# Patient Record
Sex: MALE | Race: WHITE | NOT HISPANIC OR LATINO | Employment: OTHER | URBAN - METROPOLITAN AREA
[De-identification: names, ages, dates, MRNs, and addresses within clinical notes are randomized per-mention and may not be internally consistent; named-entity substitution may affect disease eponyms.]

---

## 2017-01-23 ENCOUNTER — OFFICE VISIT (OUTPATIENT)
Dept: URGENT CARE | Facility: URGENT CARE | Age: 62
End: 2017-01-23
Payer: COMMERCIAL

## 2017-01-23 VITALS
HEIGHT: 68 IN | BODY MASS INDEX: 29.4 KG/M2 | WEIGHT: 194 LBS | TEMPERATURE: 99 F | SYSTOLIC BLOOD PRESSURE: 160 MMHG | OXYGEN SATURATION: 92 % | RESPIRATION RATE: 14 BRPM | DIASTOLIC BLOOD PRESSURE: 69 MMHG | HEART RATE: 77 BPM

## 2017-01-23 DIAGNOSIS — J20.9 ACUTE BRONCHITIS WITH SYMPTOMS > 10 DAYS: Primary | ICD-10-CM

## 2017-01-23 PROCEDURE — 99213 OFFICE O/P EST LOW 20 MIN: CPT | Performed by: NURSE PRACTITIONER

## 2017-01-23 RX ORDER — AZITHROMYCIN 250 MG/1
TABLET, FILM COATED ORAL
Qty: 6 TABLET | Refills: 0 | Status: SHIPPED | OUTPATIENT
Start: 2017-01-23 | End: 2018-09-17

## 2017-01-23 RX ORDER — PREDNISONE 20 MG/1
20 TABLET ORAL 2 TIMES DAILY
Qty: 10 TABLET | Refills: 0 | Status: SHIPPED | OUTPATIENT
Start: 2017-01-23 | End: 2017-01-28

## 2017-01-23 RX ORDER — CODEINE PHOSPHATE AND GUAIFENESIN 10; 100 MG/5ML; MG/5ML
1 SOLUTION ORAL EVERY 4 HOURS PRN
Qty: 236 ML | Refills: 0 | Status: SHIPPED | OUTPATIENT
Start: 2017-01-23 | End: 2018-09-17

## 2017-01-23 NOTE — MR AVS SNAPSHOT
After Visit Summary   1/23/2017    Quique Lyons    MRN: [id number]           Patient Information     Date Of Birth          1955        Visit Information        Provider Department      1/23/2017 6:50 PM Natty Escalante APRN [facility]        Today's Diagnoses     Acute bronchitis with symptoms > 10 days    -  1       Care Instructions      Bronchitis, Antibiotic Treatment (Adult)    Bronchitis is an infection of the air passages (bronchial tubes) in your lungs. It often occurs when you have a cold. This illness is contagious during the first few days and is spread through the air by coughing and sneezing, or by direct contact (touching the sick person and then touching your own eyes, nose, or mouth).  Symptoms of bronchitis include cough with mucus (phlegm) and low-grade fever. Bronchitis usually lasts 7 to 14 days. Mild cases can be treated with simple home remedies. More severe infection is treated with an antibiotic.  Home care  Follow these guidelines when caring for yourself at home:    If your symptoms are severe, rest at home for the first 2 to 3 days. When you go back to your usual activities, don't let yourself get too tired.    Do not smoke. Also avoid being exposed to secondhand smoke.    You may use over-the-counter medicines to control fever or pain, unless another medicine was prescribed. (Note: If you have chronic liver or kidney disease or have ever had a stomach ulcer or gastrointestinal bleeding, talk with your healthcare provider before using these medicines. Also talk to your provider if you are taking medicine to prevent blood clots.) Aspirin should never be given to anyone younger than 18 years of age who is ill with a viral infection or fever. It may cause severe liver or brain damage.    Your appetite may be poor, so a light diet is fine. Avoid dehydration by drinking 6 to 8 glasses of fluids per day (such as water, soft drinks, sports drinks, juices,  tea, or soup). Extra fluids will help loosen secretions in the nose and lungs.    Over-the-counter cough, cold, and sore-throat medicines will not shorten the length of the illness, but they may be helpful to reduce symptoms. (Note: Do not use decongestants if you have high blood pressure.)    Finish all antibiotic medicine. Do this even if you are feeling better after only a few days.  Follow-up care  Follow up with your healthcare provider, or as advised. If you had an X-ray or ECG (electrocardiogram), a specialist will review it. You will be notified of any new findings that may affect your care.  Note: If you are age 65 or older, or if you have a chronic lung disease or condition that affects your immune system, or you smoke, talk to your healthcare provider about having pneumococcal vaccinations and a yearly influenza vaccination (flu shot).  When to seek medical advice  Call your healthcare provider right away if any of these occur:    Fever of 100.4 F (38 C) or higher    Coughing up increased amounts of colored sputum    Weakness, drowsiness, headache, facial pain, ear pain, or a stiff neck   Call 911, or get immediate medical care  Contact emergency services right away if any of these occur.    Coughing up blood    Worsening weakness, drowsiness, headache, or stiff neck    Trouble breathing, wheezing, or pain with breathing    [id number] [employer] [address] [place] [address], [place] [id number]. All rights reserved. This information is not intended as a substitute for professional medical care. Always follow your healthcare professional's instructions.              Follow-ups after your visit        Who to contact     If you have questions or need follow up information about today's clinic visit or your schedule please contact [facility] directly at [phone].  Normal or non-critical lab and imaging results will be communicated to you by MyChart, letter or phone within 4 business  "days after the clinic has received the results. If you do not hear from us within 7 days, please contact the clinic through [facility] or phone. If you have a critical or abnormal lab result, we will notify you by phone as soon as possible.  Submit refill requests through [facility] or call your pharmacy and they will forward the refill request to us. Please allow 3 business days for your refill to be completed.          Additional Information About Your Visit        [facility] Information     [facility] lets you send messages to your doctor, view your test results, renew your prescriptions, schedule appointments and more. To sign up, go to [email or web address] . Click on \"Log in\" on the left side of the screen, which will take you to the Welcome page. Then click on \"Sign up Now\" on the right side of the page.     You will be asked to enter the access code listed below, as well as some personal information. Please follow the directions to create your username and password.     Your access code is: [email or web address]  Expires: 2017  7:48 PM     Your access code will  in 90 days. If you need help or a new code, please call your [facility] clinic or [phone].        Care EveryWhere ID     This is your Care EveryWhere ID. This could be used by other organizations to access your [facility] medical records  [id number]        Your Vitals Were     Pulse Temperature Respirations Height BMI (Body Mass Index) Pulse Oximetry    77 99  F (37.2  C) 14 5' 8\" (1.727 m) 29.50 kg/m2 92%       Blood Pressure from Last 3 Encounters:   17 160/69   06/11/15 137/68   04/06/15 138/75    Weight from Last 3 Encounters:   17 194 lb (87.998 kg)   06/11/15 191 lb (86.637 kg)   04/06/15 201 lb (91.173 kg)              Today, you had the following     No orders found for display         Today's Medication Changes          These changes are accurate as of: 17  7:48 PM.  If you have any questions, ask your nurse or doctor.          "      Start taking these medicines.        Dose/Directions    azithromycin 250 MG tablet   Commonly known as:  ZITHROMAX   Used for:  Acute bronchitis with symptoms > 10 days        Two tablets first day, then one tablet daily for four days.   Quantity:  6 tablet   Refills:  0       guaiFENesin-codeine 100-10 MG/5ML Soln solution   Commonly known as:  ROBITUSSIN AC   Used for:  Acute bronchitis with symptoms > 10 days        Dose:  1 tsp.   Take 5 mLs by mouth every 4 hours as needed for cough   Quantity:  236 mL   Refills:  0       predniSONE 20 MG tablet   Commonly known as:  DELTASONE   Used for:  Acute bronchitis with symptoms > 10 days        Dose:  20 mg   Take 1 tablet (20 mg) by mouth 2 times daily for 5 days   Quantity:  10 tablet   Refills:  0            Where to get your medicines      These medications were sent to [id number] - [facility] [address] [facility]  [address] [facility] [address]     Phone:  [phone]    - azithromycin 250 MG tablet  - predniSONE 20 MG tablet      Some of these will need a paper prescription and others can be bought over the counter.  Ask your nurse if you have questions.     Bring a paper prescription for each of these medications    - guaiFENesin-codeine 100-10 MG/5ML Soln solution             Primary Care Provider Office Phone # Fax #    [facility] [phone] [address] [facility]. [facility] [address]        Thank you!     Thank you for choosing [facility]  for your care. Our goal is always to provide you with excellent care. Hearing back from our patients is one way we can continue to improve our services. Please take a few minutes to complete the written survey that you may receive in the mail after your visit with us. Thank you!             Your Updated Medication List - Protect others around you: Learn how to safely use, store and throw away your  medicines at www.disposemymeds.org.          This list is accurate as of: 1/23/17  7:48 PM.  Always use your most recent med list.                   Brand Name Dispense Instructions for use    albuterol 108 (90 BASE) MCG/ACT Inhaler    PROAIR HFA/PROVENTIL HFA/VENTOLIN HFA    1 Inhaler    Inhale 2 puffs into the lungs every 6 hours as needed for shortness of breath / dyspnea       azithromycin 250 MG tablet    ZITHROMAX    6 tablet    Two tablets first day, then one tablet daily for four days.       guaiFENesin-codeine 100-10 MG/5ML Soln solution    ROBITUSSIN AC    236 mL    Take 5 mLs by mouth every 4 hours as needed for cough       predniSONE 20 MG tablet    DELTASONE    10 tablet    Take 1 tablet (20 mg) by mouth 2 times daily for 5 days

## 2017-01-24 NOTE — PATIENT INSTRUCTIONS
Bronchitis, Antibiotic Treatment (Adult)    Bronchitis is an infection of the air passages (bronchial tubes) in your lungs. It often occurs when you have a cold. This illness is contagious during the first few days and is spread through the air by coughing and sneezing, or by direct contact (touching the sick person and then touching your own eyes, nose, or mouth).  Symptoms of bronchitis include cough with mucus (phlegm) and low-grade fever. Bronchitis usually lasts 7 to 14 days. Mild cases can be treated with simple home remedies. More severe infection is treated with an antibiotic.  Home care  Follow these guidelines when caring for yourself at home:    If your symptoms are severe, rest at home for the first 2 to 3 days. When you go back to your usual activities, don't let yourself get too tired.    Do not smoke. Also avoid being exposed to secondhand smoke.    You may use over-the-counter medicines to control fever or pain, unless another medicine was prescribed. (Note: If you have chronic liver or kidney disease or have ever had a stomach ulcer or gastrointestinal bleeding, talk with your healthcare provider before using these medicines. Also talk to your provider if you are taking medicine to prevent blood clots.) Aspirin should never be given to anyone younger than 18 years of age who is ill with a viral infection or fever. It may cause severe liver or brain damage.    Your appetite may be poor, so a light diet is fine. Avoid dehydration by drinking 6 to 8 glasses of fluids per day (such as water, soft drinks, sports drinks, juices, tea, or soup). Extra fluids will help loosen secretions in the nose and lungs.    Over-the-counter cough, cold, and sore-throat medicines will not shorten the length of the illness, but they may be helpful to reduce symptoms. (Note: Do not use decongestants if you have high blood pressure.)    Finish all antibiotic medicine. Do this even if you are feeling better after only a  few days.  Follow-up care  Follow up with your healthcare provider, or as advised. If you had an X-ray or ECG (electrocardiogram), a specialist will review it. You will be notified of any new findings that may affect your care.  Note: If you are age 65 or older, or if you have a chronic lung disease or condition that affects your immune system, or you smoke, talk to your healthcare provider about having pneumococcal vaccinations and a yearly influenza vaccination (flu shot).  When to seek medical advice  Call your healthcare provider right away if any of these occur:    Fever of 100.4 F (38 C) or higher    Coughing up increased amounts of colored sputum    Weakness, drowsiness, headache, facial pain, ear pain, or a stiff neck   Call 911, or get immediate medical care  Contact emergency services right away if any of these occur.    Coughing up blood    Worsening weakness, drowsiness, headache, or stiff neck    Trouble breathing, wheezing, or pain with breathing    [id number] [employer]. [address] [place] [address], [place] [address]. All rights reserved. This information is not intended as a substitute for professional medical care. Always follow your healthcare professional's instructions.

## 2017-01-24 NOTE — PROGRESS NOTES
"  SUBJECTIVE:                                                    Quique Lyons is a 61 year old male who presents to clinic today for the following health issues:      Acute Illness   Acute illness concerns: cough  Onset: 2 weeks     Fever: no     Chills/Sweats: YES    Headache (location?): YES    Sinus Pressure:no    Conjunctivitis:  YES-     Ear Pain: no    Rhinorrhea: YES    Congestion: YES    Sore Throat: YES     Cough: YES    Wheeze: YES    Decreased Appetite: no     Nausea: no     Vomiting: no     Diarrhea:  no     Dysuria/Freq.: no     Fatigue/Achiness: YES    Sick/Strep Exposure: YES     Therapies Tried and outcome: albuterol, not helping      Problem list and histories reviewed & adjusted, as indicated.  Additional history: as documented    Problem list, Medication list, Allergies, and Medical/Social/Surgical histories reviewed in EPIC and updated as appropriate.    ROS:  Constitutional, HEENT, cardiovascular, pulmonary, gi and gu systems are negative, except as otherwise noted.    OBJECTIVE:                                                    /69 mmHg  Pulse 77  Temp(Src) 99  F (37.2  C)  Resp 14  Ht 5' 8\" (1.727 m)  Wt 194 lb (87.998 kg)  BMI 29.50 kg/m2  SpO2 92%  Body mass index is 29.5 kg/(m^2).  GENERAL: alert and no distress  EYES: Eyes grossly normal to inspection, PERRL and conjunctivae and sclerae normal  HENT: ear canals and TM's normal, nose and mouth without ulcers or lesions  NECK: no adenopathy, no asymmetry, masses, or scars and thyroid normal to palpation  RESP: expiratory wheezes throughout  CV: regular rate and rhythm, normal S1 S2, no S3 or S4, no murmur, click or rub, no peripheral edema and peripheral pulses strong      Diagnostic Test Results:  none      ASSESSMENT/PLAN:                                                      1. Acute bronchitis with symptoms > 10 days    - predniSONE (DELTASONE) 20 MG tablet; Take 1 tablet (20 mg) by mouth 2 times daily for 5 days  Dispense: " 10 tablet; Refill: 0  - guaiFENesin-codeine (ROBITUSSIN AC) 100-10 MG/5ML SOLN solution; Take 5 mLs by mouth every 4 hours as needed for cough  Dispense: 236 mL; Refill: 0  - azithromycin (ZITHROMAX) 250 MG tablet; Two tablets first day, then one tablet daily for four days.  Dispense: 6 tablet; Refill: 0    See Patient Instructions  Patient Instructions     Bronchitis, Antibiotic Treatment (Adult)    Bronchitis is an infection of the air passages (bronchial tubes) in your lungs. It often occurs when you have a cold. This illness is contagious during the first few days and is spread through the air by coughing and sneezing, or by direct contact (touching the sick person and then touching your own eyes, nose, or mouth).  Symptoms of bronchitis include cough with mucus (phlegm) and low-grade fever. Bronchitis usually lasts 7 to 14 days. Mild cases can be treated with simple home remedies. More severe infection is treated with an antibiotic.  Home care  Follow these guidelines when caring for yourself at home:    If your symptoms are severe, rest at home for the first 2 to 3 days. When you go back to your usual activities, don't let yourself get too tired.    Do not smoke. Also avoid being exposed to secondhand smoke.    You may use over-the-counter medicines to control fever or pain, unless another medicine was prescribed. (Note: If you have chronic liver or kidney disease or have ever had a stomach ulcer or gastrointestinal bleeding, talk with your healthcare provider before using these medicines. Also talk to your provider if you are taking medicine to prevent blood clots.) Aspirin should never be given to anyone younger than 18 years of age who is ill with a viral infection or fever. It may cause severe liver or brain damage.    Your appetite may be poor, so a light diet is fine. Avoid dehydration by drinking 6 to 8 glasses of fluids per day (such as water, soft drinks, sports drinks, juices, tea, or soup). Extra  fluids will help loosen secretions in the nose and lungs.    Over-the-counter cough, cold, and sore-throat medicines will not shorten the length of the illness, but they may be helpful to reduce symptoms. (Note: Do not use decongestants if you have high blood pressure.)    Finish all antibiotic medicine. Do this even if you are feeling better after only a few days.  Follow-up care  Follow up with your healthcare provider, or as advised. If you had an X-ray or ECG (electrocardiogram), a specialist will review it. You will be notified of any new findings that may affect your care.  Note: If you are age 65 or older, or if you have a chronic lung disease or condition that affects your immune system, or you smoke, talk to your healthcare provider about having pneumococcal vaccinations and a yearly influenza vaccination (flu shot).  When to seek medical advice  Call your healthcare provider right away if any of these occur:    Fever of 100.4 F (38 C) or higher    Coughing up increased amounts of colored sputum    Weakness, drowsiness, headache, facial pain, ear pain, or a stiff neck   Call 911, or get immediate medical care  Contact emergency services right away if any of these occur.    Coughing up blood    Worsening weakness, drowsiness, headache, or stiff neck    Trouble breathing, wheezing, or pain with breathing    [id number] [employer] [address] [place] [address], [place] [address]. All rights reserved. This information is not intended as a substitute for professional medical care. Always follow your healthcare professional's instructions.              CRISTO Adams [facility]

## 2017-01-24 NOTE — NURSING NOTE
"Chief Complaint   Patient presents with     Cough       Initial /69 mmHg  Pulse 77  Temp(Src) 99  F (37.2  C)  Resp 14  Ht 5' 8\" (1.727 m)  Wt 194 lb (87.998 kg)  BMI 29.50 kg/m2  SpO2 92% Estimated body mass index is 29.5 kg/(m^2) as calculated from the following:    Height as of this encounter: 5' 8\" (1.727 m).    Weight as of this encounter: 194 lb (87.998 kg).  BP completed using cuff size: regular left arm    Aury Jones. MA      "

## 2018-09-17 ENCOUNTER — OFFICE VISIT (OUTPATIENT)
Dept: URGENT CARE | Facility: URGENT CARE | Age: 63
End: 2018-09-17
Payer: COMMERCIAL

## 2018-09-17 VITALS
TEMPERATURE: 98.5 F | WEIGHT: 190 LBS | OXYGEN SATURATION: 95 % | HEART RATE: 64 BPM | DIASTOLIC BLOOD PRESSURE: 77 MMHG | BODY MASS INDEX: 28.89 KG/M2 | SYSTOLIC BLOOD PRESSURE: 163 MMHG | RESPIRATION RATE: 16 BRPM

## 2018-09-17 DIAGNOSIS — J34.89 NASAL SORE: ICD-10-CM

## 2018-09-17 DIAGNOSIS — J44.9 CHRONIC OBSTRUCTIVE PULMONARY DISEASE, UNSPECIFIED COPD TYPE (H): Primary | ICD-10-CM

## 2018-09-17 DIAGNOSIS — J20.9 ACUTE BRONCHITIS WITH SYMPTOMS > 10 DAYS: ICD-10-CM

## 2018-09-17 PROCEDURE — 99214 OFFICE O/P EST MOD 30 MIN: CPT | Performed by: NURSE PRACTITIONER

## 2018-09-17 RX ORDER — CODEINE PHOSPHATE AND GUAIFENESIN 10; 100 MG/5ML; MG/5ML
1 SOLUTION ORAL EVERY 4 HOURS PRN
Qty: 236 ML | Refills: 0 | Status: SHIPPED | OUTPATIENT
Start: 2018-09-17 | End: 2020-05-06

## 2018-09-17 RX ORDER — AZITHROMYCIN 250 MG/1
TABLET, FILM COATED ORAL
Qty: 6 TABLET | Refills: 0 | Status: SHIPPED | OUTPATIENT
Start: 2018-09-17 | End: 2020-05-06

## 2018-09-17 RX ORDER — ALBUTEROL SULFATE 90 UG/1
2 AEROSOL, METERED RESPIRATORY (INHALATION) EVERY 6 HOURS PRN
Qty: 1 INHALER | Refills: 0 | Status: SHIPPED | OUTPATIENT
Start: 2018-09-17 | End: 2020-05-06

## 2018-09-17 RX ORDER — PREDNISONE 20 MG/1
20 TABLET ORAL 2 TIMES DAILY
Qty: 10 TABLET | Refills: 0 | Status: SHIPPED | OUTPATIENT
Start: 2018-09-17 | End: 2018-09-22

## 2018-09-17 ASSESSMENT — PAIN SCALES - GENERAL: PAINLEVEL: NO PAIN (0)

## 2018-09-17 NOTE — NURSING NOTE
"Chief Complaint   Patient presents with     Cough     Cough, slight runny nose, some fatigue sx since last saturday. Thinks bronchitis.       Initial /77  Pulse 64  Temp 98.5  F (36.9  C) (Oral)  Resp 16  Wt 190 lb (86.2 kg)  SpO2 95%  BMI 28.89 kg/m2 Estimated body mass index is 28.89 kg/(m^2) as calculated from the following:    Height as of 1/23/17: 5' 8\" (1.727 m).    Weight as of this encounter: 190 lb (86.2 kg)..  BP completed using cuff size: rex Olson CMA    "

## 2018-09-17 NOTE — MR AVS SNAPSHOT
After Visit Summary   9/17/2018    Quique Lyons    MRN: [id number]           Patient Information     Date Of Birth          1955        Visit Information        Provider Department      9/17/2018 5:30 PM Natty Escalante APRN CNP [facility]        Today's Diagnoses     Chronic obstructive pulmonary disease, unspecified COPD type (H)    -  1    Acute bronchitis with symptoms > 10 days        Nasal sore           Follow-ups after your visit        Additional Services     DERMATOLOGY REFERRAL       Your provider has referred you to: [facility] - Jeremiah Aguilar [phone]   [email or web address]    Please be aware that coverage of these services is subject to the terms and limitations of your health insurance plan.  Call member services at your health plan with any benefit or coverage questions.      Please bring the following with you to your appointment:    (1) Any X-Rays, CTs or MRIs which have been performed.  Contact the facility where they were done to arrange for  prior to your scheduled appointment.    (2) List of current medications  (3) This referral request   (4) Any documents/labs given to you for this referral                  Who to contact     If you have questions or need follow up information about today's clinic visit or your schedule please contact [facility] directly at [phone].  Normal or non-critical lab and imaging results will be communicated to you by MyChart, letter or phone within 4 business days after the clinic has received the results. If you do not hear from us within 7 days, please contact the clinic through MyChart or phone. If you have a critical or abnormal lab result, we will notify you by phone as soon as possible.  Submit refill requests through [employer] or call your pharmacy and they will forward the refill request to us. Please allow 3 business days for your refill to be completed.     "      Additional Information About Your Visit        MyChart Information     [employer] lets you send messages to your doctor, view your test results, renew your prescriptions, schedule appointments and more. To sign up, go to [email or web address] . Click on \"Log in\" on the left side of the screen, which will take you to the Welcome page. Then click on \"Sign up Now\" on the right side of the page.     You will be asked to enter the access code listed below, as well as some personal information. Please follow the directions to create your username and password.     Your access code is: [email or web address]  Expires: 2018  6:32 PM     Your access code will  in 90 days. If you need help or a new code, please call your [place] clinic or [phone].        Care EveryWhere ID     This is your Care EveryWhere ID. This could be used by other organizations to access your [place] medical records  [id number]        Your Vitals Were     Pulse Temperature Respirations Pulse Oximetry BMI (Body Mass Index)       64 98.5  F (36.9  C) (Oral) 16 95% 28.89 kg/m2        Blood Pressure from Last 3 Encounters:   18 163/77   17 160/69   06/11/15 137/68    Weight from Last 3 Encounters:   18 190 lb (86.2 kg)   17 194 lb (88 kg)   06/11/15 191 lb (86.6 kg)              We Performed the Following     DERMATOLOGY REFERRAL          Today's Medication Changes          These changes are accurate as of 18  6:32 PM.  If you have any questions, ask your nurse or doctor.               Start taking these medicines.        Dose/Directions    azithromycin 250 MG tablet   Commonly known as:  ZITHROMAX   Used for:  Chronic obstructive pulmonary disease, unspecified COPD type (H)   Started by:  Natty Escalante APRN CNP        Two tablets first day, then one tablet daily for four days.   Quantity:  6 tablet   Refills:  0       mupirocin 2 % nasal ointment   Commonly known as:  BACTROBAN NASAL   Used for:  Nasal " sore   Started by:  Natty Escalante APRN CNP        Dose:  1 g   Apply 1 g into each nare 3 times daily for 5 days   Quantity:  22 g   Refills:  0       predniSONE 20 MG tablet   Commonly known as:  DELTASONE   Used for:  Chronic obstructive pulmonary disease, unspecified COPD type (H)   Started by:  Natty Escalante APRN CNP        Dose:  20 mg   Take 1 tablet (20 mg) by mouth 2 times daily for 5 days   Quantity:  10 tablet   Refills:  0         These medicines have changed or have updated prescriptions.        Dose/Directions    * albuterol 108 (90 Base) MCG/ACT inhaler   Commonly known as:  PROAIR HFA/PROVENTIL HFA/VENTOLIN HFA   This may have changed:  Another medication with the same name was added. Make sure you understand how and when to take each.   Used for:  SOB (shortness of breath)   Changed by:  Natty Escalante APRN CNP        Dose:  2 puff   Inhale 2 puffs into the lungs every 6 hours as needed for shortness of breath / dyspnea   Quantity:  1 Inhaler   Refills:  0       * albuterol 108 (90 Base) MCG/ACT inhaler   Commonly known as:  PROAIR HFA/PROVENTIL HFA/VENTOLIN HFA   This may have changed:  You were already taking a medication with the same name, and this prescription was added. Make sure you understand how and when to take each.   Used for:  Chronic obstructive pulmonary disease, unspecified COPD type (H)   Changed by:  Natty Escalante APRN CNP        Dose:  2 puff   Inhale 2 puffs into the lungs every 6 hours as needed for shortness of breath / dyspnea or wheezing   Quantity:  1 Inhaler   Refills:  0       * Notice:  This list has 2 medication(s) that are the same as other medications prescribed for you. Read the directions carefully, and ask your doctor or other care provider to review them with you.         Where to get your medicines      These medications were sent to [facility] [address] [address] [facility] [address], [address]  [address] [address] [facility] [address] [facility], [facility] [address], [place]  [place] [address]     Phone:  [phone]     albuterol 108 (90 Base) MCG/ACT inhaler    azithromycin 250 MG tablet    mupirocin 2 % nasal ointment    predniSONE 20 MG tablet         Some of these will need a paper prescription and others can be bought over the counter.  Ask your nurse if you have questions.     Bring a paper prescription for each of these medications     guaiFENesin-codeine 100-10 MG/5ML Soln solution                Primary Care Provider Office Phone # Fax #    [facility] [phone] [address] [facility] [address]        Equal Access to Services     TIRSO PRINGLE : Hadii sharifa gracia hadasho Soramiro, waaxda luqadaha, qaybta kaalmada adetonio, rodney ramirez . So [facility] [phone].    ATENCIÓN: Si habla español, tiene a veliz disposición servicios gratuitos de asistencia lingüística. [facility] [phone].    We comply with applicable federal civil rights laws and [place] laws. We do not discriminate on the basis of race, color, national origin, age, disability, sex, sexual orientation, or gender identity.            Thank you!     Thank you for choosing [facility]  for your care. Our goal is always to provide you with excellent care. Hearing back from our patients is one way we can continue to improve our services. Please take a few minutes to complete the written survey that you may receive in the mail after your visit with us. Thank you!             Your Updated Medication List - Protect others around you: Learn how to safely use, store and throw away your medicines at www.disposemymeds.org.          This list is accurate as of 9/17/18  6:32 PM.  Always use your most recent med list.                   Brand Name Dispense Instructions for use Diagnosis    * albuterol 108 (90 Base) MCG/ACT inhaler    PROAIR HFA/PROVENTIL HFA/VENTOLIN HFA    1 Inhaler    Inhale 2 puffs into the lungs every 6 hours as needed for shortness of breath /  dyspnea    SOB (shortness of breath)       * albuterol 108 (90 Base) MCG/ACT inhaler    PROAIR HFA/PROVENTIL HFA/VENTOLIN HFA    1 Inhaler    Inhale 2 puffs into the lungs every 6 hours as needed for shortness of breath / dyspnea or wheezing    Chronic obstructive pulmonary disease, unspecified COPD type (H)       azithromycin 250 MG tablet    ZITHROMAX    6 tablet    Two tablets first day, then one tablet daily for four days.    Chronic obstructive pulmonary disease, unspecified COPD type (H)       guaiFENesin-codeine 100-10 MG/5ML Soln solution    ROBITUSSIN AC    236 mL    Take 5 mLs by mouth every 4 hours as needed for cough    Acute bronchitis with symptoms > 10 days       mupirocin 2 % nasal ointment    BACTROBAN NASAL    22 g    Apply 1 g into each nare 3 times daily for 5 days    Nasal sore       predniSONE 20 MG tablet    DELTASONE    10 tablet    Take 1 tablet (20 mg) by mouth 2 times daily for 5 days    Chronic obstructive pulmonary disease, unspecified COPD type (H)       * Notice:  This list has 2 medication(s) that are the same as other medications prescribed for you. Read the directions carefully, and ask your doctor or other care provider to review them with you.

## 2018-09-17 NOTE — PROGRESS NOTES
SUBJECTIVE:   Quique Lyons is a 63 year old male presenting with a chief complaint of cough.   Onset of symptoms was 2 week(s) ago.  Course of illness is worsening.    Severity moderate  Current and Associated symptoms:sob wheezing fatigue  Also has spot under left nostril. Has been there for a while now raw from rubbing, open.   Treatment measures tried include Tylenol/Ibuprofen, OTC Cough med, Fluids and Rest.  Predisposing factors include HX of COPD.    History reviewed. No pertinent past medical history.  Current Outpatient Prescriptions   Medication Sig Dispense Refill     albuterol (PROAIR HFA, PROVENTIL HFA, VENTOLIN HFA) 108 (90 BASE) MCG/ACT inhaler Inhale 2 puffs into the lungs every 6 hours as needed for shortness of breath / dyspnea 1 Inhaler 0     guaiFENesin-codeine (ROBITUSSIN AC) 100-10 MG/5ML SOLN solution Take 5 mLs by mouth every 4 hours as needed for cough (Patient not taking: Reported on 9/17/2018) 236 mL 0     Social History   Substance Use Topics     Smoking status: Current Every Day Smoker     Smokeless tobacco: Never Used     Alcohol use Yes       ROS:  Review of systems negative except as stated above.    OBJECTIVE:  /77  Pulse 64  Temp 98.5  F (36.9  C) (Oral)  Resp 16  Wt 190 lb (86.2 kg)  SpO2 95%  BMI 28.89 kg/m2  GENERAL APPEARANCE: Alert and no distress  EYES: EOMI,  PERRL, conjunctiva clear  HENT: ear canals and TM's normal.  Nose and mouth without ulcers, erythema or lesions  NECK: supple, nontender, no lymphadenopathy  RESP: expiratory wheezes throughout  CV: regular rates and rhythm, normal S1 S2, no murmur noted  SKIN: sore under left nostril    ASSESSMENT:  (J44.9) Chronic obstructive pulmonary disease, unspecified COPD type (H)  (primary encounter diagnosis)  Plan: azithromycin (ZITHROMAX) 250 MG tablet,         predniSONE (DELTASONE) 20 MG tablet, albuterol         (PROAIR HFA/PROVENTIL HFA/VENTOLIN HFA) 108 (90        Base) MCG/ACT inhaler         guaiFENesin-codeine (ROBITUSSIN AC) 100-10         MG/5ML SOLN solution        (J34.89) Nasal sore  Plan: mupirocin (BACTROBAN NASAL) 2 % nasal ointment,        DERMATOLOGY REFERRAL    Fluids, Rest and Vaporizer  Home treat and monitor symptoms call or rtc if worsening or not improving    Natty Escalante, CRISTO CNP

## 2020-03-12 ENCOUNTER — OFFICE VISIT (OUTPATIENT)
Dept: FAMILY MEDICINE | Facility: CLINIC | Age: 65
End: 2020-03-12
Payer: COMMERCIAL

## 2020-03-12 VITALS
HEART RATE: 90 BPM | TEMPERATURE: 99.1 F | WEIGHT: 195 LBS | SYSTOLIC BLOOD PRESSURE: 139 MMHG | BODY MASS INDEX: 29.65 KG/M2 | DIASTOLIC BLOOD PRESSURE: 75 MMHG | OXYGEN SATURATION: 96 %

## 2020-03-12 DIAGNOSIS — J44.1 CHRONIC OBSTRUCTIVE PULMONARY DISEASE WITH ACUTE EXACERBATION (H): Primary | ICD-10-CM

## 2020-03-12 LAB
FLUAV+FLUBV AG SPEC QL: NEGATIVE
FLUAV+FLUBV AG SPEC QL: NEGATIVE
SPECIMEN SOURCE: NORMAL

## 2020-03-12 PROCEDURE — 87804 INFLUENZA ASSAY W/OPTIC: CPT | Mod: 59 | Performed by: FAMILY MEDICINE

## 2020-03-12 PROCEDURE — 99214 OFFICE O/P EST MOD 30 MIN: CPT | Performed by: FAMILY MEDICINE

## 2020-03-12 RX ORDER — AZITHROMYCIN 250 MG/1
TABLET, FILM COATED ORAL
Qty: 6 TABLET | Refills: 0 | Status: SHIPPED | OUTPATIENT
Start: 2020-03-12 | End: 2020-05-06

## 2020-03-12 RX ORDER — PREDNISONE 20 MG/1
TABLET ORAL
Qty: 18 TABLET | Refills: 0 | Status: SHIPPED | OUTPATIENT
Start: 2020-03-12 | End: 2020-05-06

## 2020-03-12 RX ORDER — ALBUTEROL SULFATE 90 UG/1
2 AEROSOL, METERED RESPIRATORY (INHALATION) EVERY 4 HOURS PRN
Qty: 1 INHALER | Refills: 0 | Status: SHIPPED | OUTPATIENT
Start: 2020-03-12 | End: 2020-05-06

## 2020-03-12 NOTE — PROGRESS NOTES
Chief complaint: cough x 3 days worsening    Fever No  Sinus congestion/sinus pain Yes  Wheezing: Yes  Chest pain or exertional shortness of breath: NO   Exposure to pertussis or pertussis like symptoms: No  Orthopnea, worsening edema, pnd: NO  Rash: NO  Tried OTC medications without relief  No hemoptysis.  Worsening symptoms hence patient came in to be seen     Problem list and histories reviewed & adjusted, as indicated.  Additional history: as documented    Problem list, Medication list, Allergies, and Medical/Social/Surgical histories reviewed in [facility] and updated as appropriate.    ROS:  Constitutional, HEENT, cardiovascular, pulmonary, gi and gu systems are negative, except as otherwise noted.    OBJECTIVE:                                                    /75   Pulse 90   Temp 99.1  F (37.3  C) (Oral)   Wt 88.5 kg (195 lb)   SpO2 96%   BMI 29.65 kg/m    Body mass index is 29.65 kg/m .  GENERAL: healthy, alert and no distress  EYES: pink palpebral conjunctiva, anicteric sclera  ENT: midline nasal septum normal ear exam. congested sinuses.   Mouth: moist buccal mucosa nonhyperemic posterior pharyngeal wall. No tonsillar enlargement or cellulitis  NECK: no adenopathy, no asymmetry, masses, or scars and thyroid normal to palpation  RESP: symmetrical chest expansion no retractions prolonged expriatory phase with occasional wheeze   CV: regular rate and rhythm, normal S1 S2, no S3 or S4,  No murmurs, click or rub  SKIN: no visible rashes noted  Pscyh: Appropriate mood and affect  MS: no gross musculoskeletal defects noted    Diagnostic Test Results:  Results for orders placed or performed in visit on 03/12/20 (from the past 24 hour(s))   Influenza A/B antigen   Result Value Ref Range    Influenza A/B Agn Specimen Nasal     Influenza A Negative NEG^Negative    Influenza B Negative NEG^Negative        ASSESSMENT/PLAN:                                                      No diagnosis found.      ICD-10-CM     1. Chronic obstructive pulmonary disease with acute exacerbation (HCC)  J44.1 albuterol (PROAIR HFA/PROVENTIL HFA/VENTOLIN HFA) 108 (90 Base) MCG/ACT inhaler     predniSONE (DELTASONE) 20 MG tablet     Influenza A/B antigen     azithromycin (ZITHROMAX) 250 MG tablet       Advised that prolonged cough > 3 weeks recommend chest xray, offered today, declined.   Adverse reactions of medications discussed.  Over the counter medications discussed.   Aware to come back in if with worsening symptoms or if no relief despite treatment plan  Patient voiced understanding and had no further questions.     MD Mouna Rosa MD  [facility]

## 2020-05-06 ENCOUNTER — VIRTUAL VISIT (OUTPATIENT)
Dept: FAMILY MEDICINE | Facility: CLINIC | Age: 65
End: 2020-05-06
Payer: COMMERCIAL

## 2020-05-06 DIAGNOSIS — J02.9 SORE THROAT: Primary | ICD-10-CM

## 2020-05-06 PROCEDURE — 99213 OFFICE O/P EST LOW 20 MIN: CPT | Mod: GT | Performed by: PHYSICIAN ASSISTANT

## 2020-05-06 NOTE — PROGRESS NOTES
"Quique Lyons is a 64 year old male who is being evaluated via a billable video visit.      The patient has been notified of following:     \"This video visit will be conducted via a call between you and your physician/provider. We have found that certain health care needs can be provided without the need for an in-person physical exam.  This service lets us provide the care you need with a video conversation.  If a prescription is necessary we can send it directly to your pharmacy.  If lab work is needed we can place an order for that and you can then stop by our lab to have the test done at a later time.    Video visits are billed at different rates depending on your insurance coverage.  Please reach out to your insurance provider with any questions.    If during the course of the call the physician/provider feels a video visit is not appropriate, you will not be charged for this service.\"    Patient has given verbal consent for Video visit? Yes    How would you like to obtain your AVS?     Patient would like the video invitation sent by: Text to cell phone: [phone]    Will anyone else be joining your video visit? No        Subjective     Quique Lyons is a 64 year old male who presents to clinic today for the following health issues:    HPI  RESPIRATORY SYMPTOMS      Duration: 1 week     Description  sore throat and swollen glands     Severity: mild    Accompanying signs and symptoms: None    History (predisposing factors):  COPD and tobacco abuse    Precipitating or alleviating factors: None    Therapies tried and outcome:  none  1-2/10    He denies any fevers cough runny nose.  He does admit to being a smoker.  He still feels fine.  Does not really have any concern about the results make sure he does not have strep.    Video Start Time: 2:14 PM    Patient Active Problem List   Diagnosis     Advanced directives, counseling/discussion     Tobacco abuse     Chronic obstructive pulmonary disease with acute " "exacerbation (HCC)     History reviewed. No pertinent surgical history.    Social History     Tobacco Use     Smoking status: Current Every Day Smoker     Smokeless tobacco: Never Used   Substance Use Topics     Alcohol use: Yes     Family History   Problem Relation Age of Onset     Dementia Father          Current Outpatient Medications   Medication Sig Dispense Refill     albuterol (PROAIR HFA, PROVENTIL HFA, VENTOLIN HFA) 108 (90 BASE) MCG/ACT inhaler Inhale 2 puffs into the lungs every 6 hours as needed for shortness of breath / dyspnea (Patient not taking: Reported on 5/6/2020) 1 Inhaler 0     No Known Allergies    Reviewed and updated as needed this visit by Provider  Tobacco  Allergies  Meds  Problems  Med Hx  Surg Hx  Fam Hx         Review of Systems   ROS COMP: Constitutional, HEENT, cardiovascular, pulmonary, gi and gu systems are negative, except as otherwise noted.      Objective    There were no vitals taken for this visit.  Estimated body mass index is 29.65 kg/m  as calculated from the following:    Height as of 1/23/17: 1.727 m (5' 8\").    Weight as of 3/12/20: 88.5 kg (195 lb).  Physical Exam     GENERAL: healthy, alert and no distress  EYES: Eyes grossly normal to inspection, conjunctivae and sclerae normal  RESP: no audible wheeze, cough, or visible cyanosis.  No visible retractions or increased work of breathing.  Able to speak fully in complete sentences.  NEURO: Cranial nerves grossly intact, mentation intact and speech normal  PSYCH: mentation appears normal, affect normal/bright, judgement and insight intact, normal speech and appearance well-groomed      Diagnostic Test Results:  Labs reviewed in Epic        Assessment & Plan       ICD-10-CM    1. Sore throat  J02.9 Streptococcus A Rapid Scr w Reflx to PCR     Talk to patient on the phone regarding his concerns.  I will suspicion of strep I suspect this is more viral load.  I advised him on care to see if this continues to resolve if " is not we will get him set up for strep test.        Return in about 1 week (around 5/13/2020) for recheck, As Needed.    Steven Adam PA-C  [facility]      Video-Visit Details    Type of service:  Video Visit    Video End Time:2:19 PM    Originating Location (pt. Location): Home    Distant Location (provider location):  [facility]     Platform used for Video Visit: Doximity    Return in about 1 week (around 5/13/2020) for recheck, As Needed.       Steven Adam PA-C

## 2020-09-11 ENCOUNTER — NURSE TRIAGE (OUTPATIENT)
Dept: NURSING | Facility: CLINIC | Age: 65
End: 2020-09-11

## 2020-09-11 ENCOUNTER — APPOINTMENT (OUTPATIENT)
Dept: GENERAL RADIOLOGY | Facility: CLINIC | Age: 65
End: 2020-09-11
Attending: FAMILY MEDICINE
Payer: COMMERCIAL

## 2020-09-11 ENCOUNTER — HOSPITAL ENCOUNTER (OUTPATIENT)
Facility: CLINIC | Age: 65
Setting detail: OBSERVATION
Discharge: HOME | End: 2020-09-12
Attending: INTERNAL MEDICINE | Admitting: INTERNAL MEDICINE
Payer: COMMERCIAL

## 2020-09-11 ENCOUNTER — HOSPITAL ENCOUNTER (EMERGENCY)
Facility: CLINIC | Age: 65
Discharge: ANOTHER HOSPITAL | End: 2020-09-11
Attending: FAMILY MEDICINE | Admitting: FAMILY MEDICINE
Payer: COMMERCIAL

## 2020-09-11 VITALS
WEIGHT: 190 LBS | SYSTOLIC BLOOD PRESSURE: 112 MMHG | RESPIRATION RATE: 11 BRPM | TEMPERATURE: 98.3 F | HEIGHT: 68 IN | OXYGEN SATURATION: 95 % | DIASTOLIC BLOOD PRESSURE: 84 MMHG | BODY MASS INDEX: 28.79 KG/M2 | HEART RATE: 98 BPM

## 2020-09-11 DIAGNOSIS — I48.91 ATRIAL FIBRILLATION WITH RVR (H): ICD-10-CM

## 2020-09-11 DIAGNOSIS — I48.92 ATRIAL FLUTTER WITH RAPID VENTRICULAR RESPONSE (H): Primary | ICD-10-CM

## 2020-09-11 LAB
ALBUMIN SERPL-MCNC: 4 G/DL (ref 3.4–5)
ALP SERPL-CCNC: 80 U/L (ref 40–150)
ALT SERPL W P-5'-P-CCNC: 23 U/L (ref 0–70)
ANION GAP SERPL CALCULATED.3IONS-SCNC: 5 MMOL/L (ref 3–14)
APTT PPP: 32 SEC (ref 22–37)
AST SERPL W P-5'-P-CCNC: 17 U/L (ref 0–45)
BASOPHILS # BLD AUTO: 0.1 10E9/L (ref 0–0.2)
BASOPHILS NFR BLD AUTO: 0.8 %
BILIRUB SERPL-MCNC: 0.6 MG/DL (ref 0.2–1.3)
BUN SERPL-MCNC: 15 MG/DL (ref 7–30)
CALCIUM SERPL-MCNC: 8.8 MG/DL (ref 8.5–10.1)
CHLORIDE SERPL-SCNC: 107 MMOL/L (ref 94–109)
CO2 SERPL-SCNC: 27 MMOL/L (ref 20–32)
CREAT SERPL-MCNC: 0.89 MG/DL (ref 0.66–1.25)
DIFFERENTIAL METHOD BLD: ABNORMAL
EOSINOPHIL # BLD AUTO: 0.1 10E9/L (ref 0–0.7)
EOSINOPHIL NFR BLD AUTO: 1.1 %
ERYTHROCYTE [DISTWIDTH] IN BLOOD BY AUTOMATED COUNT: 12.2 % (ref 10–15)
GFR SERPL CREATININE-BSD FRML MDRD: >90 ML/MIN/{1.73_M2}
GLUCOSE BLDC GLUCOMTR-MCNC: 146 MG/DL (ref 70–99)
GLUCOSE SERPL-MCNC: 134 MG/DL (ref 70–99)
HCT VFR BLD AUTO: 51.6 % (ref 40–53)
HGB BLD-MCNC: 17.3 G/DL (ref 13.3–17.7)
IMM GRANULOCYTES # BLD: 0.1 10E9/L (ref 0–0.4)
IMM GRANULOCYTES NFR BLD: 0.4 %
INR PPP: 1.09 (ref 0.86–1.14)
LABORATORY COMMENT REPORT: NORMAL
LMWH PPP CHRO-ACNC: 0.15 IU/ML
LYMPHOCYTES # BLD AUTO: 3.2 10E9/L (ref 0.8–5.3)
LYMPHOCYTES NFR BLD AUTO: 27 %
MCH RBC QN AUTO: 31.5 PG (ref 26.5–33)
MCHC RBC AUTO-ENTMCNC: 33.5 G/DL (ref 31.5–36.5)
MCV RBC AUTO: 94 FL (ref 78–100)
MONOCYTES # BLD AUTO: 0.9 10E9/L (ref 0–1.3)
MONOCYTES NFR BLD AUTO: 7.4 %
NEUTROPHILS # BLD AUTO: 7.6 10E9/L (ref 1.6–8.3)
NEUTROPHILS NFR BLD AUTO: 63.3 %
NRBC # BLD AUTO: 0 10*3/UL
NRBC BLD AUTO-RTO: 0 /100
PLATELET # BLD AUTO: 247 10E9/L (ref 150–450)
POTASSIUM SERPL-SCNC: 4.1 MMOL/L (ref 3.4–5.3)
PROT SERPL-MCNC: 7.3 G/DL (ref 6.8–8.8)
RBC # BLD AUTO: 5.49 10E12/L (ref 4.4–5.9)
SARS-COV-2 RNA SPEC QL NAA+PROBE: NEGATIVE
SARS-COV-2 RNA SPEC QL NAA+PROBE: NORMAL
SODIUM SERPL-SCNC: 139 MMOL/L (ref 133–144)
SPECIMEN SOURCE: NORMAL
SPECIMEN SOURCE: NORMAL
TROPONIN I SERPL-MCNC: <0.015 UG/L (ref 0–0.04)
TSH SERPL DL<=0.005 MIU/L-ACNC: 1.32 MU/L (ref 0.4–4)
WBC # BLD AUTO: 12 10E9/L (ref 4–11)

## 2020-09-11 PROCEDURE — 25800030 ZZH RX IP 258 OP 636: Performed by: FAMILY MEDICINE

## 2020-09-11 PROCEDURE — 96366 THER/PROPH/DIAG IV INF ADDON: CPT

## 2020-09-11 PROCEDURE — 85610 PROTHROMBIN TIME: CPT | Performed by: FAMILY MEDICINE

## 2020-09-11 PROCEDURE — 36415 COLL VENOUS BLD VENIPUNCTURE: CPT | Performed by: INTERNAL MEDICINE

## 2020-09-11 PROCEDURE — 85520 HEPARIN ASSAY: CPT | Performed by: INTERNAL MEDICINE

## 2020-09-11 PROCEDURE — G0378 HOSPITAL OBSERVATION PER HR: HCPCS

## 2020-09-11 PROCEDURE — 85730 THROMBOPLASTIN TIME PARTIAL: CPT | Performed by: FAMILY MEDICINE

## 2020-09-11 PROCEDURE — 00000146 ZZHCL STATISTIC GLUCOSE BY METER IP

## 2020-09-11 PROCEDURE — 71046 X-RAY EXAM CHEST 2 VIEWS: CPT

## 2020-09-11 PROCEDURE — 99285 EMERGENCY DEPT VISIT HI MDM: CPT | Mod: 25 | Performed by: FAMILY MEDICINE

## 2020-09-11 PROCEDURE — 96376 TX/PRO/DX INJ SAME DRUG ADON: CPT | Performed by: FAMILY MEDICINE

## 2020-09-11 PROCEDURE — 96366 THER/PROPH/DIAG IV INF ADDON: CPT | Performed by: FAMILY MEDICINE

## 2020-09-11 PROCEDURE — U0003 INFECTIOUS AGENT DETECTION BY NUCLEIC ACID (DNA OR RNA); SEVERE ACUTE RESPIRATORY SYNDROME CORONAVIRUS 2 (SARS-COV-2) (CORONAVIRUS DISEASE [COVID-19]), AMPLIFIED PROBE TECHNIQUE, MAKING USE OF HIGH THROUGHPUT TECHNOLOGIES AS DESCRIBED BY CMS-2020-01-R: HCPCS | Performed by: FAMILY MEDICINE

## 2020-09-11 PROCEDURE — 99219 ZZC INITIAL OBSERVATION CARE,LEVL II: CPT | Performed by: INTERNAL MEDICINE

## 2020-09-11 PROCEDURE — 96361 HYDRATE IV INFUSION ADD-ON: CPT | Performed by: FAMILY MEDICINE

## 2020-09-11 PROCEDURE — 25000125 ZZHC RX 250: Performed by: FAMILY MEDICINE

## 2020-09-11 PROCEDURE — 93005 ELECTROCARDIOGRAM TRACING: CPT | Performed by: FAMILY MEDICINE

## 2020-09-11 PROCEDURE — 96375 TX/PRO/DX INJ NEW DRUG ADDON: CPT | Performed by: FAMILY MEDICINE

## 2020-09-11 PROCEDURE — 93010 ELECTROCARDIOGRAM REPORT: CPT | Mod: Z6 | Performed by: FAMILY MEDICINE

## 2020-09-11 PROCEDURE — 96365 THER/PROPH/DIAG IV INF INIT: CPT

## 2020-09-11 PROCEDURE — 96365 THER/PROPH/DIAG IV INF INIT: CPT | Performed by: FAMILY MEDICINE

## 2020-09-11 PROCEDURE — 80053 COMPREHEN METABOLIC PANEL: CPT | Performed by: FAMILY MEDICINE

## 2020-09-11 PROCEDURE — 99291 CRITICAL CARE FIRST HOUR: CPT | Mod: 25 | Performed by: FAMILY MEDICINE

## 2020-09-11 PROCEDURE — C9803 HOPD COVID-19 SPEC COLLECT: HCPCS | Performed by: FAMILY MEDICINE

## 2020-09-11 PROCEDURE — 96368 THER/DIAG CONCURRENT INF: CPT

## 2020-09-11 PROCEDURE — 25000128 H RX IP 250 OP 636: Performed by: FAMILY MEDICINE

## 2020-09-11 PROCEDURE — 84443 ASSAY THYROID STIM HORMONE: CPT | Performed by: FAMILY MEDICINE

## 2020-09-11 PROCEDURE — 85025 COMPLETE CBC W/AUTO DIFF WBC: CPT | Performed by: FAMILY MEDICINE

## 2020-09-11 PROCEDURE — 84484 ASSAY OF TROPONIN QUANT: CPT | Performed by: FAMILY MEDICINE

## 2020-09-11 RX ORDER — HEPARIN SODIUM 10000 [USP'U]/100ML
12 INJECTION, SOLUTION INTRAVENOUS CONTINUOUS
Status: DISCONTINUED | OUTPATIENT
Start: 2020-09-11 | End: 2020-09-11 | Stop reason: HOSPADM

## 2020-09-11 RX ORDER — DILTIAZEM HYDROCHLORIDE 5 MG/ML
10 INJECTION INTRAVENOUS ONCE
Status: COMPLETED | OUTPATIENT
Start: 2020-09-11 | End: 2020-09-11

## 2020-09-11 RX ORDER — HEPARIN SODIUM 10000 [USP'U]/100ML
12 INJECTION, SOLUTION INTRAVENOUS CONTINUOUS
Status: DISCONTINUED | OUTPATIENT
Start: 2020-09-11 | End: 2020-09-11

## 2020-09-11 RX ORDER — NALOXONE HYDROCHLORIDE 0.4 MG/ML
.1-.4 INJECTION, SOLUTION INTRAMUSCULAR; INTRAVENOUS; SUBCUTANEOUS
Status: DISCONTINUED | OUTPATIENT
Start: 2020-09-11 | End: 2020-09-12 | Stop reason: HOSPADM

## 2020-09-11 RX ORDER — ACETAMINOPHEN 650 MG/1
650 SUPPOSITORY RECTAL EVERY 4 HOURS PRN
Status: DISCONTINUED | OUTPATIENT
Start: 2020-09-11 | End: 2020-09-12 | Stop reason: HOSPADM

## 2020-09-11 RX ORDER — SODIUM CHLORIDE 9 MG/ML
INJECTION, SOLUTION INTRAVENOUS CONTINUOUS
Status: DISCONTINUED | OUTPATIENT
Start: 2020-09-11 | End: 2020-09-11 | Stop reason: HOSPADM

## 2020-09-11 RX ORDER — ONDANSETRON 2 MG/ML
4 INJECTION INTRAMUSCULAR; INTRAVENOUS EVERY 6 HOURS PRN
Status: DISCONTINUED | OUTPATIENT
Start: 2020-09-11 | End: 2020-09-12 | Stop reason: HOSPADM

## 2020-09-11 RX ORDER — HEPARIN SODIUM 10000 [USP'U]/100ML
900 INJECTION, SOLUTION INTRAVENOUS CONTINUOUS
Status: DISCONTINUED | OUTPATIENT
Start: 2020-09-11 | End: 2020-09-12

## 2020-09-11 RX ORDER — ONDANSETRON 4 MG/1
4 TABLET, ORALLY DISINTEGRATING ORAL EVERY 6 HOURS PRN
Status: DISCONTINUED | OUTPATIENT
Start: 2020-09-11 | End: 2020-09-12 | Stop reason: HOSPADM

## 2020-09-11 RX ORDER — ACETAMINOPHEN 325 MG/1
650 TABLET ORAL EVERY 4 HOURS PRN
Status: DISCONTINUED | OUTPATIENT
Start: 2020-09-11 | End: 2020-09-12 | Stop reason: HOSPADM

## 2020-09-11 RX ADMIN — HEPARIN SODIUM 12 UNITS/KG/HR: 10000 INJECTION, SOLUTION INTRAVENOUS at 14:59

## 2020-09-11 RX ADMIN — SODIUM CHLORIDE: 9 INJECTION, SOLUTION INTRAVENOUS at 12:41

## 2020-09-11 RX ADMIN — SODIUM CHLORIDE 500 ML: 9 INJECTION, SOLUTION INTRAVENOUS at 12:08

## 2020-09-11 RX ADMIN — Medication 4500 UNITS: at 14:58

## 2020-09-11 RX ADMIN — DILTIAZEM HYDROCHLORIDE 10 MG: 5 INJECTION INTRAVENOUS at 13:16

## 2020-09-11 RX ADMIN — DILTIAZEM HYDROCHLORIDE 10 MG: 5 INJECTION INTRAVENOUS at 12:08

## 2020-09-11 RX ADMIN — DILTIAZEM HYDROCHLORIDE 10 MG/HR: 5 INJECTION INTRAVENOUS at 12:44

## 2020-09-11 ASSESSMENT — ENCOUNTER SYMPTOMS
LIGHT-HEADEDNESS: 1
CONSTIPATION: 0
SHORTNESS OF BREATH: 0
ABDOMINAL PAIN: 0
DIAPHORESIS: 0
SORE THROAT: 0
NAUSEA: 0
DIARRHEA: 0
SINUS PRESSURE: 0
VOMITING: 0
WHEEZING: 0
FEVER: 0
DYSURIA: 0
COUGH: 0
BLOOD IN STOOL: 0
CHILLS: 0
HEADACHES: 0
PALPITATIONS: 1
FREQUENCY: 0

## 2020-09-11 ASSESSMENT — MIFFLIN-ST. JEOR: SCORE: 1631.33

## 2020-09-11 NOTE — CONSULTS
Patient to start the heparin C-V/Vascular protocol with a goal anti 10a level of 0.15-0.35. Using a HT of 68 inches and a WT of 86.2 kg, a dosing WT of 75.5 kg was calculated. Based on this dosing WT, give patient a heparin bolus of 4500 units from the bag and then start a drip at 900 units/hr. Check the patient's anti 10a level 6 hours after starting the drip at ~1845.   Per C-V/Vascular protocol.    Nina StallworthD

## 2020-09-11 NOTE — ED NOTES
Lightheadedness and near syncope that started at 0800 this morning while patient was at work.  Patient denies chest pain, shortness of breath, cough.  No heart history.

## 2020-09-11 NOTE — PHARMACY-ADMISSION MEDICATION HISTORY
Pharmacy Medication History      Patient reports using no medications prior to this admission.    Vladimir WoodD

## 2020-09-11 NOTE — ED PROVIDER NOTES
History     Chief Complaint   Patient presents with     Dizziness     Pt reports feeling lightheaded, pt reports syptoms worsens when bending over then standing up. Pt reports started at 8AM      HPI  Quique Lyons is a 65 year old male who presents with a history of COPD tobacco abuse.  He works as a monsalve.  He had onset this morning upon standing of feeling lightheaded approximately 8 AM.  There was no vertigo.  No associated weakness.  He sat down with minimal effect.  No associated chest pain.  May have felt palpitations and respiratory was packed and is periodically felt that in the past.  He has not been aware of being in atrial fibrillation.  Has no associated chest pain or shortness of breath.  He has no thyroid disorder.   He has no known heart disease.  No diabetes.    He initially told me there was no alcohol use last evening.  However last evening he does tell me that he used a cocktail and a beer.      Allergies:  No Known Allergies    Problem List:    Patient Active Problem List    Diagnosis Date Noted     Chronic obstructive pulmonary disease with acute exacerbation (HCC) 12/01/2015     Priority: Medium     Tobacco abuse 04/06/2015     Priority: Medium     Advanced directives, counseling/discussion 01/13/2014     Priority: Medium     Discussed Advance Directive planning with patient; however, patient declined at this time.             Past Medical History:    No past medical history on file.    Past Surgical History:    No past surgical history on file.    Family History:    Family History   Problem Relation Age of Onset     Dementia Father        Social History:  Marital Status:   [2]  Social History     Tobacco Use     Smoking status: Current Every Day Smoker     Smokeless tobacco: Never Used   Substance Use Topics     Alcohol use: Yes     Drug use: No        Medications:    albuterol (PROAIR HFA, PROVENTIL HFA, VENTOLIN HFA) 108 (90 BASE) MCG/ACT inhaler      Review of Systems  "  Constitutional: Negative for chills, diaphoresis and fever.   HENT: Negative for ear pain, sinus pressure and sore throat.    Eyes: Negative for visual disturbance.   Respiratory: Negative for cough, shortness of breath and wheezing.    Cardiovascular: Positive for palpitations. Negative for chest pain.   Gastrointestinal: Negative for abdominal pain, blood in stool, constipation, diarrhea, nausea and vomiting.   Genitourinary: Negative for dysuria, frequency and urgency.   Skin: Negative for rash.   Neurological: Positive for light-headedness. Negative for headaches.   All other systems reviewed and are negative.      Physical Exam   BP: 121/76  Pulse: 144  Temp: 98.3  F (36.8  C)  Resp: 20  Height: 172.7 cm (5' 8\")  Weight: 86.2 kg (190 lb)  SpO2: 98 %      Physical Exam  Constitutional:       General: He is in acute distress.      Appearance: He is not toxic-appearing or diaphoretic.   Eyes:      Conjunctiva/sclera: Conjunctivae normal.   Neck:      Musculoskeletal: Neck supple.   Cardiovascular:      Rate and Rhythm: Tachycardia present. Rhythm irregular.      Pulses: Normal pulses.      Heart sounds: Normal heart sounds. No murmur.   Pulmonary:      Effort: Pulmonary effort is normal. No respiratory distress.      Breath sounds: Normal breath sounds. No stridor. No wheezing.   Abdominal:      General: Abdomen is flat. Bowel sounds are normal. There is no distension.      Palpations: There is no mass.      Tenderness: There is no abdominal tenderness. There is no guarding.   Musculoskeletal:      Right lower leg: No edema.      Left lower leg: No edema.   Skin:     Coloration: Skin is not pale.      Findings: No rash.   Neurological:      General: No focal deficit present.      Mental Status: He is alert and oriented to person, place, and time.      Motor: No weakness.         ED Course        Procedures                  EKG Interpretation:      Interpreted by Rivera Gonzalez MD  EKG done at 1156 hrs. " demonstrates a tachycardia that is likely atrial fibrillation rapid ventricular rates at 142 bpm with a normal axis and no ST change.  No significant T wave change.  There is a normal R progression.  No Q waves.  Normal intervals.  This is with exception of a P wave absent and QRS borderline with 116.  There is a QRS configuration in the inferior leads that likely represents partial block.  No ectopy.  Impression likely atrial fibrillation rapid ventricular rate 142    Critical Care time:  none               Results for orders placed or performed during the hospital encounter of 09/11/20 (from the past 24 hour(s))   Glucose by meter   Result Value Ref Range    Glucose 146 (H) 70 - 99 mg/dL   CBC with platelets, differential   Result Value Ref Range    WBC 12.0 (H) 4.0 - 11.0 10e9/L    RBC Count 5.49 4.4 - 5.9 10e12/L    Hemoglobin 17.3 13.3 - 17.7 g/dL    Hematocrit 51.6 40.0 - 53.0 %    MCV 94 78 - 100 fl    MCH 31.5 26.5 - 33.0 pg    MCHC 33.5 31.5 - 36.5 g/dL    RDW 12.2 10.0 - 15.0 %    Platelet Count 247 150 - 450 10e9/L    Diff Method Automated Method     % Neutrophils 63.3 %    % Lymphocytes 27.0 %    % Monocytes 7.4 %    % Eosinophils 1.1 %    % Basophils 0.8 %    % Immature Granulocytes 0.4 %    Nucleated RBCs 0 0 /100    Absolute Neutrophil 7.6 1.6 - 8.3 10e9/L    Absolute Lymphocytes 3.2 0.8 - 5.3 10e9/L    Absolute Monocytes 0.9 0.0 - 1.3 10e9/L    Absolute Eosinophils 0.1 0.0 - 0.7 10e9/L    Absolute Basophils 0.1 0.0 - 0.2 10e9/L    Abs Immature Granulocytes 0.1 0 - 0.4 10e9/L    Absolute Nucleated RBC 0.0    Comprehensive metabolic panel   Result Value Ref Range    Sodium 139 133 - 144 mmol/L    Potassium 4.1 3.4 - 5.3 mmol/L    Chloride 107 94 - 109 mmol/L    Carbon Dioxide 27 20 - 32 mmol/L    Anion Gap 5 3 - 14 mmol/L    Glucose 134 (H) 70 - 99 mg/dL    Urea Nitrogen 15 7 - 30 mg/dL    Creatinine 0.89 0.66 - 1.25 mg/dL    GFR Estimate >90 >60 mL/min/[1.73_m2]    GFR Estimate If Black >90 >60  mL/min/[1.73_m2]    Calcium 8.8 8.5 - 10.1 mg/dL    Bilirubin Total 0.6 0.2 - 1.3 mg/dL    Albumin 4.0 3.4 - 5.0 g/dL    Protein Total 7.3 6.8 - 8.8 g/dL    Alkaline Phosphatase 80 40 - 150 U/L    ALT 23 0 - 70 U/L    AST 17 0 - 45 U/L   Troponin I   Result Value Ref Range    Troponin I ES <0.015 0.000 - 0.045 ug/L   TSH with free T4 reflex   Result Value Ref Range    TSH 1.32 0.40 - 4.00 mU/L   INR   Result Value Ref Range    INR 1.09 0.86 - 1.14   Partial thromboplastin time   Result Value Ref Range    PTT 32 22 - 37 sec   Chest XR,  PA & LAT    Narrative    XR CHEST 2 VW 9/11/2020 12:35 PM    HISTORY: Atrial fibrillation. RVR.    COMPARISON: 3/18/2014    FINDINGS: The lungs are clear. No airspace consolidation,  pneumothorax, or pleural fluid. Heart size and pulmonary vasculature  are normal appearing. Minimal multilevel hypertrophic degenerative  changes of the spine. No acute osseous abnormality.      Impression    IMPRESSION: No acute cardiopulmonary process.    KIRAN SUNG MD   Pharmacy to Dose Heparin    [facility]     9/11/2020  2:35 PM  Patient to start the heparin C-V/Vascular protocol with a goal   anti 10a level of 0.15-0.35. Using a HT of 68 inches and a WT of   86.2 kg, a dosing WT of 75.5 kg was calculated. Based on this   dosing WT, give patient a heparin bolus of 4500 units from the   bag and then start a drip at 900 units/hr. Check the patient's   anti 10a level 6 hours after starting the drip at ~1845.   Per C-V/Vascular protocol.    Jena Vidales PharmD         Medications   0.9% sodium chloride BOLUS (500 mLs Intravenous New Bag 9/11/20 1208)     Followed by   sodium chloride 0.9% infusion (has no administration in time range)   diltiazem (CARDIZEM) 125 mg in sodium chloride 0.9 % 125 mL infusion (has no administration in time range)   diltiazem (CARDIZEM) injection 10 mg (10 mg Intravenous Given 9/11/20 1208)       Assessments & Plan (with Medical Decision  Making)     MDM: Quique Lyons is a 65 year old male presents with lightheadedness acute onset 8 AM with no known history of atrial fibrillation and no alcohol, no thyroid disorder, no known heart disease but underlying COPD presenting in what is likely atrial fibrillation rapid ventricular rate.  Systolic blood pressure is 130 but suspect that the initial diltiazem bolus will drop blood pressure and will give only 10 mg IV bolus plus fluid and initiate IV drip.  Will attempt to obtain echocardiogram today once heart rate is slowed due to weekend starting tomorrow.  We will plan for ICU admission on diltiazem.  Discussed anticoagulation.  Although chads Vasc will be relatively low, he will likely want cardioversion attempt after 6 weeks and would recommend initiating anticoagulation.  Will discuss further with hospitalist once labs returned.  Troponin TSH chest x-ray as well as comprehensive panel and CBC obtained.      The laboratory testing is reassuring but unfortunately despite 10 mg IV diltiazem bolus followed by diltiazem drip did not drop heart rate and systolic blood pressure dropped into the 110 range.  We then give another dose of 10 mg IV bolus of diltiazem and this resulted in hypotension into the 70-90 range systolic which recovered quickly after IV fluid bolus and stopping temporarily the diltiazem drip.  I discussed with Dr. Taylor cardiology and as I do not know the timing of his onset it could have been months that he has been in atrial fibrillation and rapid ventricular rate only today agrees that cardioversion would be potentially with some risk although the risk of the chads vascular score of 0.  Low and likely safe.  Recommends heparin and transfer.  I discussed with the patient and he understands the plan.  I suspect that the refractory nature of his rhythm is that much of this is atrial flutter with 2-1 with occl 4:1 which I would expect to be more refractory to rate control agents and more  amenable to cardioversion.  I discussed with Dr. Grimes and she accepts for transfer.    I have reviewed the nursing notes.    I have reviewed the findings, diagnosis, plan and need for follow up with the patient.       New Prescriptions    No medications on file       Final diagnoses:   Atrial fibrillation with RVR (H)       9/11/2020   [facility] EMERGENCY DEPARTMENT     Rivera Gonzalez MD  09/11/20 1907       Rivera Gonzalez MD  09/11/20 1912

## 2020-09-11 NOTE — PROGRESS NOTES
Called by ED MD from [facility]. Patients with no major medical issues now in the ED with symptomatic AF for unknown duration. Hypotension with AV veronica blockade. Only if emergent due to hemodynamic instability, can cardioversion be performed without assessment of LIZ understanding low risk of CVA still despite 0 [id number] score (assuming he does not have occult acute LV dysfunction) other wise recommend transfer to [facility] if HR is still uncontrolled and BP is low, on anticoagulation for further evaluation and potential consideration of NANCY guided cardioversion.

## 2020-09-11 NOTE — TELEPHONE ENCOUNTER
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  Quique had to grab on to the wall.  Quique also states that he is having an irregular heart beat.  \"Feels like my heart is beating so heavily\".  Denies numbness.    COVID 19 Nurse Triage Plan/Patient Instructions    Please be aware that novel coronavirus (COVID-19) may be circulating in the community. If you develop symptoms such as fever, cough, or SOB or if you have concerns about the presence of another infection including coronavirus (COVID-19), please contact your health care provider or visit [email or web address].     Disposition/Instructions    ED Visit recommended. Follow protocol based instructions.     Bring Your Own Device:  Please also bring your smart device(s) (smart phones, tablets, laptops) and their charging cables for your personal use and to communicate with your care team during your visit.    Thank you for taking steps to prevent the spread of this virus.  o Limit your contact with others.  o Wear a simple mask to cover your cough.  o Wash your hands well and often.    Resources    [facility]: About COVID-19: [email or web address]    CDC: What to Do If You're Sick: www.cdc.gov/coronavirus/2019-ncov/about/steps-when-sick.html    CDC: Ending Home Isolation: www.cdc.gov/coronavirus/2019-ncov/hcp/disposition-in-home-patients.html     CDC: Caring for Someone: www.cdc.gov/coronavirus/2019-ncov/if-you-are-sick/care-for-someone.html     [facility]: Interim Guidance for Hospital Discharge to Home: www.health.[facility].[place].us/diseases/coronavirus/hcp/hospdischarge.pdf    [facility] clinical trials (COVID-19 research studies): [email or web address]     Below are the COVID-19 hotlines at the [place] Department of Health ([facility]). Interpreters are available.   o For health questions: Call 945-932-8024 or " "1-446.954.9713 (7 a.m. to 7 p.m.)  o For questions about schools and childcare: Call 253-504-8641 or 1-557.984.1017 (7 a.m. to 7 p.m.)                       Reason for Disposition    Extra heart beats OR irregular heart beating  (i.e., \"palpitations\")    Additional Information    Negative: Severe difficulty breathing (e.g., struggling for each breath, speaks in single words)    Negative: [1] Difficulty breathing or swallowing AND [2] started suddenly after medicine, an allergic food or bee sting    Negative: Shock suspected (e.g., cold/pale/clammy skin, too weak to stand, low BP, rapid pulse)    Negative: Difficult to awaken or acting confused (e.g., disoriented, slurred speech)    Negative: [1] Weakness (i.e., paralysis, loss of muscle strength) of the face, arm or leg on one side of the body AND [2] sudden onset AND [3] present now    Negative: [1] Numbness (i.e., loss of sensation) of the face, arm or leg on one side of the body AND [2] sudden onset AND [3] present now    Negative: [1] Loss of speech or garbled speech AND [2] sudden onset AND [3] present now    Negative: Overdose (accidental or intentional) of medications    Negative: [1] Fainted > 15 minutes ago AND [2] still feels too weak or dizzy to stand    Negative: Heart beating < 50 beats per minute OR > 140 beats per minute    Negative: Sounds like a life-threatening emergency to the triager    Negative: Difficulty breathing    Negative: SEVERE dizziness (e.g., unable to stand, requires support to walk, feels like passing out now)    Protocols used: DIZZINESS - [id number]      "

## 2020-09-11 NOTE — ED NOTES
Patient's blood pressure 76/48.  Provider notified.  Per provider IV Diltiazem stopped.  IV fluid bolus started.

## 2020-09-12 ENCOUNTER — APPOINTMENT (OUTPATIENT)
Dept: CARDIOLOGY | Facility: CLINIC | Age: 65
End: 2020-09-12
Attending: INTERNAL MEDICINE
Payer: COMMERCIAL

## 2020-09-12 VITALS
HEIGHT: 68 IN | OXYGEN SATURATION: 94 % | WEIGHT: 196.5 LBS | HEART RATE: 61 BPM | RESPIRATION RATE: 18 BRPM | SYSTOLIC BLOOD PRESSURE: 122 MMHG | TEMPERATURE: 98.3 F | BODY MASS INDEX: 29.78 KG/M2 | DIASTOLIC BLOOD PRESSURE: 71 MMHG

## 2020-09-12 LAB
ANION GAP SERPL CALCULATED.3IONS-SCNC: 1 MMOL/L (ref 3–14)
BUN SERPL-MCNC: 13 MG/DL (ref 7–30)
CALCIUM SERPL-MCNC: 8.4 MG/DL (ref 8.5–10.1)
CHLORIDE SERPL-SCNC: 113 MMOL/L (ref 94–109)
CO2 SERPL-SCNC: 28 MMOL/L (ref 20–32)
CREAT SERPL-MCNC: 0.92 MG/DL (ref 0.66–1.25)
ERYTHROCYTE [DISTWIDTH] IN BLOOD BY AUTOMATED COUNT: 12.9 % (ref 10–15)
GFR SERPL CREATININE-BSD FRML MDRD: 88 ML/MIN/{1.73_M2}
GLUCOSE SERPL-MCNC: 119 MG/DL (ref 70–99)
HCT VFR BLD AUTO: 45.4 % (ref 40–53)
HGB BLD-MCNC: 15.1 G/DL (ref 13.3–17.7)
LMWH PPP CHRO-ACNC: 0.16 IU/ML
MCH RBC QN AUTO: 31.5 PG (ref 26.5–33)
MCHC RBC AUTO-ENTMCNC: 33.3 G/DL (ref 31.5–36.5)
MCV RBC AUTO: 95 FL (ref 78–100)
PLATELET # BLD AUTO: 225 10E9/L (ref 150–450)
POTASSIUM SERPL-SCNC: 4.4 MMOL/L (ref 3.4–5.3)
RBC # BLD AUTO: 4.79 10E12/L (ref 4.4–5.9)
SODIUM SERPL-SCNC: 142 MMOL/L (ref 133–144)
WBC # BLD AUTO: 7.8 10E9/L (ref 4–11)

## 2020-09-12 PROCEDURE — G0378 HOSPITAL OBSERVATION PER HR: HCPCS

## 2020-09-12 PROCEDURE — 96366 THER/PROPH/DIAG IV INF ADDON: CPT

## 2020-09-12 PROCEDURE — 99204 OFFICE O/P NEW MOD 45 MIN: CPT | Mod: 25 | Performed by: INTERNAL MEDICINE

## 2020-09-12 PROCEDURE — 80048 BASIC METABOLIC PNL TOTAL CA: CPT | Performed by: INTERNAL MEDICINE

## 2020-09-12 PROCEDURE — 93306 TTE W/DOPPLER COMPLETE: CPT | Mod: 26 | Performed by: INTERNAL MEDICINE

## 2020-09-12 PROCEDURE — 25500064 ZZH RX 255 OP 636: Performed by: INTERNAL MEDICINE

## 2020-09-12 PROCEDURE — 85520 HEPARIN ASSAY: CPT | Performed by: INTERNAL MEDICINE

## 2020-09-12 PROCEDURE — 99217 ZZC OBSERVATION CARE DISCHARGE: CPT | Performed by: INTERNAL MEDICINE

## 2020-09-12 PROCEDURE — 40000264 ECHOCARDIOGRAM COMPLETE

## 2020-09-12 PROCEDURE — 36415 COLL VENOUS BLD VENIPUNCTURE: CPT | Performed by: INTERNAL MEDICINE

## 2020-09-12 PROCEDURE — 25000132 ZZH RX MED GY IP 250 OP 250 PS 637: Performed by: INTERNAL MEDICINE

## 2020-09-12 PROCEDURE — 85027 COMPLETE CBC AUTOMATED: CPT | Performed by: INTERNAL MEDICINE

## 2020-09-12 RX ORDER — DILTIAZEM HYDROCHLORIDE 180 MG/1
180 CAPSULE, COATED, EXTENDED RELEASE ORAL DAILY
Status: DISCONTINUED | OUTPATIENT
Start: 2020-09-12 | End: 2020-09-12 | Stop reason: HOSPADM

## 2020-09-12 RX ORDER — DILTIAZEM HYDROCHLORIDE 180 MG/1
180 CAPSULE, COATED, EXTENDED RELEASE ORAL DAILY
Qty: 30 CAPSULE | Refills: 0 | Status: SHIPPED | OUTPATIENT
Start: 2020-09-12 | End: 2020-10-09

## 2020-09-12 RX ADMIN — HUMAN ALBUMIN MICROSPHERES AND PERFLUTREN 9 ML: 10; .22 INJECTION, SOLUTION INTRAVENOUS at 08:44

## 2020-09-12 RX ADMIN — DILTIAZEM HYDROCHLORIDE 180 MG: 180 CAPSULE, COATED, EXTENDED RELEASE ORAL at 13:01

## 2020-09-12 ASSESSMENT — MIFFLIN-ST. JEOR: SCORE: 1660.82

## 2020-09-12 NOTE — PROVIDER NOTIFICATION
MD Notification    Notified Person: MD    Notified Person Name: Dr. Clancy    Notification Date/Time: 09/11/20 10:34 PM     Notification Interaction:    Purpose of Notification: FYI pt converted to SR on diltiazem drip. Drip is running at 5mg/hour, HR 50s, BP stable. Continue dilt overnight? Thank you, Ayleen RODRIGUEZ CSC     Orders Received: Received call back, ok to turn diltiazem drip off    Comments:

## 2020-09-12 NOTE — PROGRESS NOTES
Care Coordination:    Patient noted to have no primary PCP per Dr. Gamboa.  Patient set up with new PCP at [facility]-see ROOPAS.    Maryann Calvo RN  BSN, Care Coordinator    [facility]/ Care Transitions          [email or web address]                Phone [phone]

## 2020-09-12 NOTE — PLAN OF CARE
Vital signs stable. Medications discussed, Information reviewed, Questions answered, and concerns addressed. Follow-up Instructions reviewed. Pt belongings accounted for and sent home with them. Pt left walking escorted by RN and driven by wife.

## 2020-09-12 NOTE — DISCHARGE SUMMARY
[facility]    Discharge Summary  Hospitalist    Date of Admission:  9/11/2020  Date of Discharge:  9/12/2020  Discharging Provider: Diana Gamboa    Discharge Diagnoses   Atrial flutter with RVR, spontaneously converted back to NSR  Tobacco Use    History of Present Illness   Quique Lyons is a very pleasant 65-year-old male with past medical history significant only for tobacco use who presented to [facility] ED on 9/11/2020 for evaluation of new onset lightheadedness which began earlier that morning.  He was found to be in atrial flutter with rapid ventricular response. He was initiated on heparin and diltiazem drips and transferred to [facility] for ongoing cardiac evaluation (was thought that he may need NANCY and cardioversion).     Hospital Course   Quique Lyons was admitted on 9/11/2020.  The following problems were addressed during his hospitalization:    Atrial Flutter with RVR, now back in NSR  Only complaints on admission were new onset lightheadedness and some palpitations earlier that started earlier that morning, but may have had period episodes of palpitations in the past.  No known history of arrhythmias.  He was initially noted to be in RVR with heart rates in the 140s. Initial attempts at IV diltiazem resulted in hypotension. This appears to have improved after a fluid bolus.  Rates subsequently remained well controlled on diltiazem gtt and he spontaneously converted back to NSR on the night of admission. Remained in NSR. Trop nl. Echocardiogram showed nl EF, no valve disease, possible PFO. Seen by EP cardiology (Dr. Seth) -- recommended diltiazem ER 180mg po daily. No need for anticoagulation.     Will follow up with P Cardiology at [facility]. Could consider ablation in the future.   Follow up with PCP. Should have evaluation for sleep apnea given reported snoring.     Tobacco Use  Smokes 1ppd for many years. Some wheezing noted on exam this stay.  Recommended to follow up with PCP. May want to consider PFTs for evaluation of possible COPD.     Diana Gamboa DO    Code Status   Full Code       Primary Care Physician   [facility]    Physical Exam   Temp: 98.3  F (36.8  C) Temp src: Oral BP: 122/71 Pulse: 61   Resp: 18 SpO2: 94 % O2 Device: None (Room air)    Vitals:    09/12/20 0335   Weight: 89.1 kg (196 lb 8 oz)     Vital Signs with Ranges  Temp:  [98.3  F (36.8  C)-98.8  F (37.1  C)] 98.3  F (36.8  C)  Pulse:  [] 61  Resp:  [8-23] 18  BP: ()/(50-98) 122/71  SpO2:  [94 %-99 %] 94 %  I/O last 3 completed shifts:  In: -   Out: 400 [Urine:400]    General: Resting comfortably, alert, conversive, NAD  CVS: HRRR, no MGR, no LE edema  Respiratory: few faint exp wheezes but otherwise CTAB, no rales/rhonchi, no increased work of berathing  GI: S, NT, ND, +BS  Skin: Warm/dry    Discharge Disposition   Discharged to home  Condition at discharge: Stable    Consultations This Hospital Stay   PHARMACY TO DOSE HEPARIN  CARDIOLOGY IP CONSULT    Time Spent on this Encounter   IDiana DO, personally saw the patient today and spent greater than 30 minutes discharging this patient.    Discharge Orders      Reason for your hospital stay    Evaluation of your abnormal heart rhythm called atrial flutter. Your heart spontaneously converted back to a normal rhythm. You were started on a new medication to help better control your heart rates.     Follow-up and recommended labs and tests     Follow up with [facility] at [facility] in the coming weeks as advised  Follow up with your PCP in the next week  You should have a sleep study to evaluate for possible sleep apnea.   You need to stop smoking.     Activity    Your activity upon discharge: activity as tolerated     Diet    Follow this diet upon discharge: Regular     Discharge Medications   Current Discharge Medication List      START taking these medications    Details    diltiazem ER COATED BEADS (CARDIZEM CD/CARTIA XT) 180 MG 24 hr capsule Take 1 capsule (180 mg) by mouth daily  Qty: 30 capsule, Refills: 0    Associated Diagnoses: Atrial flutter with rapid ventricular response (H)           Allergies   No Known Allergies     Data   Most Recent 3 CBC's:  Recent Labs   Lab Test 20  0603 20  1155   WBC 7.8 12.0*   HGB 15.1 17.3   MCV 95 94    247      Most Recent 3 BMP's:  Recent Labs   Lab Test 20  0603 20  1155    139   POTASSIUM 4.4 4.1   CHLORIDE 113* 107   CO2 28 27   BUN 13 15   CR 0.92 0.89   ANIONGAP 1* 5   WESLEY 8.4* 8.8   * 134*     Most Recent 2 LFT's:  Recent Labs   Lab Test 20  1155   AST 17   ALT 23   ALKPHOS 80   BILITOTAL 0.6     Most Recent INR's and Anticoagulation Dosing History:  Anticoagulation Dose History     Recent Dosing and Labs Latest Ref Rng & Units 2020    INR 0.86 - 1.14 1.09        Most Recent 3 Troponin's:  Recent Labs   Lab Test 20  1155   TROPI <0.015     Most Recent TSH, T4 and A1c Labs:  Recent Labs   Lab Test 20  1155   TSH 1.32     Results for orders placed or performed during the hospital encounter of 20   Echocardiogram Complete    Narrative    [id number]  [id number]  [id number]  [id number]^GERARD^JENNIFER^JARRETT           [facility]  Echocardiography Laboratory  [address] [place] [address]        Name: BEBO CHÁVEZ  MRN: [id number]  : 1955  Study Date: 2020 08:15 AM  Age: 65 yrs  Gender: Male  Patient Location: [facility]  Reason For Study: Afib  Ordering Physician: JENNIFER GEE  Referring Physician: [facility], [facility]  Performed By: Ledy Rogel     HR: 64  BP: 106/61 mmHg  _____________________________________________________________________________  __     Procedure  Complete Portable Echo Adult. Optison (NDC #[id number]) given intravenously.     _____________________________________________________________________________  __         Interpretation Summary     Left ventricular systolic function is normal.  The visual ejection fraction is estimated at 50-55%.  The right ventricle is normal in structure, function and size.  Normal estimated PA systolic pressure.  No significant valve dysfunction.  Possible patent foramen ovale.  The inferior vena cava was normal in size with preserved respiratory  variability.  There is no pericardial effusion.     No prior for comparison.  _____________________________________________________________________________  __        Left Ventricle  The left ventricle is normal in size. There is normal left ventricular wall  thickness. Left ventricular systolic function is normal. The visual ejection  fraction is estimated at 50-55%. Left ventricular diastolic function is  normal. Normal left ventricular wall motion.     Right Ventricle  The right ventricle is normal in structure, function and size.     Atria  Normal left atrial size. Right atrial size is normal. A patent foramen ovale  is suspected.     Mitral Valve  There is mild mitral annular calcification. There is trace mitral  regurgitation.     Tricuspid Valve  The tricuspid valve is normal in structure and function. There is mild (1+)  tricuspid regurgitation. The right ventricular systolic pressure is  approximated at 25.5 mmHg plus the right atrial pressure. Right ventricular  systolic pressure is normal.        Aortic Valve  The aortic valve is normal in structure and function.     Pulmonic Valve  The pulmonic valve is normal in structure and function.     Vessels  The aortic root is normal size. Normal size ascending aorta. The inferior vena  cava was normal in size with preserved respiratory variability.     Pericardium  There is no pericardial effusion.     _____________________________________________________________________________  __  MMode/2D Measurements & Calculations  IVSd: 0.89 cm  LVIDd: 5.8 cm  LVIDs: 4.2 cm  LVPWd: 1.0 cm  FS: 27.2 %  LV  mass(C)d: 224.1 grams  LA dimension: 3.9 cm  LA Volume (BP): 66.5 ml     LA Volume Index (BP): 32.8 ml/m2  RWT: 0.36        Doppler Measurements & Calculations  MV E max alta: 98.5 cm/sec  MV A max alta: 65.0 cm/sec  MV E/A: 1.5  MV dec slope: 500.7 cm/sec2  PA acc time: 0.12 sec  TR max alta: 252.5 cm/sec  TR max P.5 mmHg  E/E' av.5  Lateral E/e': 7.5  Medial E/e': 9.5              _____________________________________________________________________________  __           Report approved by: Cali Garcia 2020 11:07 AM

## 2020-09-12 NOTE — CONSULTS
CARDIAC ELECTROPHYSIOLOGY CONSULTATION  September 12, 2020    REQUESTING PROVIDER:  SHAGGY Gamboa MD    REASON FOR CONSULTATION:  Atrial flutter with RVR.       HISTORY OF PRESENT ILLNESS:    Thank you for asking EP to evaluate this pleasant 65-year-old male for atrial flutter with RVR.  The patient has no prior history of cardiac disease and takes no home medications.    Yesterday morning he developed dizziness which worsened to the point that he felt he might pass out.  He went to the emergency room at [facility] and was found to be in atrial flutter with RVR.  After receiving 2 intravenous diltiazem boluses his systolic blood pressure dropped into the 70s to 90s but his heart rate remained elevated.  Due to the unknown time of onset cardioversion was not performed.  The patient was sent to [facility].    He was placed on intravenous diltiazem and heparin drip.  Around 9 PM last night he converted to sinus rhythm.    Over the past year he has had 5 or 6 events where his heart raced briefly, no more than 1 to 2 minutes.  He never felt as lightheaded as he was yesterday.  Does not have history of syncope.  He does not have chest pain with exertion.    The patient is a monsalve.  He is  and lives with his wife.  He smokes and drinks on average 1 alcoholic beverage per day.  His wife states that he snores loudly and sometimes stops breathing at night.    Family history is negative for premature coronary artery disease or atrial fibrillation/flutter.      DIAGNOSTIC STUDIES:  Admission Labs: Sodium 142, potassium 4.4, creatinine 0.92, hematocrit 45%, COVID test negative.  12-lead ECG:  typical atrial flutter with 2-1 AV conduction  Echocardiogram: Normal echocardiogram with EF low normal 50 to 55%, no valvular abnormalities, possible PFO.      IMPRESSION:  1. Typical atrial flutter with RVR and severe symptoms.  Spontaneous termination.  Total duration less than 24 hours. [id number] is 0.   "No need for anticoagulation.  2. Structurally normal heart by echocardiogram.    RECOMMENDATIONS:    1. Discussed medical therapy with diltiazem (would avoid beta-blocker because of active wheezing) versus catheter ablation.  Given the severity of symptoms during his first episode of atrial flutter, I believe that pursuing ablation is reasonable.  However, today is Saturday and we cannot perform the procedure over the weekend.  Should he decide to pursue it, he can contact us and we will can arrange it on an outpatient basis.  2. Follow-up with [facility] at [facility] has been requested.  3. Outpatient sleep evaluation is reasonable.    Thank you for involving us in his care.    Angeli Seth MD, [facility]        PHYSICAL EXAM:  Vitals: /71 (BP Location: Left arm)   Pulse 61   Temp 98.3  F (36.8  C) (Oral)   Resp 18   Ht 1.727 m (5' 8\")   Wt 89.1 kg (196 lb 8 oz)   SpO2 94%   BMI 29.88 kg/m      Intake/Output Summary (Last 24 hours) at 9/12/2020 1104  Last data filed at 9/12/2020 0336  Gross per 24 hour   Intake --   Output 400 ml   Net -400 ml     Vitals:    09/12/20 0335   Weight: 89.1 kg (196 lb 8 oz)     Constitutional:  Alert and oriented x3.  Pt appears comfortable, has no CP or respiratory distress.  Head: Normocephalic and atraumatic.   Skin:  Normal color and texture.  No rashes, lesions or eruptions.  Eyes:  PERRL, EOMI.  ENT:  Supple, normal JVP.  No lymphadenopathy or apparent thyroid enlargement.  Chest/Lungs:  Bilateral wheezing.  Cardiac:  Regular rhythm, normal S1 and S2.  No murmur, rub or gallop.    Abdomen:  Normal bowel sounds. Abdomen is soft, non-tender & not distended.  No rebound or guarding.    Extremities:  Radial pulses 2+ bilaterally.  DP and PT pulses palpable bilaterally.  No lower extremity edema is present.   Neurological:  CN 2-12 grossly intact.  Strength appears symmetric     Back:  No CVA tenderness.     REVIEW OF SYSTEMS:  Review of system completed and negative with the " exception of what was described in the HPI section.     CURRENT MEDICATIONS:    diltiazem ER COATED BEADS  180 mg Oral Daily       ALLERGIES   No Known Allergies    PAST MEDICAL HISTORY:  No past medical history on file.    PAST SURGICAL HISTORY:  No past surgical history on file.    FAMILY HISTORY:  Family History   Problem Relation Age of Onset     Dementia Father        SOCIAL HISTORY:  Social History     Socioeconomic History     Marital status:      Spouse name: Not on file     Number of children: Not on file     Years of education: Not on file     Highest education level: Not on file   Occupational History     Not on file   Social Needs     Financial resource strain: Not on file     Food insecurity     Worry: Not on file     Inability: Not on file     Transportation needs     Medical: Not on file     Non-medical: Not on file   Tobacco Use     Smoking status: Current Every Day Smoker     Smokeless tobacco: Never Used   Substance and Sexual Activity     Alcohol use: Yes     Drug use: No     Sexual activity: Yes     Partners: Female   Lifestyle     Physical activity     Days per week: Not on file     Minutes per session: Not on file     Stress: Not on file   Relationships     Social connections     Talks on phone: Not on file     Gets together: Not on file     Attends Yazdanism service: Not on file     Active member of club or organization: Not on file     Attends meetings of clubs or organizations: Not on file     Relationship status: Not on file     Intimate partner violence     Fear of current or ex partner: Not on file     Emotionally abused: Not on file     Physically abused: Not on file     Forced sexual activity: Not on file   Other Topics Concern     Parent/sibling w/ CABG, MI or angioplasty before 65F 55M? Not Asked   Social History Narrative     Not on file         Recent Lab Results:  Recent Labs   Lab 09/12/20  0603 09/11/20  1155   WBC 7.8 12.0*   HGB 15.1 17.3   MCV 95 94    247   INR   --  1.09    139   POTASSIUM 4.4 4.1   CHLORIDE 113* 107   CO2 28 27   BUN 13 15   CR 0.92 0.89   ANIONGAP 1* 5   WESLEY 8.4* 8.8   * 134*   ALBUMIN  --  4.0   PROTTOTAL  --  7.3   BILITOTAL  --  0.6   ALKPHOS  --  80   ALT  --  23   AST  --  17   TROPI  --  <0.015

## 2020-09-12 NOTE — PLAN OF CARE
Patient arrived to [facility] at 18:25.  Patient on diltiazem drip and heparin drip.  Dr Gamboa here to see patient.  Awaiting orders.  Will continue to monitor patient closely.

## 2020-09-12 NOTE — PLAN OF CARE
A&Ox4. VSS on RA. Converted to SR last evening, diltiazem drip turned off. HR 50s-60s overnight. Plan for echo and cardiology consult today.

## 2020-09-14 ENCOUNTER — TELEPHONE (OUTPATIENT)
Dept: FAMILY MEDICINE | Facility: CLINIC | Age: 65
End: 2020-09-14

## 2020-09-14 NOTE — TELEPHONE ENCOUNTER
This pt was Discharged from [facility] on 09/12/2020 for Chief Complaint: Atrial Flutter With Rapid Ventricular Response (H)      Please note this information was received from either Rosemarie or Carmen GRIGSBY  IP daily report with JAN Adam identified as the PCP.    A follow-up visit has not been scheduled.    Please follow-up with patient accordingly.

## 2020-09-14 NOTE — TELEPHONE ENCOUNTER
Patient has a future appointment for hospital follow up already scheduled for next week.  Sheron Talley RN

## 2020-09-23 ENCOUNTER — OFFICE VISIT (OUTPATIENT)
Dept: FAMILY MEDICINE | Facility: CLINIC | Age: 65
End: 2020-09-23
Payer: COMMERCIAL

## 2020-09-23 VITALS
HEART RATE: 74 BPM | RESPIRATION RATE: 18 BRPM | TEMPERATURE: 97.2 F | SYSTOLIC BLOOD PRESSURE: 138 MMHG | DIASTOLIC BLOOD PRESSURE: 70 MMHG | HEIGHT: 68 IN | WEIGHT: 196 LBS | BODY MASS INDEX: 29.7 KG/M2 | OXYGEN SATURATION: 97 %

## 2020-09-23 DIAGNOSIS — Z23 NEED FOR VACCINATION: ICD-10-CM

## 2020-09-23 DIAGNOSIS — I48.91 ATRIAL FIBRILLATION WITH RVR (H): Primary | ICD-10-CM

## 2020-09-23 DIAGNOSIS — Z23 NEED FOR PROPHYLACTIC VACCINATION AND INOCULATION AGAINST INFLUENZA: ICD-10-CM

## 2020-09-23 DIAGNOSIS — L98.9 SKIN LESION: ICD-10-CM

## 2020-09-23 DIAGNOSIS — R06.83 SNORES: ICD-10-CM

## 2020-09-23 PROCEDURE — 90472 IMMUNIZATION ADMIN EACH ADD: CPT | Performed by: PHYSICIAN ASSISTANT

## 2020-09-23 PROCEDURE — 99214 OFFICE O/P EST MOD 30 MIN: CPT | Mod: 25 | Performed by: PHYSICIAN ASSISTANT

## 2020-09-23 PROCEDURE — 90471 IMMUNIZATION ADMIN: CPT | Performed by: PHYSICIAN ASSISTANT

## 2020-09-23 PROCEDURE — 90732 PPSV23 VACC 2 YRS+ SUBQ/IM: CPT | Performed by: PHYSICIAN ASSISTANT

## 2020-09-23 PROCEDURE — 90682 RIV4 VACC RECOMBINANT DNA IM: CPT | Performed by: PHYSICIAN ASSISTANT

## 2020-09-23 ASSESSMENT — MIFFLIN-ST. JEOR: SCORE: 1658.55

## 2020-09-23 NOTE — PROGRESS NOTES
"Subjective     Quique Lyons is a 65 year old male who presents to clinic today for the following health issues:    HPI         Hospital Follow-up Visit:    Hospital/Nursing Home/IP Rehab Facility: [facility]  Date of Admission: 9/11/20  Date of Discharge: 9/12/20  Reason(s) for Admission: Atrial Fib with RVR      Was your hospitalization related to COVID-19? No   Problems taking medications regularly:  None  Medication changes since discharge: started on Diltiazem   Problems adhering to non-medication therapy:  None    Summary of hospitalization:  [facility] discharge summary reviewed  Diagnostic Tests/Treatments reviewed.  Follow up needed: cardiology  Other Healthcare Providers Involved in Patient s Care:         None  Update since discharge: stable.       Post Discharge Medication Reconciliation: medication reconcilation previously completed during another office visit.  Plan of care communicated with patient              Discuss flu and pneumonia vaccines     Also history of snoring.     Also sore left side of nose for couple years, getting bigger.      He states he is a smoker.  He has never been diagnosed with COPD that he is aware of.  He does not elicit any wheezing or coughing episodes.  Review of Systems   Constitutional, HEENT, cardiovascular, pulmonary, gi and gu systems are negative, except as otherwise noted.      Objective    /70   Pulse 74   Temp 97.2  F (36.2  C) (Tympanic)   Resp 18   Ht 1.727 m (5' 8\")   Wt 88.9 kg (196 lb)   SpO2 97%   BMI 29.80 kg/m    Body mass index is 29.8 kg/m .  Physical Exam   GENERAL: healthy, alert and no distress  NECK: no adenopathy, no asymmetry, masses, or scars and thyroid normal to palpation  Nose: 1cm raised irregular shapped nodule.   RESP: lungs clear to auscultation - no rales, rhonchi or wheezes  CV: regular rate and rhythm, normal S1 S2, no S3 or S4, no murmur, click or rub, no peripheral edema and peripheral pulses strong  ABDOMEN: " "soft, nontender, no hepatosplenomegaly, no masses and bowel sounds normal  MS: no gross musculoskeletal defects noted, no edema    No results found for this or any previous visit (from the past 24 hour(s)).        Assessment & Plan       ICD-10-CM    1. Atrial fibrillation with RVR (H)  I48.91 SLEEP EVALUATION & MANAGEMENT REFERRAL - [facility] [place]  [phone] (Age 15 and up)     CARDIOLOGY EVAL ADULT REFERRAL   2. Snores  R06.83 SLEEP EVALUATION & MANAGEMENT REFERRAL - [facility] [place]  [phone] (Age 15 and up)   3. Skin lesion  L98.9 DERMATOLOGY ADULT REFERRAL   4. Need for prophylactic vaccination and inoculation against influenza  Z23 INFLUENZA QUAD, RECOMBINANT, P-FREE (RIV4) (FLUBLOCK) [78684]     Vaccine Administration, Initial [30789]   5. Need for vaccination  Z23 Pneumococcal vaccine 23 valent PPSV23  (Pneumovax) [29885]     Each additional admin.  (Right click and add QUANTITY)  [86986]   Talk to patient about his concerns.  We will get him set up for sleep medicine.  We will get a referral for audiology for follow-up.  We will get him set up for dermatology for evaluation of his skin lesion.  We will try to do so urgently.  Follow-up as needed.     Tobacco Cessation:   reports that he has been smoking. He has never used smokeless tobacco.  Tobacco Cessation Action Plan: Information offered: Patient not interested at this time      BMI:   Estimated body mass index is 29.8 kg/m  as calculated from the following:    Height as of this encounter: 1.727 m (5' 8\").    Weight as of this encounter: 88.9 kg (196 lb).   Weight management plan: Discussed healthy diet and exercise guidelines      No follow-ups on file.    Steven Adam PA-C  [facility]    "

## 2020-09-23 NOTE — LETTER
" My COPD Action Plan     Name: Quique Lyons    YOB: 1955   Date: 9/23/2020    My doctor: Steven Adam PA-C   [facility] [place] [address] [facility] [address] [phone]  My Controller Medicine: { :719114}   Dose: ***     My Rescue Medicine: { :643708}   Dose: ***     My Flare Up Medicine: { :611894}   Dose: ***     My COPD Severity: { :793005}      Use of Oxygen: { :125976::\"Oxygen Not Prescribed \"}     Make sure you've had your pneumonia   vaccines.          GREEN ZONE       Doing well today      Usual level of activity and exercise    Usual amount of cough and mucus    No shortness of breath    Usual level of health (thinking clearly, sleeping well, feel like eating) Actions:      Take daily medicines    Use oxygen as prescribed    Follow regular exercise and diet plan    Avoid cigarette smoke and other irritants that harm the lungs           YELLOW ZONE          Having a bad day or flare up      Short of breath more than usual    A lot more sputum (mucus) than usual    Sputum looks yellow, green, tan, brown or bloody    More coughing or wheezing    Fever or chills    Less energy; trouble completing activities    Trouble thinking or focusing    Using quick relief inhaler or nebulizer more often    Poor sleep; symptoms wake me up    Do not feel like eating Actions:      Get plenty of rest    Take daily medicines    Use quick relief inhaler every *** hours    If you use oxygen, call you doctor to see if you should adjust your oxygen    Do breathing exercises or other things to help you relax    Let a loved one, friend or neighbor know you are feeling worse    Call your care team if you have 2 or more symptoms.  Start taking steroids or antibiotics if directed by your care team           RED ZONE       Need medical care now      Severe shortness of breath (feel you can't breathe)    Fever, chills    Not enough breath to do any activity    Trouble coughing " up mucus, walking or talking    Blood in mucus    Frequent coughing   Rescue medicines are not working    Not able to sleep because of breathing    Feel confused or drowsy    Chest pain    Actions:      Call your health care team.  If you cannot reach your care team, call 911 or go to the emergency room.        Annual Reminders:  Meet with Care Team, Flu Shot every Fall  Pharmacy: [id number] - [facility] [address] [place] [address] AT [address] OF [address]

## 2020-09-23 NOTE — NURSING NOTE
Prior to immunization administration, verified patients identity using patient s name and date of birth. Please see Immunization Activity for additional information.     Screening Questionnaire for Adult Immunization    Are you sick today?   No   Do you have allergies to medications, food, a vaccine component or latex?   No   Have you ever had a serious reaction after receiving a vaccination?   No   Do you have a long-term health problem with heart, lung, kidney, or metabolic disease (e.g., diabetes), asthma, a blood disorder, no spleen, complement component deficiency, a cochlear implant, or a spinal fluid leak?  Are you on long-term aspirin therapy?   No   Do you have cancer, leukemia, HIV/AIDS, or any other immune system problem?   No   Do you have a parent, brother, or sister with an immune system problem?   No   In the past 3 months, have you taken medications that affect  your immune system, such as prednisone, other steroids, or anticancer drugs; drugs for the treatment of rheumatoid arthritis, Crohn s disease, or psoriasis; or have you had radiation treatments?   No   Have you had a seizure, or a brain or other nervous system problem?   No   During the past year, have you received a transfusion of blood or blood    products, or been given immune (gamma) globulin or antiviral drug?   No   For women: Are you pregnant or is there a chance you could become       pregnant during the next month?   No   Have you received any vaccinations in the past 4 weeks?   No     Immunization questionnaire answers were all negative.        Per orders of ADO, injection of pneumonia and flu given by Sallie Higuera CMA. Patient instructed to remain in clinic for 15 minutes afterwards, and to report any adverse reaction to me immediately.       Screening performed by Sallie Higuera CMA on 9/23/2020 at 10:46 AM.

## 2020-10-01 ENCOUNTER — MYC MEDICAL ADVICE (OUTPATIENT)
Dept: SLEEP MEDICINE | Facility: CLINIC | Age: 65
End: 2020-10-01

## 2020-10-05 VITALS — HEIGHT: 68 IN | WEIGHT: 195 LBS | BODY MASS INDEX: 29.55 KG/M2

## 2020-10-05 ASSESSMENT — MIFFLIN-ST. JEOR: SCORE: 1654.01

## 2020-10-05 NOTE — PROGRESS NOTES
"Quique Lyons is a 65 year old male who is being evaluated via a billable video visit.      The patient has been notified of following:     \"This video visit will be conducted via a call between you and your physician/provider. We have found that certain health care needs can be provided without the need for an in-person physical exam.  This service lets us provide the care you need with a video conversation.  If a prescription is necessary we can send it directly to your pharmacy.  If lab work is needed we can place an order for that and you can then stop by our lab to have the test done at a later time.    Video visits are billed at different rates depending on your insurance coverage.  Please reach out to your insurance provider with any questions.    If during the course of the call the physician/provider feels a video visit is not appropriate, you will not be charged for this service.\"    Patient has given verbal consent for Video visit? Yes  How would you like to obtain your AVS? MyChart  If you are dropped from the video visit, the video invite should be resent to: Text to cell phone: [phone]   Will anyone else be joining your video visit? No      Yann Lazaro MD on 10/5/2020 at 9:56 AM    Video-Visit Details    Type of service:  Video Visit    Video Start Time: 9:07 AM  Video End Time: 9:37 AM    Originating Location (pt. Location): Car    Distant Location (provider location):  [facility] SLEEP [facility]     Platform used for Video Visit: [email or web address]              Sleep Consultation:    Date on this visit: 10/6/2020    Quique Lyons is sent by Steven Adam for a sleep consultation regarding snoring, atrial fibrillation.    Primary Physician: [facility]     Chief Complaint   Patient presents with     Sleep Problem     Atrial fibrillation ref by Cardiologist to review for Sleep Apnea, wife says he has had sleep apnea for 20 years          Quique goes to bed at 9:00 PM " during the week. He gets up at 5:00 AM with an alarm. He falls asleep in <5 minutes.  Quique denies difficulty falling asleep.  He wakes up 2-4 times a week without difficulty falling back to sleep.  Quique wakes up to go to the bathroom.  On weekends, Quique goes to bed at 9:30 PM.  He gets up at 6:00 AM without an alarm.    Patient does watch TV in bed.     Quique does not do shift work.  He works day shifts.      Quique does snore snoring is loud. Patient does have a regular bed partner. He has had witnessed apneas in past. Wife states he jerks in bed'.. They never sleep separately.  Patient sleeps on his back and side. He denies no morning headaches and restless legs. He gets some leg creamps     Quique has occasional sleep talking and denies any sleep walking and dream enactment.    Quique denies difficulty breathing through his nose and reflux at night.       Patient's Jackson Sleepiness score 18/24 consistent with excessive daytime sleepiness. He says he gets 'lethargic' in the afternoons after a heavy lunch.  He denies fatigue.     Quique naps 1-2 times per week on weekends for  minutes. He takes frequent inadvertant naps if he is not stimulated.  He admits to struggling to stay awake when driving.  Patient was counseled on the importance of driving while alert, to pull over if drowsy, or nap before getting into the vehicle if sleepy.  He uses 4-5 cups/day of coffee. Last caffeine intake is usually before 10 AM.    He notes he has repetitive dream for a few days in a row. Then a few days later it changes to something else.     Recent Labs   Lab Test 09/12/20  0603 09/11/20  1155    139   POTASSIUM 4.4 4.1   CHLORIDE 113* 107   CO2 28 27   ANIONGAP 1* 5   * 134*   BUN 13 15   CR 0.92 0.89   WESLEY 8.4* 8.8       Allergies:    No Known Allergies    Medications:    Current Outpatient Medications   Medication Sig Dispense Refill     diltiazem ER COATED BEADS (CARDIZEM CD/CARTIA XT) 180 MG 24 hr capsule  Take 1 capsule (180 mg) by mouth daily 30 capsule 0       Problem List:  Patient Active Problem List    Diagnosis Date Noted     Atrial flutter (H) 10/06/2020     Priority: Medium     Admitted 9/11/20 with atrial flutter with rapid ventricular response. Converted with diltiazam       Tobacco abuse 04/06/2015     Priority: Medium     Snores 09/23/2020     Priority: Low     Advanced directives, counseling/discussion 01/13/2014     Priority: Low     Discussed Advance Directive planning with patient; however, patient declined at this time.          Past Medical/Surgical History:  Past Medical History:   Diagnosis Date     Atrial fibrillation with RVR (H) 9/11/2020     Chronic obstructive pulmonary disease with acute exacerbation (HCC) 12/1/2015     No past surgical history on file.    Social History:  Social History     Socioeconomic History     Marital status:      Spouse name: Not on file     Number of children: Not on file     Years of education: Not on file     Highest education level: Not on file   Occupational History     Occupation: [email or web address]   Social Needs     Financial resource strain: Not on file     Food insecurity     Worry: Not on file     Inability: Not on file     Transportation needs     Medical: Not on file     Non-medical: Not on file   Tobacco Use     Smoking status: Current Every Day Smoker     Smokeless tobacco: Never Used   Substance and Sexual Activity     Alcohol use: Yes     Frequency: 4 or more times a week     Drinks per session: 1 or 2     Drug use: No     Sexual activity: Yes     Partners: Female   Lifestyle     Physical activity     Days per week: Not on file     Minutes per session: Not on file     Stress: Not on file   Relationships     Social connections     Talks on phone: Not on file     Gets together: Not on file     Attends Gnosticist service: Not on file     Active member of club or organization: Not on file     Attends meetings of clubs or organizations: Not on file      "Relationship status: Not on file     Intimate partner violence     Fear of current or ex partner: Not on file     Emotionally abused: Not on file     Physically abused: Not on file     Forced sexual activity: Not on file   Other Topics Concern     Parent/sibling w/ CABG, MI or angioplasty before 65F 55M? Not Asked   Social History Narrative     Not on file       Family History:  Family History   Problem Relation Age of Onset     Dementia Father      Diabetes No family hx of      Coronary Artery Disease No family hx of      Colon Cancer No family hx of        Review of Systems:  A complete review of systems reviewed by me is negative with the exeption of what has been mentioned in the history of present illness.  CONSTITUTIONAL:  POSITIVE for  night sweats  EYES: NEGATIVE for changes in vision, blind spots, double vision.  ENT: NEGATIVE for ear pain, sore throat, sinus pain, post-nasal drip, runny nose, bloody nose  CARDIAC:  POSITIVE for  fluttering in chest  NEUROLOGIC: NEGATIVE headaches, weakness or numbness in the arms or legs.  DERMATOLOGIC:  POSITIVE for  new mole(s)  PULMONARY:  POSITIVE for  SOB with activity  GASTROINTESTINAL: NEGATIVE for nausea or vomitting, loose or watery stools, fat or grease in stools, constipation, abdominal pain, bowel movements black in color or blood noted.  GENITOURINARY: NEGATIVE for pain during urination, blood in urine, urinating more frequently than usual, irregular menstrual periods.  MUSCULOSKELETAL: NEGATIVE for muscle pain, bone or joint pain, swollen joints.  ENDOCRINE: NEGATIVE for increased thirst or urination, diabetes.  LYMPHATIC: NEGATIVE for swollen lymph nodes, lumps or bumps in the breasts or nipple discharge.    Physical Examination:  Vitals: Ht 1.727 m (5' 8\")   Wt 88.5 kg (195 lb)   BMI 29.65 kg/m    BMI= Body mass index is 29.65 kg/m .         Catron Total Score 10/5/2020   Total score - Catron 18       GENARO Total Score: 10 (10/05/20 0900)    SpO2 Readings " from Last 4 Encounters:   09/23/20 97%   09/12/20 94%   09/11/20 95%   03/12/20 96%       GENERAL APPEARANCE: alert and no distress  EYES: Eyes grossly normal to inspection  HENT: mouth without ulcers or lesions  NECK: not overly generous size  LUNGS: no shortness of breath , cough  NEURO: mentation intact, speech normal and cranial nerves 2-12 appear intact  PSYCH: affect normal/bright  Mallampati Class: 4      Impression/Plan:     Loud snoring, restless sleep/'jerking', excessive daytime sleepiness (ESS 18), night sweats, crowded oropharynx. Comrobid atrial flutter. Patient appears to be a good candidate for Home Sleep Test [facility] 4.    He will follow up with me in approximately two weeks after his sleep study has been competed to review the results and discuss plan of care.       Polysomnography reviewed.  Obstructive sleep apnea reviewed.  Complications of untreated sleep apnea were reviewed.    Yann Lazaro MD  I spent 30 minutes with patient     CC: Steven Adam

## 2020-10-05 NOTE — PATIENT INSTRUCTIONS
Your BMI is Body mass index is 29.65 kg/m .  Weight management is a personal decision.  If you are interested in exploring weight loss strategies, the following discussion covers the approaches that may be successful. Body mass index (BMI) is one way to tell whether you are at a healthy weight, overweight, or obese. It measures your weight in relation to your height.  A BMI of 18.5 to 24.9 is in the healthy range. A person with a BMI of 25 to 29.9 is considered overweight, and someone with a BMI of 30 or greater is considered obese. More than two-thirds of American adults are considered overweight or obese.  Being overweight or obese increases the risk for further weight gain. Excess weight may lead to heart disease and diabetes.  Creating and following plans for healthy eating and physical activity may help you improve your health.  Weight control is part of healthy lifestyle and includes exercise, emotional health, and healthy eating habits. Careful eating habits lifelong are the mainstay of weight control. Though there are significant health benefits from weight loss, long-term weight loss with diet alone may be very difficult to achieve- studies show long-term success with dietary management in less than 10% of people. Attaining a healthy weight may be especially difficult to achieve in those with severe obesity. In some cases, medications, devices and surgical management might be considered.  What can you do?  If you are overweight or obese and are interested in methods for weight loss, you should discuss this with your provider.     Consider reducing daily calorie intake by 500 calories.     Keep a food journal.     Avoiding skipping meals, consider cutting portions instead.    Diet combined with exercise helps maintain muscle while optimizing fat loss. Strength training is particularly important for building and maintaining muscle mass. Exercise helps reduce stress, increase energy, and improves fitness.  Increasing exercise without diet control, however, may not burn enough calories to loose weight.       Start walking three days a week 10-20 minutes at a time    Work towards walking thirty minutes five days a week     Eventually, increase the speed of your walking for 1-2 minutes at time    In addition, we recommend that you review healthy lifestyles and methods for weight loss available through the National Institutes of Health patient information sites:  http://win.niddk.nih.gov/publications/index.htm    And look into health and wellness programs that may be available through your health insurance provider, employer, local community center, or sarah club.    Weight management plan: Patient was referred to their PCP to discuss a diet and exercise plan.

## 2020-10-06 ENCOUNTER — VIRTUAL VISIT (OUTPATIENT)
Dept: SLEEP MEDICINE | Facility: CLINIC | Age: 65
End: 2020-10-06
Payer: COMMERCIAL

## 2020-10-06 DIAGNOSIS — R06.83 SNORES: Primary | ICD-10-CM

## 2020-10-06 DIAGNOSIS — G47.9 DISTURBANCE IN SLEEP BEHAVIOR: ICD-10-CM

## 2020-10-06 DIAGNOSIS — I48.91 ATRIAL FIBRILLATION WITH RVR (H): ICD-10-CM

## 2020-10-06 DIAGNOSIS — G47.10 ORGANIC HYPERSOMNIA: ICD-10-CM

## 2020-10-06 PROBLEM — I48.92 ATRIAL FLUTTER (H): Status: ACTIVE | Noted: 2020-10-06

## 2020-10-06 PROBLEM — I48.92 ATRIAL FLUTTER (H): Chronic | Status: ACTIVE | Noted: 2020-10-06

## 2020-10-06 PROCEDURE — 99203 OFFICE O/P NEW LOW 30 MIN: CPT | Mod: GT | Performed by: INTERNAL MEDICINE

## 2020-10-06 SDOH — ECONOMIC STABILITY: TRANSPORTATION INSECURITY
IN THE PAST 12 MONTHS, HAS THE LACK OF TRANSPORTATION KEPT YOU FROM MEDICAL APPOINTMENTS OR FROM GETTING MEDICATIONS?: NOT ASKED

## 2020-10-06 SDOH — ECONOMIC STABILITY: INCOME INSECURITY: HOW HARD IS IT FOR YOU TO PAY FOR THE VERY BASICS LIKE FOOD, HOUSING, MEDICAL CARE, AND HEATING?: NOT ASKED

## 2020-10-06 SDOH — ECONOMIC STABILITY: FOOD INSECURITY: WITHIN THE PAST 12 MONTHS, YOU WORRIED THAT YOUR FOOD WOULD RUN OUT BEFORE YOU GOT MONEY TO BUY MORE.: NOT ASKED

## 2020-10-06 SDOH — HEALTH STABILITY: MENTAL HEALTH: HOW OFTEN DO YOU HAVE 6 OR MORE DRINKS ON ONE OCCASION?: NOT ASKED

## 2020-10-06 SDOH — HEALTH STABILITY: MENTAL HEALTH: HOW OFTEN DO YOU HAVE A DRINK CONTAINING ALCOHOL?: 4 OR MORE TIMES A WEEK

## 2020-10-06 SDOH — ECONOMIC STABILITY: FOOD INSECURITY: WITHIN THE PAST 12 MONTHS, THE FOOD YOU BOUGHT JUST DIDN'T LAST AND YOU DIDN'T HAVE MONEY TO GET MORE.: NOT ASKED

## 2020-10-06 SDOH — ECONOMIC STABILITY: TRANSPORTATION INSECURITY
IN THE PAST 12 MONTHS, HAS LACK OF TRANSPORTATION KEPT YOU FROM MEETINGS, WORK, OR FROM GETTING THINGS NEEDED FOR DAILY LIVING?: NOT ASKED

## 2020-10-06 SDOH — HEALTH STABILITY: MENTAL HEALTH: HOW MANY STANDARD DRINKS CONTAINING ALCOHOL DO YOU HAVE ON A TYPICAL DAY?: 1 OR 2

## 2020-10-09 ENCOUNTER — VIRTUAL VISIT (OUTPATIENT)
Dept: CARDIOLOGY | Facility: CLINIC | Age: 65
End: 2020-10-09
Attending: INTERNAL MEDICINE
Payer: COMMERCIAL

## 2020-10-09 ENCOUNTER — TELEPHONE (OUTPATIENT)
Dept: CARDIOLOGY | Facility: CLINIC | Age: 65
End: 2020-10-09

## 2020-10-09 ENCOUNTER — HOSPITAL ENCOUNTER (OUTPATIENT)
Facility: CLINIC | Age: 65
End: 2020-10-09
Attending: INTERNAL MEDICINE | Admitting: INTERNAL MEDICINE
Payer: COMMERCIAL

## 2020-10-09 DIAGNOSIS — I48.92 ATRIAL FLUTTER WITH RAPID VENTRICULAR RESPONSE (H): Primary | ICD-10-CM

## 2020-10-09 DIAGNOSIS — R06.83 SNORING: ICD-10-CM

## 2020-10-09 PROCEDURE — 99213 OFFICE O/P EST LOW 20 MIN: CPT | Mod: GT | Performed by: NURSE PRACTITIONER

## 2020-10-09 RX ORDER — DILTIAZEM HYDROCHLORIDE 180 MG/1
180 CAPSULE, COATED, EXTENDED RELEASE ORAL DAILY
Qty: 30 CAPSULE | Refills: 11 | Status: SHIPPED | OUTPATIENT
Start: 2020-10-09 | End: 2020-10-13

## 2020-10-09 NOTE — PROGRESS NOTES
"Quique Lyons is a 65 year old male who is being evaluated via a billable video visit.      The patient has been notified of following:     \"This video visit will be conducted via a call between you and your physician/provider. We have found that certain health care needs can be provided without the need for an in-person physical exam.  This service lets us provide the care you need with a video conversation.  If a prescription is necessary we can send it directly to your pharmacy.  If lab work is needed we can place an order for that and you can then stop by our lab to have the test done at a later time.    Video visits are billed at different rates depending on your insurance coverage.  Please reach out to your insurance provider with any questions.    If during the course of the call the physician/provider feels a video visit is not appropriate, you will not be charged for this service.\"    Patient has given verbal consent for Video visit? Yes  How would you like to obtain your AVS? Mail a copy  If you are dropped from the video visit, the video invite should be resent to: Text to cell phone: [phone]  Will anyone else be joining your video visit? No      Ju Barker MA 10/9/2020 8:26 AM    Video-Visit Details    Type of service:  Video Visit    Video Start Time: 9:49 AM  Video End Time: 10:12 AM    Originating Location (pt. Location): Home    Distant Location (provider location):  [facility] HEART [facility]     Platform used for Video Visit: [facility]        Cardiology Clinic Progress Note  Quique Lyons MRN# [id number]   YOB: 1955 Age: 65 year old      Primary Cardiologist:   Dr. Seth           History of Presenting Illness:      Quique Lyons is a pleasant 65 year old patient with a past cardiac history significant for   1. Atrial flutter  Past medical history significant for current tobacco abuse, possible sleep apnea.  He is a " bello and is .    He was hospitalized on 9/11/2020 with dizziness and presyncope and found to be in atrial flutter with RVR.  EKG showed typical atrial flutter 2-1 conduction.  He converted back to sinus rhythm with IV diltiazem and was started on p.o. diltiazem.  Given his chadsVASC score of 0 there was no need for anticoagulation.  Echocardiogram showed low normal EF 50 to 55%, possible PFO, and no significant valvular disease.  He reported 5-6 episodes of heart racing over the prior year but nothing that lasted more than 1 to 2 minutes.  He was noted to have possible sleep apnea with his wife hearing loud snoring and noting that he stopped breathing at night.  In the future, would avoid beta-blocker due to active wheezing.  Dr. Felipe Andrews discussed medical therapy with diltiazem versus flutter ablation.  Given the severity of his symptoms ablation was recommended outpatient.    Pt presents today for hospital follow-up.  Since discharging home, he denies any further atrial flutter or heart racing.  After starting diltiazem he has noted that for an hour or 2 in the AM he has some mild lightheadedness.  He does not have a way to check blood pressures.  During that time he is able to complete all of his normal activities and do his work, climbing ladders, without any difficulty.  He prefers to stay on this dose of the medication at this time.  He is wondering about having more than 1 alcoholic beverage per day and I recommended he stick to only the one per day until he can have the flutter ablation and then discuss with EP.  He is interested in having the flutter ablation and he will call to get this scheduled.  He is also set up for a sleep study next week. Patient reports no chest pain, shortness of breath, PND, orthopnea, presyncope, syncope, edema, heart racing, or palpitations.      Current Cardiac Medications   Diltiazem  mg daily                   Assessment and Plan:       Plan  1. Call EP  heart nurses at [facility] to get set up for flutter ablation. They may need to schedule you with a provider to discuss the procedure first.       1. Typical atrial flutter    Hospitalized with typical atrial flutter RVR 9/11/2020 with conversion to sinus rhythm after IV diltiazem    Symptomatic with dizziness and presyncope    Low normal EF    Chads VASC score of 0    Given severity of symptoms flutter ablation was recommended    Continue diltiazem for rate control and no need for anticoagulation at this time         Thank you for allowing me to participate in this delightful patient's care.      This note was completed in part using Dragon voice recognition software. Although reviewed after completion, some word and grammatical errors may occur.    Pili Dong, APRN CNP, APRN, CNP           Data:   All laboratory data reviewed      HPI and Plan:   See dictation    Orders Placed This Encounter   Procedures     SLEEP EVALUATION & MANAGEMENT REFERRAL - ADULT -Other (Respond in commments) ([place] )     Follow-Up with Electrophysiologist       Orders Placed This Encounter   Medications     diltiazem ER COATED BEADS (CARDIZEM CD/CARTIA XT) 180 MG 24 hr capsule     Sig: Take 1 capsule (180 mg) by mouth daily     Dispense:  30 capsule     Refill:  11       Medications Discontinued During This Encounter   Medication Reason     diltiazem ER COATED BEADS (CARDIZEM CD/CARTIA XT) 180 MG 24 hr capsule Reorder         Encounter Diagnoses   Name Primary?     Atrial flutter with rapid ventricular response (H) Yes     Snoring        CURRENT MEDICATIONS:  Current Outpatient Medications   Medication Sig Dispense Refill     diltiazem ER COATED BEADS (CARDIZEM CD/CARTIA XT) 180 MG 24 hr capsule Take 1 capsule (180 mg) by mouth daily 30 capsule 11       ALLERGIES   No Known Allergies    PAST MEDICAL HISTORY:  Past Medical History:   Diagnosis Date     Atrial fibrillation with RVR (H) 9/11/2020     Chronic  obstructive pulmonary disease with acute exacerbation (HCC) 12/1/2015       PAST SURGICAL HISTORY:  No past surgical history on file.    FAMILY HISTORY:  Family History   Problem Relation Age of Onset     Dementia Father      Diabetes No family hx of      Coronary Artery Disease No family hx of      Colon Cancer No family hx of        SOCIAL HISTORY:  Social History     Socioeconomic History     Marital status:      Spouse name: Not on file     Number of children: Not on file     Years of education: Not on file     Highest education level: Not on file   Occupational History     Occupation: [employer]   Social Needs     Financial resource strain: Not on file     Food insecurity     Worry: Not on file     Inability: Not on file     Transportation needs     Medical: Not on file     Non-medical: Not on file   Tobacco Use     Smoking status: Current Every Day Smoker     Smokeless tobacco: Never Used   Substance and Sexual Activity     Alcohol use: Yes     Frequency: 4 or more times a week     Drinks per session: 1 or 2     Drug use: No     Sexual activity: Yes     Partners: Female   Lifestyle     Physical activity     Days per week: Not on file     Minutes per session: Not on file     Stress: Not on file   Relationships     Social connections     Talks on phone: Not on file     Gets together: Not on file     Attends Sabianism service: Not on file     Active member of club or organization: Not on file     Attends meetings of clubs or organizations: Not on file     Relationship status: Not on file     Intimate partner violence     Fear of current or ex partner: Not on file     Emotionally abused: Not on file     Physically abused: Not on file     Forced sexual activity: Not on file   Other Topics Concern     Parent/sibling w/ CABG, MI or angioplasty before 65F 55M? Not Asked   Social History Narrative     Not on file       Review of Systems:  Skin:  Negative       Eyes:  Positive for visual blurring symptoms started  2-3 weeks ago  ENT:  Negative      Respiratory:  Negative       Cardiovascular:    Positive for;fatigue;lightheadedness    Gastroenterology: Negative      Genitourinary:  Negative      Musculoskeletal:  Negative      Neurologic:  Positive for headaches    Psychiatric:  Negative      Heme/Lymph/Imm:  Negative      Endocrine:  Negative        Physical Exam:  Vitals: There were no vitals taken for this visit.    General Appearance:  no distress, normal body habitus, upright.  ENT/Mouth:  membranes moist, no nasal discharge or bleeding gums. Normal head shape, no apparent injury or laceration.  Eyes:  no scleral icterus, normal conjunctivae.  No observed jaundice.  Neck:  no apparent thyromegaly.   Chest/Lungs:  No breathing difficulty while speaking.  No audible wheezing.  Equal chest wall expansion.  No cough.  Cardiovascular:  No obviously elevated jugular venous pressure. No apparent pitting edema bilaterally  Abdomen:  no evidence of abdominal distention.   Extremities:  no cyanosis noted.  Skin:  no xanthelasma, normal skin color. No facial lacerations or other trauma.  Neurologic:  Normal arm motion bilateral, no tremors.  No apparent focal defect.  Psychiatric:  Alert and oriented x3, calm demeanor      Constitutional:           Skin:             Head:           Eyes:           Lymph:      ENT:           Neck:           Respiratory:            Cardiac:                                                           GI:           Extremities and Muscular Skeletal:                 Neurological:           Psych:           CC  Diana Gamboa, DO  [address] [place] [address]

## 2020-10-09 NOTE — PATIENT INSTRUCTIONS
Medication Changes:  None     Recommendations:  1. Call with questions     Follow-up:  1. Call EP heart nurses at [facility] to get set up for flutter ablation. They may need to schedule you with a provider to discuss the procedure first.  [phone].     Cardiology Scheduling~[phone]  Cardiology Clinic RNs~[phone] (Jolanta Nunez RN and eLxis Singletary RN)

## 2020-10-09 NOTE — LETTER
"10/9/2020    [facility]  [address] [facility] [address]    RE: Quique Lyons       Dear Colleague,    I had the pleasure of seeing Quique Lyons in the [facility].    Quique Lyons is a 65 year old male who is being evaluated via a billable video visit.      The patient has been notified of following:     \"This video visit will be conducted via a call between you and your physician/provider. We have found that certain health care needs can be provided without the need for an in-person physical exam.  This service lets us provide the care you need with a video conversation.  If a prescription is necessary we can send it directly to your pharmacy.  If lab work is needed we can place an order for that and you can then stop by our lab to have the test done at a later time.    Video visits are billed at different rates depending on your insurance coverage.  Please reach out to your insurance provider with any questions.    If during the course of the call the physician/provider feels a video visit is not appropriate, you will not be charged for this service.\"    Patient has given verbal consent for Video visit? Yes  How would you like to obtain your AVS? Mail a copy  If you are dropped from the video visit, the video invite should be resent to: Text to cell phone: [phone]  Will anyone else be joining your video visit? Flor Barker MA 10/9/2020 8:26 AM    Video-Visit Details    Type of service:  Video Visit    Video Start Time: 9:49 AM  Video End Time: 10:12 AM    Originating Location (pt. Location): Home    Distant Location (provider location):  [facility]     Platform used for Video Visit: [facility]        Cardiology Clinic Progress Note  Quique Lyons MRN# [id number]   YOB: 1955 Age: 65 year old      Primary Cardiologist:   Dr. Seth           History of Presenting " Illness:      Quique Lyons is a pleasant 65 year old patient with a past cardiac history significant for   1. Atrial flutter  Past medical history significant for current tobacco abuse, possible sleep apnea.  He is a monsalve and is .    He was hospitalized on 9/11/2020 with dizziness and presyncope and found to be in atrial flutter with RVR.  EKG showed typical atrial flutter 2-1 conduction.  He converted back to sinus rhythm with IV diltiazem and was started on p.o. diltiazem.  Given his chadsVASC score of 0 there was no need for anticoagulation.  Echocardiogram showed low normal EF 50 to 55%, possible PFO, and no significant valvular disease.  He reported 5-6 episodes of heart racing over the prior year but nothing that lasted more than 1 to 2 minutes.  He was noted to have possible sleep apnea with his wife hearing loud snoring and noting that he stopped breathing at night.  In the future, would avoid beta-blocker due to active wheezing.  Dr. Felipe Andrews discussed medical therapy with diltiazem versus flutter ablation.  Given the severity of his symptoms ablation was recommended outpatient.    Pt presents today for hospital follow-up.  Since discharging home, he denies any further atrial flutter or heart racing.  After starting diltiazem he has noted that for an hour or 2 in the AM he has some mild lightheadedness.  He does not have a way to check blood pressures.  During that time he is able to complete all of his normal activities and do his work, climbing ladders, without any difficulty.  He prefers to stay on this dose of the medication at this time.  He is wondering about having more than 1 alcoholic beverage per day and I recommended he stick to only the one per day until he can have the flutter ablation and then discuss with EP.  He is interested in having the flutter ablation and he will call to get this scheduled.  He is also set up for a sleep study next week. Patient reports no chest pain,  shortness of breath, PND, orthopnea, presyncope, syncope, edema, heart racing, or palpitations.      Current Cardiac Medications   Diltiazem  mg daily                   Assessment and Plan:       Plan  1. Call EP heart nurses at [facility] to get set up for flutter ablation. They may need to schedule you with a provider to discuss the procedure first.       1. Typical atrial flutter    Hospitalized with typical atrial flutter RVR 9/11/2020 with conversion to sinus rhythm after IV diltiazem    Symptomatic with dizziness and presyncope    Low normal EF    Chads VASC score of 0    Given severity of symptoms flutter ablation was recommended    Continue diltiazem for rate control and no need for anticoagulation at this time         Thank you for allowing me to participate in this delightful patient's care.      This note was completed in part using Dragon voice recognition software. Although reviewed after completion, some word and grammatical errors may occur.    Pili Dong, APRN CNP, APRN, CNP           Data:   All laboratory data reviewed      HPI and Plan:   See dictation    Orders Placed This Encounter   Procedures     SLEEP EVALUATION & MANAGEMENT REFERRAL - ADULT -Other (Respond in commments) ([place] )     Follow-Up with Electrophysiologist       Orders Placed This Encounter   Medications     diltiazem ER COATED BEADS (CARDIZEM CD/CARTIA XT) 180 MG 24 hr capsule     Sig: Take 1 capsule (180 mg) by mouth daily     Dispense:  30 capsule     Refill:  11       Medications Discontinued During This Encounter   Medication Reason     diltiazem ER COATED BEADS (CARDIZEM CD/CARTIA XT) 180 MG 24 hr capsule Reorder         Encounter Diagnoses   Name Primary?     Atrial flutter with rapid ventricular response (H) Yes     Snoring        CURRENT MEDICATIONS:  Current Outpatient Medications   Medication Sig Dispense Refill     diltiazem ER COATED BEADS (CARDIZEM CD/CARTIA XT) 180 MG 24 hr capsule Take  1 capsule (180 mg) by mouth daily 30 capsule 11       ALLERGIES   No Known Allergies    PAST MEDICAL HISTORY:  Past Medical History:   Diagnosis Date     Atrial fibrillation with RVR (H) 9/11/2020     Chronic obstructive pulmonary disease with acute exacerbation (HCC) 12/1/2015       PAST SURGICAL HISTORY:  No past surgical history on file.    FAMILY HISTORY:  Family History   Problem Relation Age of Onset     Dementia Father      Diabetes No family hx of      Coronary Artery Disease No family hx of      Colon Cancer No family hx of        SOCIAL HISTORY:  Social History     Socioeconomic History     Marital status:      Spouse name: Not on file     Number of children: Not on file     Years of education: Not on file     Highest education level: Not on file   Occupational History     Occupation: [employer]   Social Needs     Financial resource strain: Not on file     Food insecurity     Worry: Not on file     Inability: Not on file     Transportation needs     Medical: Not on file     Non-medical: Not on file   Tobacco Use     Smoking status: Current Every Day Smoker     Smokeless tobacco: Never Used   Substance and Sexual Activity     Alcohol use: Yes     Frequency: 4 or more times a week     Drinks per session: 1 or 2     Drug use: No     Sexual activity: Yes     Partners: Female   Lifestyle     Physical activity     Days per week: Not on file     Minutes per session: Not on file     Stress: Not on file   Relationships     Social connections     Talks on phone: Not on file     Gets together: Not on file     Attends Holiness service: Not on file     Active member of club or organization: Not on file     Attends meetings of clubs or organizations: Not on file     Relationship status: Not on file     Intimate partner violence     Fear of current or ex partner: Not on file     Emotionally abused: Not on file     Physically abused: Not on file     Forced sexual activity: Not on file   Other Topics Concern      Parent/sibling w/ CABG, MI or angioplasty before 65F 55M? Not Asked   Social History Narrative     Not on file       Review of Systems:  Skin:  Negative       Eyes:  Positive for visual blurring symptoms started 2-3 weeks ago  ENT:  Negative      Respiratory:  Negative       Cardiovascular:    Positive for;fatigue;lightheadedness    Gastroenterology: Negative      Genitourinary:  Negative      Musculoskeletal:  Negative      Neurologic:  Positive for headaches    Psychiatric:  Negative      Heme/Lymph/Imm:  Negative      Endocrine:  Negative        Physical Exam:  Vitals: There were no vitals taken for this visit.    General Appearance:  no distress, normal body habitus, upright.  ENT/Mouth:  membranes moist, no nasal discharge or bleeding gums. Normal head shape, no apparent injury or laceration.  Eyes:  no scleral icterus, normal conjunctivae.  No observed jaundice.  Neck:  no apparent thyromegaly.   Chest/Lungs:  No breathing difficulty while speaking.  No audible wheezing.  Equal chest wall expansion.  No cough.  Cardiovascular:  No obviously elevated jugular venous pressure. No apparent pitting edema bilaterally  Abdomen:  no evidence of abdominal distention.   Extremities:  no cyanosis noted.  Skin:  no xanthelasma, normal skin color. No facial lacerations or other trauma.  Neurologic:  Normal arm motion bilateral, no tremors.  No apparent focal defect.  Psychiatric:  Alert and oriented x3, calm demeanor      Constitutional:           Skin:             Head:           Eyes:           Lymph:      ENT:           Neck:           Respiratory:            Cardiac:                                                           GI:           Extremities and Muscular Skeletal:                 Neurological:           Psych:             Thank you for allowing me to participate in the care of your patient.    Sincerely,     CRISTO Boudreaux [facility]    cc:   Diana  Olga Gamboa, DO  [address] [place] [address]

## 2020-10-09 NOTE — TELEPHONE ENCOUNTER
Spoke to patient after discussing upcoming appt on 10/13.  Will place orders for Aflutter ablation with NANCY prior as patient is not on AC.  Explained to patient need for NANCY.  Also patient will need COVID testing prior to procedure.  Orders placed in epic.  Patient provided verbal understanding regarding above.  DIANA Lozoya

## 2020-10-09 NOTE — TELEPHONE ENCOUNTER
Patient called was seen by Pili Velazquez, MILLER today who is recommending aflutter ablation.  Patient calling wanting to pursue having the ablation.  Scheduled appt with Dr Seth on 10/13 to discuss further.  DIANA Lozoya

## 2020-10-13 ENCOUNTER — TELEPHONE (OUTPATIENT)
Dept: CARDIOLOGY | Facility: CLINIC | Age: 65
End: 2020-10-13

## 2020-10-13 ENCOUNTER — OFFICE VISIT (OUTPATIENT)
Dept: CARDIOLOGY | Facility: CLINIC | Age: 65
End: 2020-10-13
Payer: COMMERCIAL

## 2020-10-13 ENCOUNTER — OFFICE VISIT (OUTPATIENT)
Dept: SLEEP MEDICINE | Facility: CLINIC | Age: 65
End: 2020-10-13
Payer: COMMERCIAL

## 2020-10-13 VITALS
DIASTOLIC BLOOD PRESSURE: 80 MMHG | WEIGHT: 200 LBS | HEART RATE: 66 BPM | SYSTOLIC BLOOD PRESSURE: 174 MMHG | BODY MASS INDEX: 30.31 KG/M2 | HEIGHT: 68 IN

## 2020-10-13 DIAGNOSIS — I48.92 ATRIAL FLUTTER WITH RAPID VENTRICULAR RESPONSE (H): ICD-10-CM

## 2020-10-13 DIAGNOSIS — R09.02 HYPOXEMIA: ICD-10-CM

## 2020-10-13 DIAGNOSIS — I48.91 ATRIAL FIBRILLATION WITH RVR (H): ICD-10-CM

## 2020-10-13 DIAGNOSIS — G47.10 ORGANIC HYPERSOMNIA: ICD-10-CM

## 2020-10-13 DIAGNOSIS — G47.33 OSA (OBSTRUCTIVE SLEEP APNEA): Primary | ICD-10-CM

## 2020-10-13 DIAGNOSIS — I48.3 TYPICAL ATRIAL FLUTTER (H): Primary | ICD-10-CM

## 2020-10-13 DIAGNOSIS — G47.9 DISTURBANCE IN SLEEP BEHAVIOR: ICD-10-CM

## 2020-10-13 DIAGNOSIS — R06.83 SNORES: ICD-10-CM

## 2020-10-13 DIAGNOSIS — R06.83 SNORING: ICD-10-CM

## 2020-10-13 PROCEDURE — U0003 INFECTIOUS AGENT DETECTION BY NUCLEIC ACID (DNA OR RNA); SEVERE ACUTE RESPIRATORY SYNDROME CORONAVIRUS 2 (SARS-COV-2) (CORONAVIRUS DISEASE [COVID-19]), AMPLIFIED PROBE TECHNIQUE, MAKING USE OF HIGH THROUGHPUT TECHNOLOGIES AS DESCRIBED BY CMS-2020-01-R: HCPCS | Performed by: INTERNAL MEDICINE

## 2020-10-13 PROCEDURE — 93000 ELECTROCARDIOGRAM COMPLETE: CPT | Performed by: INTERNAL MEDICINE

## 2020-10-13 PROCEDURE — 99214 OFFICE O/P EST MOD 30 MIN: CPT | Performed by: INTERNAL MEDICINE

## 2020-10-13 PROCEDURE — G0399 HOME SLEEP TEST/TYPE 3 PORTA: HCPCS | Performed by: INTERNAL MEDICINE

## 2020-10-13 RX ORDER — LISINOPRIL 10 MG/1
10 TABLET ORAL DAILY
Qty: 30 TABLET | Refills: 11 | Status: ON HOLD | OUTPATIENT
Start: 2020-10-13 | End: 2020-10-16

## 2020-10-13 ASSESSMENT — MIFFLIN-ST. JEOR: SCORE: 1676.69

## 2020-10-13 NOTE — LETTER
10/13/2020    [facility]  [address] [facility] [address]    RE: Quique Lyons       Dear Colleague,    I had the pleasure of seeing Quique Lyons in the [facility].    HPI and Plan:   See dictation [id number]    Orders Placed This Encounter   Procedures     EKG 12-lead complete w/read - Clinics (future- to be scheduled)       Orders Placed This Encounter   Medications     lisinopril (ZESTRIL) 10 MG tablet     Sig: Take 1 tablet (10 mg) by mouth daily     Dispense:  30 tablet     Refill:  11       Medications Discontinued During This Encounter   Medication Reason     diltiazem ER COATED BEADS (CARDIZEM CD/CARTIA XT) 180 MG 24 hr capsule          Encounter Diagnosis   Name Primary?     Typical atrial flutter (H) Yes       CURRENT MEDICATIONS:  Current Outpatient Medications   Medication Sig Dispense Refill     lisinopril (ZESTRIL) 10 MG tablet Take 1 tablet (10 mg) by mouth daily 30 tablet 11       ALLERGIES   No Known Allergies    PAST MEDICAL HISTORY:  Past Medical History:   Diagnosis Date     Atrial fibrillation with RVR (H) 9/11/2020     Chronic obstructive pulmonary disease with acute exacerbation (HCC) 12/1/2015       PAST SURGICAL HISTORY:  No past surgical history on file.    FAMILY HISTORY:  Family History   Problem Relation Age of Onset     Dementia Father      Diabetes No family hx of      Coronary Artery Disease No family hx of      Colon Cancer No family hx of        SOCIAL HISTORY:  Social History     Socioeconomic History     Marital status:      Spouse name: None     Number of children: None     Years of education: None     Highest education level: None   Occupational History     Occupation: [employer]   Social Needs     Financial resource strain: None     Food insecurity     Worry: None     Inability: None     Transportation needs     Medical: None     Non-medical: None   Tobacco Use     Smoking status: Current Every Day Smoker     Packs/day: 1.00      Start date: 1973     Smokeless tobacco: Never Used     Tobacco comment: off and on quit for nine years and started again   Substance and Sexual Activity     Alcohol use: Yes     Frequency: 4 or more times a week     Drinks per session: 1 or 2     Drug use: No     Sexual activity: Yes     Partners: Female   Lifestyle     Physical activity     Days per week: None     Minutes per session: None     Stress: None   Relationships     Social connections     Talks on phone: None     Gets together: None     Attends Jewish service: None     Active member of club or organization: None     Attends meetings of clubs or organizations: None     Relationship status: None     Intimate partner violence     Fear of current or ex partner: None     Emotionally abused: None     Physically abused: None     Forced sexual activity: None   Other Topics Concern     Parent/sibling w/ CABG, MI or angioplasty before 65F 55M? Not Asked   Social History Narrative     None       Review of Systems:  Skin:  Negative       Eyes:  Positive for visual blurring symptoms started 2-3 weeks ago  ENT:  Negative      Respiratory:  Negative       Cardiovascular:    Positive for;fatigue;lightheadedness    Gastroenterology: Negative      Genitourinary:  Negative      Musculoskeletal:  Negative      Neurologic:  Positive for headaches    Psychiatric:  Negative      Heme/Lymph/Imm:  Negative      Endocrine:  Negative                          Thank you for allowing me to participate in the care of your patient.      Sincerely,     Angeli Seth MD     [facility]    cc:   No referring provider defined for this encounter.

## 2020-10-13 NOTE — PROGRESS NOTES
"Service Date: 10/13/2020      HISTORY OF PRESENT ILLNESS:    I had the pleasure of seeing Mr. Quique Lyons in follow-up of recent hospitalization with typical atrial flutter.      He is a very pleasant 65-year-old male with the following cardiac/medical issues:   A.  Typical atrial flutter lasting less than 24 hours on 09/11/2020.  Associated hypotension and overall severe symptoms.  Spontaneous conversion.   B.  Hypertension, recent diagnosis.   C.  Smoker.      Mr. Lyons was recently seen in our [facility] by Pili.  I had met him during his hospitalization in September when he was transferred to [facility] because of atrial flutter with severe symptoms.  He had spontaneous conversion.  Please see my consultation note from 09/12/2020 for details.  He was started on diltiazem.  We discussed the option of catheter ablation and he said he would consider this option.  He has now elected to proceed with this.  His echocardiogram during the hospital stay showed low normal LVEF of 50%-55% with no valvular abnormalities, possible PFO.      Quique has complained of headaches recently and \"heart pounding.\"  He has had some blurry vision.  He is wondering if these are side effects of diltiazem.  He did not take it today.  He has checked his blood pressure regularly over the past few days and he has been consistently hypertensive despite diltiazem  mg daily.      He continues to smoke.  He drinks only 1 beer per day.  He is a monsalve.        PHYSICAL EXAMINATION:   VITAL SIGNS:  Blood pressure 174/80, pulse 66 and regular, weight 90.7 kg, height 173 cm.   GENERAL:  He is a pleasant, healthy-appearing man in no distress.   NECK:  Supple without carotid bruits.   LUNGS:  Completely clear.   CARDIOVASCULAR:  Normal JVP, regular rhythm without murmur or rub.   ABDOMEN:  Soft, nontender.   EXTREMITIES:  No edema.      DIAGNOSTIC STUDIES:  His ECG today showed sinus rhythm.    " "    IMPRESSION:   1.  Single episode of typical atrial flutter with RVR and severe symptoms in 2020.  I had previously discussed with Bebo that recurrence of atrial flutter is expected in the future.  It is more of a question of when rather than if.  He has decided to proceed with catheter ablation.  I reminded him today how we do the procedure, what are the expectations, risks and benefits.  I quoted a risk of serious complication of approximately 1%.  Cure rate is about 95% with a single procedure.  He expressed understanding and agreed to proceed.  He does not need a NANCY, nor does he require anticoagulation since his [id number] score is 0.   2.  Hypertension.  I suspect that his headache and \"heart pounding\" is related to high blood pressure.  He does not like the diltiazem and has remained hypertensive despite it.  Today I will switch to lisinopril and order a BMP in a few days when he comes in for his outpatient procedure.  He may require a higher dose of ACE inhibitor or a combination of ACE/diuretic later on.      RECOMMENDATIONS:   1.  Proceed with atrial flutter ablation later this week.  He will have a COVID test today.   2.  Stop diltiazem.   3.  Start lisinopril 10 mg daily with BMP in a few days.     4.  He was counseled against tobacco use.      It was my pleasure seeing Bebo today.  Time spent in clinic was 25 minutes, more than 50% of the time was spent in discussion.         JAY HIGGINBOTHAM MD, [facility]             D: 10/13/2020   T: 10/13/2020   MT: opal      Name:     BEBO CHÁVEZ   MRN:      [id number]        Account:      [id number]   :      1955           Service Date: 10/13/2020      Document: [id number]      "

## 2020-10-13 NOTE — LETTER
"10/13/2020      [facility]  [address] [facility] [address]      RE: Quique Lyons       Dear Colleague,    I had the pleasure of seeing Quique Lyons in the [facility].    Service Date: 10/13/2020      HISTORY OF PRESENT ILLNESS:    I had the pleasure of seeing Mr. Quique Lyons in follow-up of recent hospitalization with typical atrial flutter.      He is a very pleasant 65-year-old male with the following cardiac/medical issues:   A.  Typical atrial flutter lasting less than 24 hours on 09/11/2020.  Associated hypotension and overall severe symptoms.  Spontaneous conversion.   B.  Hypertension, recent diagnosis.   C.  Smoker.      Mr. Lyons was recently seen in our [facility] by Pili.  I had met him during his hospitalization in September when he was transferred to [facility] because of atrial flutter with severe symptoms.  He had spontaneous conversion.  Please see my consultation note from 09/12/2020 for details.  He was started on diltiazem.  We discussed the option of catheter ablation and he said he would consider this option.  He has now elected to proceed with this.  His echocardiogram during the hospital stay showed low normal LVEF of 50%-55% with no valvular abnormalities, possible PFO.      Quique has complained of headaches recently and \"heart pounding.\"  He has had some blurry vision.  He is wondering if these are side effects of diltiazem.  He did not take it today.  He has checked his blood pressure regularly over the past few days and he has been consistently hypertensive despite diltiazem  mg daily.      He continues to smoke.  He drinks only 1 beer per day.  He is a monsalve.        PHYSICAL EXAMINATION:   VITAL SIGNS:  Blood pressure 174/80, pulse 66 and regular, weight 90.7 kg, height 173 cm.   GENERAL:  He is a pleasant, healthy-appearing man in no distress.   NECK:  Supple without carotid bruits. " "  LUNGS:  Completely clear.   CARDIOVASCULAR:  Normal JVP, regular rhythm without murmur or rub.   ABDOMEN:  Soft, nontender.   EXTREMITIES:  No edema.      DIAGNOSTIC STUDIES:  His ECG today showed sinus rhythm.        IMPRESSION:   1.  Single episode of typical atrial flutter with RVR and severe symptoms in 2020.  I had previously discussed with Bebo that recurrence of atrial flutter is expected in the future.  It is more of a question of when rather than if.  He has decided to proceed with catheter ablation.  I reminded him today how we do the procedure, what are the expectations, risks and benefits.  I quoted a risk of serious complication of approximately 1%.  Cure rate is about 95% with a single procedure.  He expressed understanding and agreed to proceed.  He does not need a NANCY, nor does he require anticoagulation since his [id number] score is 0.   2.  Hypertension.  I suspect that his headache and \"heart pounding\" is related to high blood pressure.  He does not like the diltiazem and has remained hypertensive despite it.  Today I will switch to lisinopril and order a BMP in a few days when he comes in for his outpatient procedure.  He may require a higher dose of ACE inhibitor or a combination of ACE/diuretic later on.      RECOMMENDATIONS:   1.  Proceed with atrial flutter ablation later this week.  He will have a COVID test today.   2.  Stop diltiazem.   3.  Start lisinopril 10 mg daily with BMP in a few days.     4.  He was counseled against tobacco use.      It was my pleasure seeing Bebo today.  Time spent in clinic was 25 minutes, more than 50% of the time was spent in discussion.         JAY HIGGINBOTHAM MD, [facility]             D: 10/13/2020   T: 10/13/2020   MT: opal      Name:     BEBO CHÁVEZ   MRN:      [id number]        Account:      [id number]   :      1955           Service Date: 10/13/2020      Document: [id number]            Outpatient Encounter Medications as of 10/13/2020 "   Medication Sig Dispense Refill     [DISCONTINUED] lisinopril (ZESTRIL) 10 MG tablet Take 1 tablet (10 mg) by mouth daily 30 tablet 11     [DISCONTINUED] diltiazem ER COATED BEADS (CARDIZEM CD/CARTIA XT) 180 MG 24 hr capsule Take 1 capsule (180 mg) by mouth daily 30 capsule 11     No facility-administered encounter medications on file as of 10/13/2020.        Again, thank you for allowing me to participate in the care of your patient.      Sincerely,    Angeli Seth MD     [facility] [place] [facility]

## 2020-10-13 NOTE — PROGRESS NOTES
Pt is completing a home sleep test. Pt was instructed on how to put on the Noxturnal T3 device and associated equipment before going to bed and given the opportunity to practice putting it on before leaving the sleep center. Pt was reminded to bring the home sleep test kit back to the center tomorrow, at agreed upon time for download and reporting.     Eduardo Robledo [facility], LRT  10/13/2020

## 2020-10-13 NOTE — TELEPHONE ENCOUNTER
Patient called yesterday early am reporting since starting diltiazem for his arrhythmias he has been experiencing headaches and more pronounced heartbeat.  BP readings 155/65 HR 75..  nformed patient being he has not yet been seen by Dr Seth we would defer to Pili to review as she saw him last week.  He indicated he would wait until his appt today with Dr Seth to review.  He was going to hold medication today and discuss.  DIANA Lozoya

## 2020-10-13 NOTE — PROGRESS NOTES
HPI and Plan:   See dictation [id number]    Orders Placed This Encounter   Procedures     EKG 12-lead complete w/read - Clinics (future- to be scheduled)       Orders Placed This Encounter   Medications     lisinopril (ZESTRIL) 10 MG tablet     Sig: Take 1 tablet (10 mg) by mouth daily     Dispense:  30 tablet     Refill:  11       Medications Discontinued During This Encounter   Medication Reason     diltiazem ER COATED BEADS (CARDIZEM CD/CARTIA XT) 180 MG 24 hr capsule          Encounter Diagnosis   Name Primary?     Typical atrial flutter (H) Yes       CURRENT MEDICATIONS:  Current Outpatient Medications   Medication Sig Dispense Refill     lisinopril (ZESTRIL) 10 MG tablet Take 1 tablet (10 mg) by mouth daily 30 tablet 11       ALLERGIES   No Known Allergies    PAST MEDICAL HISTORY:  Past Medical History:   Diagnosis Date     Atrial fibrillation with RVR (H) 9/11/2020     Chronic obstructive pulmonary disease with acute exacerbation (HCC) 12/1/2015       PAST SURGICAL HISTORY:  No past surgical history on file.    FAMILY HISTORY:  Family History   Problem Relation Age of Onset     Dementia Father      Diabetes No family hx of      Coronary Artery Disease No family hx of      Colon Cancer No family hx of        SOCIAL HISTORY:  Social History     Socioeconomic History     Marital status:      Spouse name: None     Number of children: None     Years of education: None     Highest education level: None   Occupational History     Occupation: [employer]   Social Needs     Financial resource strain: None     Food insecurity     Worry: None     Inability: None     Transportation needs     Medical: None     Non-medical: None   Tobacco Use     Smoking status: Current Every Day Smoker     Packs/day: 1.00     Start date: 1973     Smokeless tobacco: Never Used     Tobacco comment: off and on quit for nine years and started again   Substance and Sexual Activity     Alcohol use: Yes     Frequency: 4 or more times a week      Drinks per session: 1 or 2     Drug use: No     Sexual activity: Yes     Partners: Female   Lifestyle     Physical activity     Days per week: None     Minutes per session: None     Stress: None   Relationships     Social connections     Talks on phone: None     Gets together: None     Attends Yazdanism service: None     Active member of club or organization: None     Attends meetings of clubs or organizations: None     Relationship status: None     Intimate partner violence     Fear of current or ex partner: None     Emotionally abused: None     Physically abused: None     Forced sexual activity: None   Other Topics Concern     Parent/sibling w/ CABG, MI or angioplasty before 65F 55M? Not Asked   Social History Narrative     None       Review of Systems:  Skin:  Negative       Eyes:  Positive for visual blurring symptoms started 2-3 weeks ago  ENT:  Negative      Respiratory:  Negative       Cardiovascular:    Positive for;fatigue;lightheadedness    Gastroenterology: Negative      Genitourinary:  Negative      Musculoskeletal:  Negative      Neurologic:  Positive for headaches    Psychiatric:  Negative      Heme/Lymph/Imm:  Negative      Endocrine:  Negative

## 2020-10-14 ENCOUNTER — DOCUMENTATION ONLY (OUTPATIENT)
Dept: SLEEP MEDICINE | Facility: CLINIC | Age: 65
End: 2020-10-14
Payer: COMMERCIAL

## 2020-10-14 LAB
SARS-COV-2 RNA SPEC QL NAA+PROBE: NOT DETECTED
SPECIMEN SOURCE: NORMAL

## 2020-10-14 NOTE — PROGRESS NOTES
Pt returned HST device. It was downloaded and forwarded data to the clinical specialist for scoring.    Eduardo Robledo [facility], LRT  10/14/2020

## 2020-10-15 ENCOUNTER — TELEPHONE (OUTPATIENT)
Dept: CARDIOLOGY | Facility: CLINIC | Age: 65
End: 2020-10-15

## 2020-10-15 DIAGNOSIS — I48.3 TYPICAL ATRIAL FLUTTER (H): Primary | ICD-10-CM

## 2020-10-15 RX ORDER — LIDOCAINE 40 MG/G
CREAM TOPICAL
Status: CANCELLED | OUTPATIENT
Start: 2020-10-15

## 2020-10-15 RX ORDER — SODIUM CHLORIDE, SODIUM LACTATE, POTASSIUM CHLORIDE, CALCIUM CHLORIDE 600; 310; 30; 20 MG/100ML; MG/100ML; MG/100ML; MG/100ML
INJECTION, SOLUTION INTRAVENOUS CONTINUOUS
Status: CANCELLED | OUTPATIENT
Start: 2020-10-15

## 2020-10-15 NOTE — TELEPHONE ENCOUNTER
Spoke to patient to review instructions for aflutter  ablation scheduled for 10/16 .  Patient aware that he is to be NPO after midnight and may take sips of water with am medications. May have clear liquids until 8am as his procedure is not until 2pm.  Patient to arrive at 12 noon..  Patient aware that he not  will be staying overnight after procedure unless there are complications.  He has arranged for ride and someone to be with him for first 24 hours.  Wellness screen completed.  Wellness Screening Tool    Symptom Screening:    Do you have one of the following NEW symptoms:      Fever (subjective or >100.0)?  No    New cough? No    Shortness of breath? No    Chills? No    New loss of taste or smell? No    Generalized body aches? No    New persistent headache? No    New sore throat? No    Nausea, vomiting or diarrhea? No    Within the past 2 weeks, have you been exposed to someone with a known positive illness below?      COVID - 19 (known or suspected) No    Chicken pox?  No    Measles? No    Pertussis? No    Have you had a positive COVID-19 diagnostic test (nasal swab test) in the last 14 days or are you currently   on self-quarantine restrictions (i.e.travel restriction, exposure, etc?) No        Patient notified of visitor restriction: Yes  Patient informed to wear a mask: Yes    Patient's appointment status: Patient scheduled for aflutter ablation 10/16.  COVID test negative.  DIANA Lozoya

## 2020-10-16 ENCOUNTER — HOSPITAL ENCOUNTER (OUTPATIENT)
Facility: CLINIC | Age: 65
Discharge: HOME | End: 2020-10-16
Attending: INTERNAL MEDICINE | Admitting: INTERNAL MEDICINE
Payer: COMMERCIAL

## 2020-10-16 ENCOUNTER — SURGERY (OUTPATIENT)
Age: 65
End: 2020-10-16
Payer: COMMERCIAL

## 2020-10-16 VITALS
WEIGHT: 196.5 LBS | BODY MASS INDEX: 29.78 KG/M2 | OXYGEN SATURATION: 93 % | HEART RATE: 66 BPM | SYSTOLIC BLOOD PRESSURE: 136 MMHG | RESPIRATION RATE: 18 BRPM | HEIGHT: 68 IN | DIASTOLIC BLOOD PRESSURE: 74 MMHG

## 2020-10-16 DIAGNOSIS — I48.92 ATRIAL FLUTTER WITH RAPID VENTRICULAR RESPONSE (H): ICD-10-CM

## 2020-10-16 DIAGNOSIS — I48.3 TYPICAL ATRIAL FLUTTER (H): ICD-10-CM

## 2020-10-16 LAB
ANION GAP SERPL CALCULATED.3IONS-SCNC: 3 MMOL/L (ref 3–14)
BUN SERPL-MCNC: 16 MG/DL (ref 7–30)
CALCIUM SERPL-MCNC: 8.9 MG/DL (ref 8.5–10.1)
CHLORIDE SERPL-SCNC: 105 MMOL/L (ref 94–109)
CO2 SERPL-SCNC: 30 MMOL/L (ref 20–32)
CREAT SERPL-MCNC: 0.96 MG/DL (ref 0.66–1.25)
ERYTHROCYTE [DISTWIDTH] IN BLOOD BY AUTOMATED COUNT: 12.4 % (ref 10–15)
GFR SERPL CREATININE-BSD FRML MDRD: 84 ML/MIN/{1.73_M2}
GLUCOSE SERPL-MCNC: 90 MG/DL (ref 70–99)
HCT VFR BLD AUTO: 48.2 % (ref 40–53)
HGB BLD-MCNC: 16.7 G/DL (ref 13.3–17.7)
MCH RBC QN AUTO: 32.6 PG (ref 26.5–33)
MCHC RBC AUTO-ENTMCNC: 34.6 G/DL (ref 31.5–36.5)
MCV RBC AUTO: 94 FL (ref 78–100)
PLATELET # BLD AUTO: 232 10E9/L (ref 150–450)
POTASSIUM SERPL-SCNC: 4.3 MMOL/L (ref 3.4–5.3)
RBC # BLD AUTO: 5.13 10E12/L (ref 4.4–5.9)
SODIUM SERPL-SCNC: 138 MMOL/L (ref 133–144)
WBC # BLD AUTO: 9 10E9/L (ref 4–11)

## 2020-10-16 PROCEDURE — C1731 CATH, EP, 20 OR MORE ELEC: HCPCS | Performed by: INTERNAL MEDICINE

## 2020-10-16 PROCEDURE — C1887 CATHETER, GUIDING: HCPCS | Performed by: INTERNAL MEDICINE

## 2020-10-16 PROCEDURE — C1730 CATH, EP, 19 OR FEW ELECT: HCPCS | Performed by: INTERNAL MEDICINE

## 2020-10-16 PROCEDURE — C1732 CATH, EP, DIAG/ABL, 3D/VECT: HCPCS | Performed by: INTERNAL MEDICINE

## 2020-10-16 PROCEDURE — 272N000001 HC OR GENERAL SUPPLY STERILE: Performed by: INTERNAL MEDICINE

## 2020-10-16 PROCEDURE — 99152 MOD SED SAME PHYS/QHP 5/>YRS: CPT | Performed by: INTERNAL MEDICINE

## 2020-10-16 PROCEDURE — 250N000011 HC RX IP 250 OP 636: Performed by: INTERNAL MEDICINE

## 2020-10-16 PROCEDURE — 93653 COMPRE EP EVAL TX SVT: CPT | Performed by: INTERNAL MEDICINE

## 2020-10-16 PROCEDURE — C1894 INTRO/SHEATH, NON-LASER: HCPCS | Performed by: INTERNAL MEDICINE

## 2020-10-16 PROCEDURE — 85027 COMPLETE CBC AUTOMATED: CPT | Performed by: INTERNAL MEDICINE

## 2020-10-16 PROCEDURE — 93010 ELECTROCARDIOGRAM REPORT: CPT | Performed by: INTERNAL MEDICINE

## 2020-10-16 PROCEDURE — 93621 COMP EP EVL L PAC&REC C SINS: CPT | Mod: 26 | Performed by: INTERNAL MEDICINE

## 2020-10-16 PROCEDURE — 250N000009 HC RX 250: Performed by: INTERNAL MEDICINE

## 2020-10-16 PROCEDURE — 93621 COMP EP EVL L PAC&REC C SINS: CPT | Performed by: INTERNAL MEDICINE

## 2020-10-16 PROCEDURE — 93005 ELECTROCARDIOGRAM TRACING: CPT

## 2020-10-16 PROCEDURE — 93613 INTRACARDIAC EPHYS 3D MAPG: CPT | Performed by: INTERNAL MEDICINE

## 2020-10-16 PROCEDURE — 999N000071 HC STATISTIC HEART CATH LAB OR EP LAB

## 2020-10-16 PROCEDURE — C1733 CATH, EP, OTHR THAN COOL-TIP: HCPCS | Performed by: INTERNAL MEDICINE

## 2020-10-16 PROCEDURE — 99152 MOD SED SAME PHYS/QHP 5/>YRS: CPT | Mod: 59 | Performed by: INTERNAL MEDICINE

## 2020-10-16 PROCEDURE — 999N000054 HC STATISTIC EKG NON-CHARGEABLE

## 2020-10-16 PROCEDURE — 999N000184 HC STATISTIC TELEMETRY

## 2020-10-16 PROCEDURE — 99153 MOD SED SAME PHYS/QHP EA: CPT | Performed by: INTERNAL MEDICINE

## 2020-10-16 PROCEDURE — 80048 BASIC METABOLIC PNL TOTAL CA: CPT | Performed by: INTERNAL MEDICINE

## 2020-10-16 PROCEDURE — 258N000003 HC RX IP 258 OP 636: Performed by: INTERNAL MEDICINE

## 2020-10-16 PROCEDURE — 36415 COLL VENOUS BLD VENIPUNCTURE: CPT

## 2020-10-16 RX ORDER — SODIUM CHLORIDE, SODIUM LACTATE, POTASSIUM CHLORIDE, CALCIUM CHLORIDE 600; 310; 30; 20 MG/100ML; MG/100ML; MG/100ML; MG/100ML
INJECTION, SOLUTION INTRAVENOUS CONTINUOUS
Status: DISCONTINUED | OUTPATIENT
Start: 2020-10-16 | End: 2020-10-16 | Stop reason: HOSPADM

## 2020-10-16 RX ORDER — FENTANYL CITRATE 50 UG/ML
INJECTION, SOLUTION INTRAMUSCULAR; INTRAVENOUS
Status: DISCONTINUED | OUTPATIENT
Start: 2020-10-16 | End: 2020-10-16 | Stop reason: HOSPADM

## 2020-10-16 RX ORDER — ACETAMINOPHEN 325 MG/1
650 TABLET ORAL EVERY 4 HOURS PRN
Status: DISCONTINUED | OUTPATIENT
Start: 2020-10-16 | End: 2020-10-17 | Stop reason: HOSPADM

## 2020-10-16 RX ORDER — HEPARIN SODIUM 200 [USP'U]/100ML
100-600 INJECTION, SOLUTION INTRAVENOUS CONTINUOUS PRN
Status: DISCONTINUED | OUTPATIENT
Start: 2020-10-16 | End: 2020-10-16 | Stop reason: HOSPADM

## 2020-10-16 RX ORDER — HEPARIN SODIUM 200 [USP'U]/100ML
100-500 INJECTION, SOLUTION INTRAVENOUS CONTINUOUS PRN
Status: DISCONTINUED | OUTPATIENT
Start: 2020-10-16 | End: 2020-10-16 | Stop reason: HOSPADM

## 2020-10-16 RX ORDER — CEFAZOLIN SODIUM 1 G/3ML
1 INJECTION, POWDER, FOR SOLUTION INTRAMUSCULAR; INTRAVENOUS
Status: DISCONTINUED | OUTPATIENT
Start: 2020-10-16 | End: 2020-10-16 | Stop reason: HOSPADM

## 2020-10-16 RX ORDER — LIDOCAINE 40 MG/G
CREAM TOPICAL
Status: DISCONTINUED | OUTPATIENT
Start: 2020-10-16 | End: 2020-10-16 | Stop reason: HOSPADM

## 2020-10-16 RX ORDER — LISINOPRIL 20 MG/1
20 TABLET ORAL DAILY
Qty: 30 TABLET | Refills: 11 | Status: SHIPPED | OUTPATIENT
Start: 2020-10-16 | End: 2021-09-29 | Stop reason: SINTOL

## 2020-10-16 RX ORDER — KETOROLAC TROMETHAMINE 15 MG/ML
INJECTION, SOLUTION INTRAMUSCULAR; INTRAVENOUS
Status: DISCONTINUED | OUTPATIENT
Start: 2020-10-16 | End: 2020-10-16 | Stop reason: HOSPADM

## 2020-10-16 RX ADMIN — FENTANYL CITRATE 25 MCG: 50 INJECTION, SOLUTION INTRAMUSCULAR; INTRAVENOUS at 15:00

## 2020-10-16 RX ADMIN — MIDAZOLAM 0.5 MG: 1 INJECTION INTRAMUSCULAR; INTRAVENOUS at 14:27

## 2020-10-16 RX ADMIN — MIDAZOLAM 0.5 MG: 1 INJECTION INTRAMUSCULAR; INTRAVENOUS at 15:02

## 2020-10-16 RX ADMIN — FENTANYL CITRATE 25 MCG: 50 INJECTION, SOLUTION INTRAMUSCULAR; INTRAVENOUS at 14:32

## 2020-10-16 RX ADMIN — MIDAZOLAM 1 MG: 1 INJECTION INTRAMUSCULAR; INTRAVENOUS at 14:15

## 2020-10-16 RX ADMIN — MIDAZOLAM 0.5 MG: 1 INJECTION INTRAMUSCULAR; INTRAVENOUS at 15:00

## 2020-10-16 RX ADMIN — FENTANYL CITRATE 25 MCG: 50 INJECTION, SOLUTION INTRAMUSCULAR; INTRAVENOUS at 14:35

## 2020-10-16 RX ADMIN — KETOROLAC TROMETHAMINE 30 MG: 15 INJECTION, SOLUTION INTRAMUSCULAR; INTRAVENOUS at 15:03

## 2020-10-16 RX ADMIN — SODIUM CHLORIDE, POTASSIUM CHLORIDE, SODIUM LACTATE AND CALCIUM CHLORIDE: 600; 310; 30; 20 INJECTION, SOLUTION INTRAVENOUS at 13:04

## 2020-10-16 RX ADMIN — FENTANYL CITRATE 25 MCG: 50 INJECTION, SOLUTION INTRAMUSCULAR; INTRAVENOUS at 14:18

## 2020-10-16 RX ADMIN — MIDAZOLAM 0.5 MG: 1 INJECTION INTRAMUSCULAR; INTRAVENOUS at 14:51

## 2020-10-16 RX ADMIN — MIDAZOLAM 0.5 MG: 1 INJECTION INTRAMUSCULAR; INTRAVENOUS at 14:21

## 2020-10-16 RX ADMIN — FENTANYL CITRATE 50 MCG: 50 INJECTION, SOLUTION INTRAMUSCULAR; INTRAVENOUS at 14:14

## 2020-10-16 RX ADMIN — FENTANYL CITRATE 25 MCG: 50 INJECTION, SOLUTION INTRAMUSCULAR; INTRAVENOUS at 14:41

## 2020-10-16 RX ADMIN — FENTANYL CITRATE 25 MCG: 50 INJECTION, SOLUTION INTRAMUSCULAR; INTRAVENOUS at 15:16

## 2020-10-16 RX ADMIN — MIDAZOLAM 0.5 MG: 1 INJECTION INTRAMUSCULAR; INTRAVENOUS at 14:32

## 2020-10-16 RX ADMIN — FENTANYL CITRATE 25 MCG: 50 INJECTION, SOLUTION INTRAMUSCULAR; INTRAVENOUS at 14:27

## 2020-10-16 RX ADMIN — FENTANYL CITRATE 25 MCG: 50 INJECTION, SOLUTION INTRAMUSCULAR; INTRAVENOUS at 14:21

## 2020-10-16 RX ADMIN — FENTANYL CITRATE 50 MCG: 50 INJECTION, SOLUTION INTRAMUSCULAR; INTRAVENOUS at 14:51

## 2020-10-16 RX ADMIN — LIDOCAINE HYDROCHLORIDE 10 ML: 10 INJECTION, SOLUTION EPIDURAL; INFILTRATION; INTRACAUDAL; PERINEURAL at 14:19

## 2020-10-16 RX ADMIN — FENTANYL CITRATE 25 MCG: 50 INJECTION, SOLUTION INTRAMUSCULAR; INTRAVENOUS at 15:07

## 2020-10-16 RX ADMIN — MIDAZOLAM 0.5 MG: 1 INJECTION INTRAMUSCULAR; INTRAVENOUS at 14:35

## 2020-10-16 RX ADMIN — MIDAZOLAM 0.5 MG: 1 INJECTION INTRAMUSCULAR; INTRAVENOUS at 14:18

## 2020-10-16 ASSESSMENT — MIFFLIN-ST. JEOR: SCORE: 1660.82

## 2020-10-16 NOTE — PROGRESS NOTES
Dictated.    Pt in SR.  Clinical atrial flutter consistent with typical CTI circuit.  Empiric CTI ablation with bidirectional block.      Ketorolac 30 mg was given during procedure.    EBL 15 mL.    As the sheaths were being pulled, the patient went in AFib with RVR.  This lasted about 5 min and spontaneously converted.     Plan:  -Home later tonight if doing well  -No need for anticoagulation  -K, creat normal after starting lisinopril 10 mg earlier in the week; BP is still high; increase to 20 mg daily  -Follow-up in the EP clinic in 1-2 months.

## 2020-10-16 NOTE — PROGRESS NOTES
Care Suites Admission Nursing Note    Patient Information  Name: Quique Lyons  Age: 65 year old  Reason for admission: aflutter ablation  Care Suites arrival time: 1200    Visitor Information  Name: Selina  Informed of visitor restrictions: Yes  1 visitor allowed per patient   Visitor must screen negative for COVID symptoms   Visitor must wear a mask  Waiting rooms closed to visitors    Patient Admission/Assessment   Pre-procedure assessment complete: Yes  If abnormal assessment/labs, provider notified: N/A  NPO: Yes  Medications held per instructions/orders: N/A  Consent: obtained  If applicable, pregnancy test status: deferred  Patient oriented to room: Yes  Education/questions answered: Yes  Plan/other: proceed as planned discharge to home    Discharge Planning  Discharge name/phone number: Selina [phone]  Overnight post sedation caregiver: Selina  Discharge location: home    Delio García RN

## 2020-10-16 NOTE — PROGRESS NOTES
This HSAT was performed using a Noxturnal T3 device which recorded snore, sound, movement activity, body position, nasal pressure, oronasal thermal airflow, pulse, oximetry and both chest and abdominal respiratory effort. HSAT data was restricted to the time patient states they were in bed.     HSAT was scored using 1B 4% hypopnea rule.     HST AHI (Non-PAT): 4.7  Snoring was reported as variable.  Time with SpO2 below 89% was 7 minutes.   Overall signal quality was poor     Pt will follow up with sleep provider to determine appropriate therapy.   HST POST-STUDY QUESTIONNAIRE    1. What time did you go to bed?  9 pm  2. How long do you think it took to fall asleep?  30 min  3. What time did you wake up to start the day?  430 am  4. Did you get up during the night at all?  yes  5. If you woke up, do you remember approximately what time(s)? 1130, 100, 300, 430  6. Did you have any difficulty with the equipment?  Yes Little uncomfortable in nose  7. Did you us any type of treatment with this study?  None  8. Was the head of the bed elevated? No  9. Did you sleep in a recliner?  No  10. Did you stop using CPAP at least 3 days before this test?  NA  11. Any other information you'd like us to know? Each time that I woke up, I was dreaming beforehand.

## 2020-10-16 NOTE — DISCHARGE INSTRUCTIONS
A FLUTTER ABLATION DISCHARGE INSTRUCTIONS    After you go home:      Have an adult stay with you until tomorrow.    You may resume your normal diet.       For 24 hours - due to the sedation you received:    Relax and take it easy.    Do NOT make any important or legal decisions.    Do NOT drive or operate machines at home or at work.    Do NOT drink alcohol.    Care of Groin Puncture Site:      For the first 24 hrs - check the puncture site every 1-2 hours while awake.    For 2 days, when you cough, sneeze, laugh or move your bowels, hold your hand over the puncture site and press firmly.    Remove the dressing after 24 hours. If there is minor oozing, apply another bandaid and remove it after 12 hours.    It is normal to have a small bruise or pea size lump at the site.    You may shower tomorrow.  Do NOT take a bath, or use a hot tub or pool for at least 3 days. Do NOT scrub the site. Do not use lotion or powder near the puncture site.    Activity:            For 2 days:    No stooping or squatting    Do NOT do any heavy activity such as exercise, lifting, or straining.     No housework, yard work or any activity that make you sweat    Do NOT lift more than 10 pounds---NO TAKING OUT THE OUT THE DOCK---ONLY SUPERVISE    Bleeding:      If you start bleeding from the site in your groin, lie down flat and press firmly on the site for 10 minutes.     Once bleeding stops, lay flat for 2 hours.    Call [facility] nurse line as soon as you can.       Call 911 right away if you have heavy bleeding or bleeding that does not stop.      Medicines:      Take your medications, including blood thinners, unless your provider tells you not to.    If you have stopped any medicines, check with your provider about when to restart them.    If you have pain or shortness of breath, you may take Advil (ibuprofen) or Tylenol (acetaminophen).    Increase your Lisinopril to 20 mg daily, check your BP and report to  Dr Seth      Follow Up Appointments:      11/16/2020 with Pili Hurst, MILLER at 9:15am in [place]    You will receive a phone call Monday 10/19 from DIANA Lees in follow up to see how you are doing.    Call the clinic if:      You have increased pain or a large or growing hard lump around the site.    The site is red, swollen, hot or tender.    Blood or fluid is draining from the site.    You have chills or a fever greater than 101 F (38 C).    Your leg feels numb, cool or changes color.    Increased pain in the chest and/or groin.    Increased shortness of breath    Chest pain not relieved by Tylenol or Advil/Ibuprofen    New pain in the back or belly that you cannot control with Tylenol.    Recurrent irregular or fast heart rate (AFlutter) lasting over 2 hours.    Any questions or concerns.    Heart rhythms:    You may have some irregular heartbeats. These feel very strong. They may make you feel that the fast heart rhythm is going to start again.  Give it time. The irregular beats should occur less often.      [facility]:    [phone] ( 8am-4:30pm M-F)  Yoana Wheeler or Lexis    [phone] Option 2  On Call Cardiologist (7 days a week)

## 2020-10-16 NOTE — PROGRESS NOTES
PATIENT/VISITOR WELLNESS SCREENING    Step 1 Patient Screening    1. In the last month, have you been in contact with someone who was confirmed or suspected to have Coronavirus/COVID-19? No    2. Do you have the following symptoms?  Fever/Chills? No   Cough? No   Shortness of breath? No   New loss of taste or smell? No  Sore throat? No  Muscle or body aches? No  Headaches? No  Fatigue? No  Vomiting or diarrhea? No    Step 2 Visitor Screening    1. Name of Visitor (1 visitor per patient): Selina    2. In the last month, have you been in contact with someone who was confirmed or suspected to have Coronavirus/COVID-19? No    3. Do you have the following symptoms?  Fever/Chills? No   Cough? No   Shortness of breath? No   Skin rash? No   Loss of taste or smell? No  Sore throat? No  Runny or stuffy nose? No  Muscle or body aches? No  Headaches? No  Fatigue? No  Vomiting or diarrhea? No    If the visitor has positive symptoms, notify supervisor/manger  Per policy, the visitor will need to leave the facility     Step 3 Refer to logic grid below for actions    NO SYMPTOM(S)    ACTIONS:  1. Standard rooming process  2. Provider to assess per normal protocol  3. Implement precautions as needed and per guidelines     POSITIVE SYMPTOM(S)  If positive for ANY of the following symptoms: fever, cough, shortness of breath, rash    ACTION:  1. Continue to have the patient wear a mask   2. Room patient as soon as possible  3. Don appropriate PPE when entering room  4. Provider evaluation

## 2020-10-16 NOTE — PRE-PROCEDURE
GENERAL PRE-PROCEDURE:   Procedure:  EPS, ablation  Date/Time:  10/16/2020 2:08 PM    Written consent obtained?: Yes    Risks and benefits: Risks, benefits and alternatives were discussed    Consent given by:  Patient  Patient states understanding of procedure being performed: Yes    Patient's understanding of procedure matches consent: Yes    Procedure consent matches procedure scheduled: Yes    Expected level of sedation:  Moderate  Appropriately NPO:  Yes  ASA Class:  Class 1- healthy patient  Mallampati  :  Grade 2- soft palate, base of uvula, tonsillar pillars, and portion of posterior pharyngeal wall visible  Lungs:  Lungs clear with good breath sounds bilaterally  Heart:  Normal heart sounds and rate  History & Physical reviewed:  History and physical reviewed and no updates needed  Statement of review:  I have reviewed the lab findings, diagnostic data, medications, and the plan for sedation

## 2020-10-16 NOTE — PROGRESS NOTES
Care Suites Post Procedure Note    Patient Information  Name: Quique Lyons  Age: 65 year old    Post Procedure  Time patient returned to Care Suites: 1600  Concerns/abnormal assessment: VSS. No pain. Right groin site soft, no bleed or hematoma. Wife in room. Given sip water. Explained bedrest.  If abnormal assessment, provider notified: N/A  Plan/Other: per orders    Yesenia Grace RN     1750 pt ate meal. Drinking water. Reviewed avs discharge instructions with pt and wife and copy given. Report called to kumar childress. Will transfer pt to [facility]. Pt will discharge from there.

## 2020-10-17 NOTE — PROGRESS NOTES
[id number]- VSS. Pt denies pain. When the pt got off of bedrest at 2000, we went for a walk and he stated he was dizzy. Went back to his room and sat for a few minutes and went for another walk. The second walk he said his dizziness was better than before and was at about a 2/10. VSS. EKG showed NSR. [place] fellow was paged and he said since the dizziness was a 2/10 and is the pts only complaint, the pt will be ok to go home, as long as the pt felt comfortable. Around 2045 pt had minimal oozing at site. Pt stayed for another hr for monitoring. It did not continue to ooze. Wife was at bedside and the pt was discharged at about 2200.

## 2020-10-17 NOTE — PROCEDURES
"HOME SLEEP STUDY INTERPRETATION    Patient: Quique Lyons  MRN: [id number]  YOB: 1955  Study Date: 10/13/2020  Referring Provider: [facility]  Ordering Provider: Yann Lazaro MD     Indications for Home Study: Quique Lyons is a 65 year old male who presents with symptoms suggestive of obstructive sleep apnea.    Estimated body mass index is 29.88 kg/m  as calculated from the following:    Height as of 10/16/20: 1.727 m (5' 8\").    Weight as of 10/16/20: 89.1 kg (196 lb 8 oz).  Total score - [place]: 18 (10/5/2020  9:00 AM)  StopBang Total Score: 4 (10/6/2020  9:00 AM)    Data: A full night home sleep study was performed recording the standard physiologic parameters including body position, movement, sound, nasal pressure, thermal oral airflow, chest and abdominal movements with respiratory inductance plethysmography, and oxygen saturation by pulse oximetry. Pulse rate was estimated by oximetry recording. This study was considered adequate based on > 4 hours of quality oximetry and respiratory recording. As specified by the AASM Manual for the Scoring of Sleep and Associated events, version 2.3, Rule VIII.D 1B, 4% oxygen desaturation scoring for hypopneas is used as a standard of care on all home sleep apnea testing.    Analysis Time:  412 minutes    Respiration:   Sleep Associated Hypoxemia: mild sustained hypoxemia was present, but was ascribed to artifact. Baseline oxygen saturation was 91%.  Time with saturation less than or equal to 88% was 7 minutes. The lowest oxygen saturation was 85%.   Snoring: Snoring was present.  Respiratory events: The home study revealed a presence of 30 obstructive apneas and 0 mixed and central apneas. There were 2 hypopneas resulting in a combined apnea/hypopnea index [AHI] of 4.7 events per hour.  AHI was 5.9 per hour supine, n/a per hour prone, n/a per hour on left side, and 1.1 per hour on right side.   Pattern: Excluding events noted above, " respiratory rate and pattern was Normal.    Position: Percent of time spent: supine - 73%, prone - 0%, on left - 0%, on right - 26%.    Heart Rate: By pulse oximetry normal rate was noted.     Assessment:   No evidence of severe obstructive sleep apnea.  Sleep associated hypoxemia was present.  Additional possible sustained hypoxmia was present, attributed to artifact    Recommendations:  Polysomnogram with Transcutaneous CO2 Monitoring (TCM)   Weight management.    Diagnosis Code(s): Hypoxemia G47.36, Snoring R06.83    Yann Lazaro MD, October 17, 2020   Diplomate, American Board of Internal Medicine, Sleep Medicine

## 2020-10-19 ENCOUNTER — TELEPHONE (OUTPATIENT)
Dept: CARDIOLOGY | Facility: CLINIC | Age: 65
End: 2020-10-19

## 2020-10-19 NOTE — TELEPHONE ENCOUNTER
Spoke to patient in follow up to mohit ablaton done 1016.  Patient reports feeling good.  Denies CP, SOB, lightheadedness or dizziness.  [facility].  Reviewed discharge instructions and follow up appt with patient.    He had no further questions at this time.  He will call if he has issues.  DIANA Lozoya

## 2020-10-19 NOTE — PROGRESS NOTES
Patients sleep study did not show definitive obstructive sleep apnea, but low oxygen levels were seen.     Recommend Polysomnogram with Transcutaneous CO2 Monitoring (TCM) using 4% desaturation/Medicare/ AASM 1B scoring rules) for further evaluation of possible  obstructive sleep apnea.      Orders placed     Called x2 and left message

## 2020-10-20 DIAGNOSIS — I48.92 ATRIAL FLUTTER WITH RAPID VENTRICULAR RESPONSE (H): Primary | ICD-10-CM

## 2020-10-20 LAB
INTERPRETATION ECG - MUSE: NORMAL
INTERPRETATION ECG - MUSE: NORMAL

## 2020-11-15 ENCOUNTER — THERAPY VISIT (OUTPATIENT)
Dept: SLEEP MEDICINE | Facility: CLINIC | Age: 65
End: 2020-11-15
Payer: COMMERCIAL

## 2020-11-15 DIAGNOSIS — R09.02 HYPOXEMIA: ICD-10-CM

## 2020-11-15 DIAGNOSIS — I48.91 ATRIAL FIBRILLATION WITH RVR (H): ICD-10-CM

## 2020-11-15 DIAGNOSIS — G47.9 DISTURBANCE IN SLEEP BEHAVIOR: ICD-10-CM

## 2020-11-15 DIAGNOSIS — G47.33 OSA (OBSTRUCTIVE SLEEP APNEA): ICD-10-CM

## 2020-11-15 DIAGNOSIS — R06.83 SNORING: ICD-10-CM

## 2020-11-15 DIAGNOSIS — G47.10 ORGANIC HYPERSOMNIA: ICD-10-CM

## 2020-11-15 DIAGNOSIS — I48.92 ATRIAL FLUTTER WITH RAPID VENTRICULAR RESPONSE (H): ICD-10-CM

## 2020-11-15 PROCEDURE — 95810 POLYSOM 6/> YRS 4/> PARAM: CPT | Performed by: INTERNAL MEDICINE

## 2020-11-16 ENCOUNTER — MYC MEDICAL ADVICE (OUTPATIENT)
Dept: DERMATOLOGY | Facility: CLINIC | Age: 65
End: 2020-11-16

## 2020-11-16 ENCOUNTER — VIRTUAL VISIT (OUTPATIENT)
Dept: CARDIOLOGY | Facility: CLINIC | Age: 65
End: 2020-11-16
Attending: INTERNAL MEDICINE
Payer: COMMERCIAL

## 2020-11-16 ENCOUNTER — HOSPITAL ENCOUNTER (OUTPATIENT)
Dept: CARDIOLOGY | Facility: CLINIC | Age: 65
Discharge: HOME | End: 2020-11-16
Attending: PHYSICIAN ASSISTANT | Admitting: PHYSICIAN ASSISTANT
Payer: COMMERCIAL

## 2020-11-16 VITALS — SYSTOLIC BLOOD PRESSURE: 127 MMHG | BODY MASS INDEX: 28.89 KG/M2 | DIASTOLIC BLOOD PRESSURE: 77 MMHG | WEIGHT: 190 LBS

## 2020-11-16 DIAGNOSIS — I48.3 TYPICAL ATRIAL FLUTTER (H): Primary | ICD-10-CM

## 2020-11-16 DIAGNOSIS — I10 BENIGN ESSENTIAL HYPERTENSION: ICD-10-CM

## 2020-11-16 DIAGNOSIS — I48.92 ATRIAL FLUTTER WITH RAPID VENTRICULAR RESPONSE (H): ICD-10-CM

## 2020-11-16 PROBLEM — G47.33 OSA (OBSTRUCTIVE SLEEP APNEA): Chronic | Status: ACTIVE | Noted: 2020-11-16

## 2020-11-16 PROCEDURE — 99213 OFFICE O/P EST LOW 20 MIN: CPT | Mod: GT | Performed by: NURSE PRACTITIONER

## 2020-11-16 PROCEDURE — 93005 ELECTROCARDIOGRAM TRACING: CPT

## 2020-11-16 PROCEDURE — 93010 ELECTROCARDIOGRAM REPORT: CPT | Performed by: NURSE PRACTITIONER

## 2020-11-16 NOTE — PATIENT INSTRUCTIONS
Medication Changes:  None     Recommendations:  1. Call with questions   2. Call if having more lightheadedness or cough is not going away.    Follow-up:  See CATARINO Perea for cardiology follow up at [facility]: Feb 2021. Call in Dec. to schedule.     Cardiology Scheduling~[phone]  Cardiology Clinic RNs~[phone] (Jolanta Nunez RN and Lexis Singletary RN)

## 2020-11-16 NOTE — LETTER
"11/16/2020    [facility]  [address] [facility] [address]    RE: Quique Lyons       Dear Colleague,    I had the pleasure of seeing Quique Lyons in the [facility].    Quique Lyons is a 65 year old male who is being evaluated via a billable video visit.      The patient has been notified of following:     \"This video visit will be conducted via a call between you and your physician/provider. We have found that certain health care needs can be provided without the need for an in-person physical exam.  This service lets us provide the care you need with a video conversation.  If a prescription is necessary we can send it directly to your pharmacy.  If lab work is needed we can place an order for that and you can then stop by our lab to have the test done at a later time.    Video visits are billed at different rates depending on your insurance coverage.  Please reach out to your insurance provider with any questions.    If during the course of the call the physician/provider feels a video visit is not appropriate, you will not be charged for this service.\"    Patient has given verbal consent for Video visit? Yes  How would you like to obtain your AVS? MyChart  If you are dropped from the video visit, the video invite should be resent to: [phone]  Will anyone else be joining your video visit? No        Video-Visit Details    Type of service:  Video Visit    Video Start Time: 9:32 AM  Video End Time: 9:48 AM    Originating Location (pt. Location): Other Car    Distant Location (provider location):  [facility]     Platform used for Video Visit: CRISTO Rodriguez CNP          Cardiology Clinic Progress Note  Quique Lyons MRN# [id number]   YOB: 1955 Age: 65 year old      Primary Cardiologist:   Dr. Seth           History of Presenting Illness:      Quique Lyons is a pleasant 65 year " old patient with a past cardiac history significant for   1. Atrial flutter  2. Hypertension  Past medical history significant for current tobacco abuse, possible sleep apnea.  He is a monsalve and is .    He was hospitalized on 9/11/2020 with dizziness and presyncope and found to be in atrial flutter with RVR.  EKG showed typical atrial flutter 2-1 conduction.  He converted back to sinus rhythm with IV diltiazem and was started on p.o. diltiazem.  Given his chadsVASC score of 0 there was no need for anticoagulation.  Echocardiogram showed low normal EF 50 to 55%, possible PFO, and no significant valvular disease.  He reported 5-6 episodes of heart racing over the prior year but nothing that lasted more than 1 to 2 minutes.  He was noted to have possible sleep apnea with his wife hearing loud snoring and noting that he stopped breathing at night.  In the future, would avoid beta-blocker due to active wheezing. Given the severity of his symptoms ablation was recommended outpatient.    Pt presents today for ablation follow-up.  He underwent successful CVI flutter ablation on 10/16/2020.  When catheter was being removed he was found to have atrial fibrillation which spontaneously converted after 5 minutes.  EKG today showing sinus rhythm with ventricular rate 64 bpm and  ms.    After starting lisinopril, he has noted a cough but this has been slowly improving over the last month.  If this continues he will let us know and we can consider switching to losartan.  He just had his sleep study last night so do not have results of that yet.  He denies any further atrial flutter or symptoms of atrial fibrillation.  He denies any problems with his flutter access site.  He did notice a week and a half ago, while at work climbing ladders and exerting himself, that he had some lightheadedness.  This occurred on a day where the weather was warm and he was sweating.  He was not able to check blood pressure at that  time but home blood pressures have been slowly improving and most recently was 127/77.  We discussed the possibility of lightheadedness due to dehydration given the warm weather and sweating.  He will continue to monitor this. Patient reports no chest pain, shortness of breath, PND, orthopnea, presyncope, syncope, edema, heart racing, or palpitations.      Current Cardiac Medications   Lisinopril 20 mg daily                    Assessment and Plan:       Plan   Call if having more lightheadedness or cough is not going away.    Follow-up:  See CATARINO Perea for cardiology follow up at [facility]: Feb 2021.      1. Typical atrial flutter    Hospitalized with typical atrial flutter RVR 9/11/2020 with conversion to sinus rhythm after IV diltiazem    Symptomatic with dizziness and presyncope    S/p flutter ablation 10/2020    Low normal EF    Chads VASC score of 0    no need for anticoagulation at this time    Lightheadedness with diltiazem       2.  Hypertension    Controlled    Continue lisinopril       Thank you for allowing me to participate in this delightful patient's care.      This note was completed in part using Dragon voice recognition software. Although reviewed after completion, some word and grammatical errors may occur.    Pili Dong, CRISTO CNP, APRN, CNP           Data:   All laboratory data reviewed      HPI and Plan:   See dictation    Orders Placed This Encounter   Procedures     Follow-Up with Cardiac Advanced Practice Provider       No orders of the defined types were placed in this encounter.      There are no discontinued medications.      Encounter Diagnoses   Name Primary?     Typical atrial flutter (H) Yes     Benign essential hypertension        CURRENT MEDICATIONS:  Current Outpatient Medications   Medication Sig Dispense Refill     lisinopril (ZESTRIL) 20 MG tablet Take 1 tablet (20 mg) by mouth daily 30 tablet 11       ALLERGIES   No Known Allergies    PAST MEDICAL  HISTORY:  Past Medical History:   Diagnosis Date     Atrial fibrillation with RVR (H) 9/11/2020     Chronic obstructive pulmonary disease with acute exacerbation (HCC) 12/1/2015       PAST SURGICAL HISTORY:  Past Surgical History:   Procedure Laterality Date     EP ABLATION ATRIAL FLUTTER N/A 10/16/2020    Procedure: EP Ablation Atrial Flutter;  Surgeon: Angeli Seth MD;  Location:  HEART CARDIAC CATH LAB       FAMILY HISTORY:  Family History   Problem Relation Age of Onset     Dementia Father      Diabetes No family hx of      Coronary Artery Disease No family hx of      Colon Cancer No family hx of        SOCIAL HISTORY:  Social History     Socioeconomic History     Marital status:      Spouse name: None     Number of children: None     Years of education: None     Highest education level: None   Occupational History     Occupation: [employer]   Social Needs     Financial resource strain: None     Food insecurity     Worry: None     Inability: None     Transportation needs     Medical: None     Non-medical: None   Tobacco Use     Smoking status: Current Every Day Smoker     Packs/day: 1.00     Start date: 1973     Smokeless tobacco: Never Used     Tobacco comment: off and on quit for nine years and started again   Substance and Sexual Activity     Alcohol use: Yes     Frequency: 4 or more times a week     Drinks per session: 1 or 2     Comment: socially     Drug use: No     Sexual activity: Yes     Partners: Female   Lifestyle     Physical activity     Days per week: None     Minutes per session: None     Stress: None   Relationships     Social connections     Talks on phone: None     Gets together: None     Attends Evangelical service: None     Active member of club or organization: None     Attends meetings of clubs or organizations: None     Relationship status: None     Intimate partner violence     Fear of current or ex partner: None     Emotionally abused: None     Physically abused:  None     Forced sexual activity: None   Other Topics Concern     Parent/sibling w/ CABG, MI or angioplasty before 65F 55M? Not Asked   Social History Narrative     None       Review of Systems:  Skin:  Negative       Eyes:  Negative      ENT:  Negative      Respiratory:  Positive for dyspnea on exertion     Cardiovascular:    Positive for;lightheadedness;fatigue    Gastroenterology: Negative      Genitourinary:  Negative      Musculoskeletal:  Negative      Neurologic:  Negative      Psychiatric:  Negative      Heme/Lymph/Imm:  Positive for night sweats    Endocrine:  Negative        Physical Exam:  Vitals: /77   Wt 86.2 kg (190 lb)   BMI 28.89 kg/m      General Appearance:  no distress, normal body habitus, upright.  ENT/Mouth:  membranes moist, no nasal discharge or bleeding gums. Normal head shape, no apparent injury or laceration.  Eyes:  no scleral icterus, normal conjunctivae.  No observed jaundice.  Neck:  no apparent thyromegaly.   Chest/Lungs:  No breathing difficulty while speaking.  No audible wheezing.  Equal chest wall expansion.  No cough.  Cardiovascular:  No obviously elevated jugular venous pressure. No apparent pitting edema bilaterally  Abdomen:  no evidence of abdominal distention.   Extremities:  no cyanosis noted.  Skin:  no xanthelasma, normal skin color. No facial lacerations or other trauma.  Neurologic:  Normal arm motion bilateral, no tremors.  No apparent focal defect.  Psychiatric:  Alert and oriented x3, calm demeanor        Constitutional:           Skin:             Head:           Eyes:           Lymph:      ENT:           Neck:           Respiratory:            Cardiac:                                                           GI:           Extremities and Muscular Skeletal:                 Neurological:           Psych:           CC  [place]  [address] [place] [address]      Thank you for allowing me to participate in the care of your  patient.    Sincerely,     CRISTO Boudreaux CNP     [facility]

## 2020-11-16 NOTE — PROGRESS NOTES
"Quique Lyons is a 65 year old male who is being evaluated via a billable video visit.      The patient has been notified of following:     \"This video visit will be conducted via a call between you and your physician/provider. We have found that certain health care needs can be provided without the need for an in-person physical exam.  This service lets us provide the care you need with a video conversation.  If a prescription is necessary we can send it directly to your pharmacy.  If lab work is needed we can place an order for that and you can then stop by our lab to have the test done at a later time.    Video visits are billed at different rates depending on your insurance coverage.  Please reach out to your insurance provider with any questions.    If during the course of the call the physician/provider feels a video visit is not appropriate, you will not be charged for this service.\"    Patient has given verbal consent for Video visit? Yes  How would you like to obtain your AVS? MyChart  If you are dropped from the video visit, the video invite should be resent to: [phone]  Will anyone else be joining your video visit? No        Video-Visit Details    Type of service:  Video Visit    Video Start Time: 9:32 AM  Video End Time: 9:48 AM    Originating Location (pt. Location): Other Car    Distant Location (provider location):  [facility]     Platform used for Video Visit: CRISTO Rodriguez CNP          Cardiology Clinic Progress Note  Quique Lyons MRN# [id number]   YOB: 1955 Age: 65 year old      Primary Cardiologist:   Dr. Seth           History of Presenting Illness:      Quique Lyons is a pleasant 65 year old patient with a past cardiac history significant for   1. Atrial flutter  2. Hypertension  Past medical history significant for current tobacco abuse, possible sleep apnea.  He is a monsalve and is .    He was " hospitalized on 9/11/2020 with dizziness and presyncope and found to be in atrial flutter with RVR.  EKG showed typical atrial flutter 2-1 conduction.  He converted back to sinus rhythm with IV diltiazem and was started on p.o. diltiazem.  Given his chadsVASC score of 0 there was no need for anticoagulation.  Echocardiogram showed low normal EF 50 to 55%, possible PFO, and no significant valvular disease.  He reported 5-6 episodes of heart racing over the prior year but nothing that lasted more than 1 to 2 minutes.  He was noted to have possible sleep apnea with his wife hearing loud snoring and noting that he stopped breathing at night.  In the future, would avoid beta-blocker due to active wheezing. Given the severity of his symptoms ablation was recommended outpatient.    Pt presents today for ablation follow-up.  He underwent successful CVI flutter ablation on 10/16/2020.  When catheter was being removed he was found to have atrial fibrillation which spontaneously converted after 5 minutes.  EKG today showing sinus rhythm with ventricular rate 64 bpm and  ms.    After starting lisinopril, he has noted a cough but this has been slowly improving over the last month.  If this continues he will let us know and we can consider switching to losartan.  He just had his sleep study last night so do not have results of that yet.  He denies any further atrial flutter or symptoms of atrial fibrillation.  He denies any problems with his flutter access site.  He did notice a week and a half ago, while at work climbing ladders and exerting himself, that he had some lightheadedness.  This occurred on a day where the weather was warm and he was sweating.  He was not able to check blood pressure at that time but home blood pressures have been slowly improving and most recently was 127/77.  We discussed the possibility of lightheadedness due to dehydration given the warm weather and sweating.  He will continue to monitor  this. Patient reports no chest pain, shortness of breath, PND, orthopnea, presyncope, syncope, edema, heart racing, or palpitations.      Current Cardiac Medications   Lisinopril 20 mg daily                    Assessment and Plan:       Plan   Call if having more lightheadedness or cough is not going away.    Follow-up:  See CATARINO Perea for cardiology follow up at [facility]: Feb 2021.      1. Typical atrial flutter    Hospitalized with typical atrial flutter RVR 9/11/2020 with conversion to sinus rhythm after IV diltiazem    Symptomatic with dizziness and presyncope    S/p flutter ablation 10/2020    Low normal EF    Chads VASC score of 0    no need for anticoagulation at this time    Lightheadedness with diltiazem       2.  Hypertension    Controlled    Continue lisinopril       Thank you for allowing me to participate in this delightful patient's care.      This note was completed in part using Dragon voice recognition software. Although reviewed after completion, some word and grammatical errors may occur.    Pili Dong, APRN CNP, APRN, CNP           Data:   All laboratory data reviewed      HPI and Plan:   See dictation    Orders Placed This Encounter   Procedures     Follow-Up with Cardiac Advanced Practice Provider       No orders of the defined types were placed in this encounter.      There are no discontinued medications.      Encounter Diagnoses   Name Primary?     Typical atrial flutter (H) Yes     Benign essential hypertension        CURRENT MEDICATIONS:  Current Outpatient Medications   Medication Sig Dispense Refill     lisinopril (ZESTRIL) 20 MG tablet Take 1 tablet (20 mg) by mouth daily 30 tablet 11       ALLERGIES   No Known Allergies    PAST MEDICAL HISTORY:  Past Medical History:   Diagnosis Date     Atrial fibrillation with RVR (H) 9/11/2020     Chronic obstructive pulmonary disease with acute exacerbation (HCC) 12/1/2015       PAST SURGICAL HISTORY:  Past Surgical  History:   Procedure Laterality Date     EP ABLATION ATRIAL FLUTTER N/A 10/16/2020    Procedure: EP Ablation Atrial Flutter;  Surgeon: Angeli Seth MD;  Location:  HEART CARDIAC CATH LAB       FAMILY HISTORY:  Family History   Problem Relation Age of Onset     Dementia Father      Diabetes No family hx of      Coronary Artery Disease No family hx of      Colon Cancer No family hx of        SOCIAL HISTORY:  Social History     Socioeconomic History     Marital status:      Spouse name: None     Number of children: None     Years of education: None     Highest education level: None   Occupational History     Occupation: [employer]   Social Needs     Financial resource strain: None     Food insecurity     Worry: None     Inability: None     Transportation needs     Medical: None     Non-medical: None   Tobacco Use     Smoking status: Current Every Day Smoker     Packs/day: 1.00     Start date: 1973     Smokeless tobacco: Never Used     Tobacco comment: off and on quit for nine years and started again   Substance and Sexual Activity     Alcohol use: Yes     Frequency: 4 or more times a week     Drinks per session: 1 or 2     Comment: socially     Drug use: No     Sexual activity: Yes     Partners: Female   Lifestyle     Physical activity     Days per week: None     Minutes per session: None     Stress: None   Relationships     Social connections     Talks on phone: None     Gets together: None     Attends Oriental orthodox service: None     Active member of club or organization: None     Attends meetings of clubs or organizations: None     Relationship status: None     Intimate partner violence     Fear of current or ex partner: None     Emotionally abused: None     Physically abused: None     Forced sexual activity: None   Other Topics Concern     Parent/sibling w/ CABG, MI or angioplasty before 65F 55M? Not Asked   Social History Narrative     None       Review of Systems:  Skin:  Negative       Eyes:   Negative      ENT:  Negative      Respiratory:  Positive for dyspnea on exertion     Cardiovascular:    Positive for;lightheadedness;fatigue    Gastroenterology: Negative      Genitourinary:  Negative      Musculoskeletal:  Negative      Neurologic:  Negative      Psychiatric:  Negative      Heme/Lymph/Imm:  Positive for night sweats    Endocrine:  Negative        Physical Exam:  Vitals: /77   Wt 86.2 kg (190 lb)   BMI 28.89 kg/m      General Appearance:  no distress, normal body habitus, upright.  ENT/Mouth:  membranes moist, no nasal discharge or bleeding gums. Normal head shape, no apparent injury or laceration.  Eyes:  no scleral icterus, normal conjunctivae.  No observed jaundice.  Neck:  no apparent thyromegaly.   Chest/Lungs:  No breathing difficulty while speaking.  No audible wheezing.  Equal chest wall expansion.  No cough.  Cardiovascular:  No obviously elevated jugular venous pressure. No apparent pitting edema bilaterally  Abdomen:  no evidence of abdominal distention.   Extremities:  no cyanosis noted.  Skin:  no xanthelasma, normal skin color. No facial lacerations or other trauma.  Neurologic:  Normal arm motion bilateral, no tremors.  No apparent focal defect.  Psychiatric:  Alert and oriented x3, calm demeanor        Constitutional:           Skin:             Head:           Eyes:           Lymph:      ENT:           Neck:           Respiratory:            Cardiac:                                                           GI:           Extremities and Muscular Skeletal:                 Neurological:           Psych:           CC  Diana Gamboa, DO  [address] [place] [id number]

## 2020-11-17 LAB — SLPCOMP: NORMAL

## 2020-11-17 NOTE — PROCEDURES
" SLEEP STUDY INTERPRETATION  DIAGNOSTIC POLYSOMNOGRAPHY REPORT      Patient: BEBO CHÁVEZ  YOB: 1955  Study Date: 11/15/2020  MRN: [id number]  Referring Provider: -  Ordering Provider: Yann Lazaro MD      Indications for Polysomnography: The patient is a 65 year old Male who is 5' 8\" and weighs 195.0 lbs. His BMI is 29.9, [place] sleepiness scale 18 and neck circumference is 42 cm. A diagnostic polysomnogram was performed to evaluate for home sleep apnea study that did not show definitive obstructive sleep apnea, but low oxygen levels were seen.         Polysomnogram Data: A full night polysomnogram recorded the standard physiologic parameters including EEG, EOG, EMG, ECG, nasal and oral airflow. Respiratory parameters of chest and abdominal movements were recorded with respiratory inductance plethysmography. Oxygen saturation was recorded by pulse oximetry. Hypopnea scoring rule used: 1B 4%.      Sleep Architecture:   The total recording time of the polysomnogram was 446.9 minutes. The total sleep time was 410.0 minutes. Sleep latency was decreased at 4.5 minutes without the use of a sleep aid. REM latency was 54.0 minutes. Arousal index was 30.0 arousals per hour. Sleep efficiency was normal at 91.7%. Wake after sleep onset was 22.5 minutes. The patient spent 9.5% of total sleep time in Stage N1, 72.4% in Stage N2, 4.6% in Stage N3, and 13.4% in REM. Time in REM supine was 9.0 minutes.      Respiration:     Events ? The polysomnogram revealed a presence of 35 obstructive, 0 central, and 0 mixed apneas resulting in an apnea index of 5.1 events per hour. There were 25 obstructive hypopneas and 0 central hypopneas resulting in an obstructive hypopnea index of 3.7 and central hypopnea index of 0 events per hour. The combined apnea/hypopnea index was 8.8 events per hour (central apnea/hypopnea index was 0 events per hour). The REM AHI was 22.9 events per hour. The supine AHI was 11.0 events per hour. " The RERA index was 5.1 events per hour.  The RDI was 13.9 events per hour.    Snoring - was reported as moderate to loud and intermittent.    Respiratory rate and pattern - was notable for normal respiratory rate and pattern.    Sustained Sleep Associated Hypoventilation - Transcutaneous carbon dioxide monitoring was ordered but  not available , however significant hypoventilation was not suggested by oximetry    Sleep Associated Hypoxemia - (Greater than 5 minutes O2 sat at or below 88%) was not present. Baseline oxygen saturation was 93.1%. Lowest oxygen saturation was 83.1%. Time spent less than or equal to 88% was 1.2 minutes. Time spent less than or equal to 89% was 4.0 minutes.    Movement Activity:     Periodic Limb Activity - There were 507 PLMs during the entire study. The PLM index was 74.2 movements per hour. The PLM Arousal Index was 17.1 per hour.    REM EMG Activity - Excessive transient/sustained muscle activity was not present.    Nocturnal Behavior - Abnormal sleep related behaviors were not noted     Bruxism - None apparent.      Cardiac Summary:   The average pulse rate was 63.0 bpm. The minimum pulse rate was 47.9 bpm while the maximum pulse rate was 115.4 bpm.  One 10 beat run of SVT was noted      Assessment:     Mild obstructive sleep apnea, primarily supine-REM related when it is severe    Frequent periodic limb movements of sleep with moderate associated arousals    Recommendations:    Consider repeat polysomnography with full night titration of positive airway pressure therapy for the control of sleep disordered breathing.    Based on the presence of mild/moderate obstructive sleep apnea and symptoms or comorbidities, treatment could be empirically initiated with Auto?titrating PAP therapy with a range of 5 to 18 cmH2O. Recommend clinical follow up with sleep management team.    Patient may be a candidate for dental appliance through referral to Sleep Dentistry for the treatment of  obstructive sleep apnea and/or socially disruptive snoring.    Positinal therapy may be considered    If devices are not acceptable or effective, patient may benefit from evaluation of possible surgical options. If he is interested, would recommend referral to specialized ENT-Sleep provider.    Advice regarding the risks of drowsy driving.    Suggest optimizing sleep schedule and avoiding sleep deprivation.    Weight management (if BMI > 30).    Pharmacologic therapy should be used for management of restless legs syndrome only if present and clinically indicated and not based on the presence of periodic limb movements alone.    Diagnostic Codes:   Obstructive Sleep Apnea G47.33        _____________________________________   Electronically Signed By: Yann Lazaro MD 11/16/20           Range(%) Time in range (min)   0.0 - 89.0 4.0   0.0 - 88.0 1.2         Stage Min(mm Hg) Max(mm Hg)   Wake - -   NREM(1+2+3) - -   REM - -       Range(mmHg) Time in range (min)   55.0 - 100.0 -   Excluded data <20.0 & >65.0 447.5

## 2020-11-18 ENCOUNTER — VIRTUAL VISIT (OUTPATIENT)
Dept: DERMATOLOGY | Facility: CLINIC | Age: 65
End: 2020-11-18
Payer: COMMERCIAL

## 2020-11-18 ENCOUNTER — TELEPHONE (OUTPATIENT)
Dept: DERMATOLOGY | Facility: CLINIC | Age: 65
End: 2020-11-18

## 2020-11-18 DIAGNOSIS — D48.5 NEOPLASM OF UNCERTAIN BEHAVIOR OF SKIN: Primary | ICD-10-CM

## 2020-11-18 PROCEDURE — 99202 OFFICE O/P NEW SF 15 MIN: CPT | Mod: TEL | Performed by: DERMATOLOGY

## 2020-11-18 NOTE — TELEPHONE ENCOUNTER
Edilia Patel MD  [facility] Adult [place]             Please schedule patient for biopsy under the nose in clinic. WIll need cautery. Should be scheduled within 4 weeks.     LF        Phone call to patient. Scheduled at 9:45 on Tuesday, December 1st in clinic for biopsy under the nose.    Pili Roa LPN

## 2020-11-18 NOTE — PROGRESS NOTES
"Teledermatology Nurse Call for NEW Patients (not seen in last 3 years):     The patient was contacted by phone and we reviewed they have a visit in teledermatology upcoming with an MD or TYRON;  Importantly, \"a teledermatology visit may not be as thorough as an in-person visit, and the quality of the photograph and/or video sent may not be of the same quality as that taken by the dermatology clinic.\"     This video visit will be conducted via a call between you and your physician/provider via [employer]. We have found that certain health care needs can be provided without the need for an in-person physical exam.  This service lets us provide the care you need with a video conversation.  If a prescription is necessary we can send it directly to your pharmacy.  If lab work is needed we can place an order for that and you can then stop by our lab to have the test done at a later time.If during the course of the call the physician/provider feels a video visit is not appropriate, you will not be charged for this service.Visits are billed at different rates depending on your insurance coverage. Please reach out to your insurance provider with any questions.    The patient will also send photographs via [employer] for review. Instructions for photography are/were sent to the patient via [employer] messaging.       The patient verified the location of the photo/video visit to be [place].(The physician must be notified by nurse if the patient is not in the state of [place] during the encounter)    The patient denied skin pain, fever, mucosal symptoms(lesions, blisters, sores in the mouth, nose, eyes, or genitals) no IF PATIENT ENDORSES ANY OF THESE STOP AND PAGE ON CALL ATTENDING    Additional notes:  Patient summary of issue:growing, changing lesion  Location of problem on body:under nose left side  How long has area/symptoms been present:approimately 3 years  What makes it better?:no  What makes it worse?:no  Other symptoms include the " following:gets raw and bleeds when blowing nose, no other symptoms  Which medications have been tried, for how long, and did they make it better or worse (ex. Triamcinolone, used twice daily for 2 weeks, not improved):no  The patient has not seen a dermatologist.   The patient hasno past medical history of skin cancer  ROS: The patient is generally feeling well.     Brisa Clancy LPN    [facility] Dermatology Record:  Store and Forward and Telephone [phone]      Dermatology Problem List:  1. NUB, inferior to the left nare: Plan for biopsy in clinic.    Encounter Date: Nov 18, 2020    CC:   Chief Complaint   Patient presents with     Derm Problem     growing, changing lesion under nose       History of Present Illness:  Quique Lyons is a 65 year old male who presents for lesion of concern.    He notes the spot is underneath the nose. It has been present for about 3 years. It is steadily growing. It bleeds very easily. Patient has no personal history of skin cancer. He does work outside.    No other concerns addressed.      ROS: Patient is generally feeling well today    Physical Examination:  General: Well-appearing male, appropriately-developed individual.  Skin: Focused examination including face was performed.   - Pink papule with erosion and hemorrhagic crust about 1cm in size just inferior the left nare and possibly extending inside the nare a bit.    Labs:  None    Past Medical History:   Patient Active Problem List   Diagnosis     Advanced directives, counseling/discussion     Tobacco abuse     Atrial flutter (H)     CRICKET (obstructive sleep apnea)- mild (AHI 8)     Past Medical History:   Diagnosis Date     Atrial fibrillation with RVR (H) 9/11/2020     Chronic obstructive pulmonary disease with acute exacerbation (HCC) 12/1/2015     Past Surgical History:   Procedure Laterality Date     EP ABLATION ATRIAL FLUTTER N/A 10/16/2020    Procedure: EP Ablation Atrial Flutter;  Surgeon: Angeli Seth  MD Svetlana;  Location:  [facility]       Social History:  Patient reports that he has been smoking. He started smoking about 47 years ago. He has been smoking about 1.00 pack per day. He has never used smokeless tobacco. He reports current alcohol use. He reports that he does not use drugs. Worsk as a monsalve.     Family History:  Family History   Problem Relation Age of Onset     Dementia Father      Diabetes No family hx of      Coronary Artery Disease No family hx of      Colon Cancer No family hx of        Medications:  Current Outpatient Medications   Medication     lisinopril (ZESTRIL) 20 MG tablet     No current facility-administered medications for this visit.           No Known Allergies        Impression and Recommendations (Patient Counseled on the Following):  1. NUB, inferior to the left nare. Likely BCC. Discussed with patient and recommended biopsy. Discussed what biopsy would entail. Patient noted understanding. Will have nursing staff call to schedule.       Follow-up:   Within 4 weeks for biopsy.     Staff only    Edilia Patel MD    Department of Dermatology  [facility]: Phone: [phone], Fax:[phone]  [facility]: Phone: [phone], Fax: [phone]    _____________________________________________________________________________    Teledermatology information:  - Location of patient: [place]  - Patient presented as: provider referral  - Location of teledermatologist:  ([facility] )  - Reason teledermatology is appropriate:  of National Emergency Regarding Coronavirus disease (COVID 19) Outbreak  - Image quality and interpretability: acceptable  - Physician has received verbal consent for a Video/Photos Visit from the patient? YES  - In-person dermatology visit recommendation: yes - for biopsy  - Date of images:  11/17/20  - Service start time: 8:47  - Service end time: 8:54  - Date of report: 11/18/2020

## 2020-11-18 NOTE — LETTER
"    11/18/2020         RE: Quique Lyons  [id number] [address] [address]        Dear Colleague,    Thank you for referring your patient, Quique Lyons, to the [facility]. Please see a copy of my visit note below.    Teledermatology Nurse Call for NEW Patients (not seen in last 3 years):     The patient was contacted by phone and we reviewed they have a visit in teledermatology upcoming with an MD or PACLAUDETTE;  Importantly, \"a teledermatology visit may not be as thorough as an in-person visit, and the quality of the photograph and/or video sent may not be of the same quality as that taken by the dermatology clinic.\"     This video visit will be conducted via a call between you and your physician/provider via [employer]. We have found that certain health care needs can be provided without the need for an in-person physical exam.  This service lets us provide the care you need with a video conversation.  If a prescription is necessary we can send it directly to your pharmacy.  If lab work is needed we can place an order for that and you can then stop by our lab to have the test done at a later time.If during the course of the call the physician/provider feels a video visit is not appropriate, you will not be charged for this service.Visits are billed at different rates depending on your insurance coverage. Please reach out to your insurance provider with any questions.    The patient will also send photographs via [employer] for review. Instructions for photography are/were sent to the patient via [employer] messaging.       The patient verified the location of the photo/video visit to be [place].(The physician must be notified by nurse if the patient is not in the state of [place] during the encounter)    The patient denied skin pain, fever, mucosal symptoms(lesions, blisters, sores in the mouth, nose, eyes, or genitals) no IF PATIENT ENDORSES ANY OF THESE STOP AND PAGE ON CALL " ATTENDING    Additional notes:  Patient summary of issue:growing, changing lesion  Location of problem on body:under nose left side  How long has area/symptoms been present:approimately 3 years  What makes it better?:no  What makes it worse?:no  Other symptoms include the following:gets raw and bleeds when blowing nose, no other symptoms  Which medications have been tried, for how long, and did they make it better or worse (ex. Triamcinolone, used twice daily for 2 weeks, not improved):no  The patient has not seen a dermatologist.   The patient hasno past medical history of skin cancer  ROS: The patient is generally feeling well.     Brisa Clancy LPN    [facility] Dermatology Record:  Store and Forward and Telephone [phone]      Dermatology Problem List:  1. NUB, inferior to the left nare: Plan for biopsy in clinic.    Encounter Date: Nov 18, 2020    CC:   Chief Complaint   Patient presents with     Derm Problem     growing, changing lesion under nose       History of Present Illness:  Quique Lyons is a 65 year old male who presents for lesion of concern.    He notes the spot is underneath the nose. It has been present for about 3 years. It is steadily growing. It bleeds very easily. Patient has no personal history of skin cancer. He does work outside.    No other concerns addressed.      ROS: Patient is generally feeling well today    Physical Examination:  General: Well-appearing male, appropriately-developed individual.  Skin: Focused examination including face was performed.   - Pink papule with erosion and hemorrhagic crust about 1cm in size just inferior the left nare and possibly extending inside the nare a bit.    Labs:  None    Past Medical History:   Patient Active Problem List   Diagnosis     Advanced directives, counseling/discussion     Tobacco abuse     Atrial flutter (H)     CRICKET (obstructive sleep apnea)- mild (AHI 8)     Past Medical History:   Diagnosis Date     Atrial fibrillation with  RVR (H) 9/11/2020     Chronic obstructive pulmonary disease with acute exacerbation (HCC) 12/1/2015     Past Surgical History:   Procedure Laterality Date     EP ABLATION ATRIAL FLUTTER N/A 10/16/2020    Procedure: EP Ablation Atrial Flutter;  Surgeon: Angeli Seth MD;  Location:  [facility]ARD[facility] CATH LAB       Social History:  Patient reports that he has been smoking. He started smoking about 47 years ago. He has been smoking about 1.00 pack per day. He has never used smokeless tobacco. He reports current alcohol use. He reports that he does not use drugs. Worsk as a monsalve.     Family History:  Family History   Problem Relation Age of Onset     Dementia Father      Diabetes No family hx of      Coronary Artery Disease No family hx of      Colon Cancer No family hx of        Medications:  Current Outpatient Medications   Medication     lisinopril (ZESTRIL) 20 MG tablet     No current facility-administered medications for this visit.           No Known Allergies        Impression and Recommendations (Patient Counseled on the Following):  1. NUB, inferior to the left nare. Likely BCC. Discussed with patient and recommended biopsy. Discussed what biopsy would entail. Patient noted understanding. Will have nursing staff call to schedule.       Follow-up:   Within 4 weeks for biopsy.     Staff only    Edilia Patel MD    Department of Dermatology  [facility] [place] [facility]: Phone: [phone], Fax:[phone]  [facility]: Phone: [phone], Fax: [phone]    _____________________________________________________________________________    Teledermatology information:  - Location of patient: [place]  - Patient presented as: provider referral  - Location of teledermatologist:  ([facility] )  - Reason teledermatology is appropriate:  of National Emergency  Regarding Coronavirus disease (COVID 19) Outbreak  - Image quality and interpretability: acceptable  - Physician has received verbal consent for a Video/Photos Visit from the patient? YES  - In-person dermatology visit recommendation: yes - for biopsy  - Date of images: 11/17/20  - Service start time: 8:47  - Service end time: 8:54  - Date of report: 11/18/2020       Again, thank you for allowing me to participate in the care of your patient.        Sincerely,        Edilia Patel MD

## 2020-11-18 NOTE — Clinical Note
Please schedule patient for biopsy under the nose in clinic. WIll need cautery. Should be scheduled within 4 weeks.    LF

## 2020-11-18 NOTE — PATIENT INSTRUCTIONS
[facility] Dermatology Visit    Thank you for allowing us to participate in your care. Your findings, instructions and follow-up plan are as follows:    Suspected basal cell carcinoma (BCC) - will call you to set up biopsy.          When should I call my doctor?    If you are worsening or not improving, please, contact us or seek urgent care as noted below.     Who should I call with questions (adults)?    [facility] (adult and pediatric): [phone]     [facility] (adult): [phone]    For urgent needs outside of business hours call the [facility] at [phone] and ask for the dermatology resident on call    If this is a medical emergency and you are unable to reach an ER, Call [phone]      Who should I call with questions (pediatric)?  [facility]- Pediatric Dermatology  Dr. Harriet Ridley, Dr. Marcelle Foote, Dr. Gilma Zavaleta, Pam[facility] [place]  Dr. Lulu Sainz, Dr. Azeb Vazquez & Dr. Vladimir Kowalski  Non Urgent  Nurse Triage Line; [phone]- Melody and Chayo RN Care Coordinators   Shauna (/Complex ) [phone]    If you need a prescription refill, please contact your pharmacy. Refills are approved or denied by our Physicians during normal business hours, Monday through Fridays  Per office policy, refills will not be granted if you have not been seen within the past year (or sooner depending on your child's condition)    Scheduling Information:  Pediatric Appointment Scheduling and Call Center [phone]  Radiology Scheduling- [phone]  Sedation Unit Scheduling- [phone]  [facility] Scheduling- General [phone]; Pediatric Dermatology [phone]  Main  Services: [phone]  Vietnamese: [phone]  Iraqi: [phone]  Hmong/Faizan/German: [phone]  Preadmission Nursing Department Fax Number: [phone] (Fax all pre-operative  paperwork to this number)    For urgent matters arising during evenings, weekends, or holidays that cannot wait for normal business hours please call [phone] and ask for the Dermatology Resident On-Call to be paged.

## 2020-12-01 ENCOUNTER — OFFICE VISIT (OUTPATIENT)
Dept: DERMATOLOGY | Facility: CLINIC | Age: 65
End: 2020-12-01
Payer: COMMERCIAL

## 2020-12-01 DIAGNOSIS — D48.5 NEOPLASM OF UNCERTAIN BEHAVIOR OF SKIN: Primary | ICD-10-CM

## 2020-12-01 PROCEDURE — 11102 TANGNTL BX SKIN SINGLE LES: CPT | Performed by: DERMATOLOGY

## 2020-12-01 PROCEDURE — 88305 TISSUE EXAM BY PATHOLOGIST: CPT | Performed by: DERMATOLOGY

## 2020-12-01 ASSESSMENT — PAIN SCALES - GENERAL: PAINLEVEL: NO PAIN (0)

## 2020-12-01 NOTE — PROGRESS NOTES
The following medication was given:     MEDICATION:  Lidocaine with epinephrine 1% [id number]  ROUTE: SQ  SITE: see procedure note  DOSE: 0.5cc  LOT #: -[id number]  : Ross  EXPIRATION DATE: 02/2021  NDC#: 1164-1189-11   Was there drug waste? 1.5cc  Multi-dose vial: Yes    Pili Roa LPN  December 1, 2020

## 2020-12-01 NOTE — PROGRESS NOTES
[facility] Dermatology Note    Dermatology Problem List:  1. NUB, inferior to the left nare, s/p biopsy 12/1/20    Encounter Date: Dec 1, 2020    CC:  Chief Complaint   Patient presents with     Biopsy     Biopsy - spot under nose x3 years       History of Present Illness:  Mr. Quique Lyons is a 65 year old male who presents for biopsy of concerning skin lesion.    He was seen for virtual visit on 11/18/20 when plan was made to biopsy the spot underneath the left nare.    Today, he reports no change to the spot. No new concerns today.    No other concerns addressed today.    Past Medical History:   Patient Active Problem List   Diagnosis     Advanced directives, counseling/discussion     Tobacco abuse     Atrial flutter (H)     CRICKET (obstructive sleep apnea)- mild (AHI 8)     Past Medical History:   Diagnosis Date     Atrial fibrillation with RVR (H) 9/11/2020     Chronic obstructive pulmonary disease with acute exacerbation (HCC) 12/1/2015     Past Surgical History:   Procedure Laterality Date     EP ABLATION ATRIAL FLUTTER N/A 10/16/2020    Procedure: EP Ablation Atrial Flutter;  Surgeon: Angeli Seth MD;  Location: [facility] CARDIAC CATH LAB       Social History:  Patient reports that he has been smoking. He started smoking about 47 years ago. He has been smoking about 1.00 pack per day. He has never used smokeless tobacco. He reports current alcohol use. He reports that he does not use drugs. Works as a monsalve.     Family History:  Family History   Problem Relation Age of Onset     Dementia Father      Diabetes No family hx of      Coronary Artery Disease No family hx of      Colon Cancer No family hx of        Medications:  Current Outpatient Medications   Medication Sig Dispense Refill     lisinopril (ZESTRIL) 20 MG tablet Take 1 tablet (20 mg) by mouth daily 30 tablet 11       No Known Allergies    Review of Systems:  -Constitutional: Patient is otherwise feeling well,  in usual state of health.   -Skin: As above in HPI. No additional skin concerns.    Physical exam:  Vitals: There were no vitals taken for this visit.  GEN: This is a well developed, well-nourished male in no acute distress, in a pleasant mood.    SKIN: Focused examination of the nose was performed.  - Lambert Type II  - Pink pearly papule with arborizing vessels that starts just underneath the left nare and extends a few mm into the left nare. There is a satelite 1mm blue macule in the left alar crease that has blue gray ovid nests of pigment.  - No other lesions of concern on areas examined.     Impression/Plan:    1. NUB, inferior to the left nare, concerning for BCC with a satelite BCC in the left nasal alar crease:  - Shave biopsy:  After discussion of benefits and risks including but not limited to bleeding/bruising, pain/swelling, infection, scar, incomplete removal, nerve damage/numbness, recurrence, and non-diagnostic biopsy, written consent, verbal consent and photographs were obtained. Time-out was performed. The area was cleaned with isopropyl alcohol. 0.5ml of 1% lidocaine with 1:100,000 epinephrine was injected to obtain adequate anesthesia. A shave biopsy was performed. Hemostasis was achieved with aluminium chloride and electrocautery. Vaseline and a sterile dressing were applied. The patient tolerated the procedure and no complications were noted. The patient was provided with verbal and written post care instructions.  - Will call with results when available.    Follow-up TBD by biopsy results.    Staff Involved:  Staff and scribe    Scribe Disclosure  I, Pili Roa LPN, am serving as a scribe to document services personally performed by Dr. Edilia Patel MD, based on data collection and the provider's statements to me.     Provider Disclosure:   The documentation recorded by the scribe accurately reflects the services I personally performed and the decisions made by me.    Edilia  MD Amanda    Department of Dermatology  [facility]: Phone: [phone], Fax:[phone]  [facility]: Phone: [phone], Fax: [phone]

## 2020-12-01 NOTE — PATIENT INSTRUCTIONS

## 2020-12-01 NOTE — NURSING NOTE
Quique Lyons's goals for this visit include:   Chief Complaint   Patient presents with     Biopsy     Biopsy - spot under nose x3 years     He requests these members of his care team be copied on today's visit information: No    PCP: Rosemarie Payne    Referring Provider:  No referring provider defined for this encounter.    There were no vitals taken for this visit.    Do you need any medication refills at today's visit? No    Pili Roa LPN

## 2020-12-01 NOTE — LETTER
12/1/2020         RE: Quique Lyons  [address] [address]        Dear Colleague,    Thank you for referring your patient, Quique Lyons, to the [facility]. Please see a copy of my visit note below.    [facility] Dermatology Note    Dermatology Problem List:  1. NUB, inferior to the left nare, s/p biopsy 12/1/20    Encounter Date: Dec 1, 2020    CC:  Chief Complaint   Patient presents with     Biopsy     Biopsy - spot under nose x3 years       History of Present Illness:  Mr. Quique Lyons is a 65 year old male who presents for biopsy of concerning skin lesion.    He was seen for virtual visit on 11/18/20 when plan was made to biopsy the spot underneath the left nare.    Today, he reports no change to the spot. No new concerns today.    No other concerns addressed today.    Past Medical History:   Patient Active Problem List   Diagnosis     Advanced directives, counseling/discussion     Tobacco abuse     Atrial flutter (H)     CRICKET (obstructive sleep apnea)- mild (AHI 8)     Past Medical History:   Diagnosis Date     Atrial fibrillation with RVR (H) 9/11/2020     Chronic obstructive pulmonary disease with acute exacerbation (HCC) 12/1/2015     Past Surgical History:   Procedure Laterality Date     EP ABLATION ATRIAL FLUTTER N/A 10/16/2020    Procedure: EP Ablation Atrial Flutter;  Surgeon: Angeli Seth MD;  Location:  [facility]ART CARDIAC CATH LAB       Social History:  Patient reports that he has been smoking. He started smoking about 47 years ago. He has been smoking about 1.00 pack per day. He has never used smokeless tobacco. He reports current alcohol use. He reports that he does not use drugs. Works as a monsalve.     Family History:  Family History   Problem Relation Age of Onset     Dementia Father      Diabetes No family hx of      Coronary Artery Disease No family hx of      Colon Cancer No family hx of         Medications:  Current Outpatient Medications   Medication Sig Dispense Refill     lisinopril (ZESTRIL) 20 MG tablet Take 1 tablet (20 mg) by mouth daily 30 tablet 11       No Known Allergies    Review of Systems:  -Constitutional: Patient is otherwise feeling well, in usual state of health.   -Skin: As above in HPI. No additional skin concerns.    Physical exam:  Vitals: There were no vitals taken for this visit.  GEN: This is a well developed, well-nourished male in no acute distress, in a pleasant mood.    SKIN: Focused examination of the nose was performed.  - Lambert Type II  - Pink pearly papule with arborizing vessels that starts just underneath the left nare and extends a few mm into the left nare. There is a satelite 1mm blue macule in the left alar crease that has blue gray ovid nests of pigment.  - No other lesions of concern on areas examined.     Impression/Plan:    1. NUB, inferior to the left nare, concerning for BCC with a satelite BCC in the left nasal alar crease:  - Shave biopsy:  After discussion of benefits and risks including but not limited to bleeding/bruising, pain/swelling, infection, scar, incomplete removal, nerve damage/numbness, recurrence, and non-diagnostic biopsy, written consent, verbal consent and photographs were obtained. Time-out was performed. The area was cleaned with isopropyl alcohol. 0.5ml of 1% lidocaine with 1:100,000 epinephrine was injected to obtain adequate anesthesia. A shave biopsy was performed. Hemostasis was achieved with aluminium chloride and electrocautery. Vaseline and a sterile dressing were applied. The patient tolerated the procedure and no complications were noted. The patient was provided with verbal and written post care instructions.  - Will call with results when available.    Follow-up TBD by biopsy results.    Staff Involved:  Staff and scribe    Scribe Disclosure  I, Pili Roa LPN, am serving as a scribe to document services  personally performed by Dr. Edilia Patel MD, based on data collection and the provider's statements to me.     Provider Disclosure:   The documentation recorded by the scribe accurately reflects the services I personally performed and the decisions made by me.    Edilia Patel MD    Department of Dermatology  [facility]: Phone: [phone], Fax:[phone]  [facility]: Phone: [phone], Fax: [phone]              The following medication was given:     MEDICATION:  Lidocaine with epinephrine 1% 1:[id number]  ROUTE: SQ  SITE: see procedure note  DOSE: 0.5cc  LOT #: -EV  : Hospira  EXPIRATION DATE: 02/2021  NDC#: 6875-9880-01   Was there drug waste? 1.5cc  Multi-dose vial: Yes    Pili Roa LPN  December 1, 2020          Again, thank you for allowing me to participate in the care of your patient.        Sincerely,        Edilia Patel MD

## 2020-12-07 ENCOUNTER — VIRTUAL VISIT (OUTPATIENT)
Dept: DERMATOLOGY | Facility: CLINIC | Age: 65
End: 2020-12-07
Payer: COMMERCIAL

## 2020-12-07 ENCOUNTER — TELEPHONE (OUTPATIENT)
Dept: DERMATOLOGY | Facility: CLINIC | Age: 65
End: 2020-12-07

## 2020-12-07 DIAGNOSIS — C44.311 BASAL CELL CARCINOMA OF LEFT SIDE OF NOSE: Primary | ICD-10-CM

## 2020-12-07 LAB — COPATH REPORT: NORMAL

## 2020-12-07 PROCEDURE — 99212 OFFICE O/P EST SF 10 MIN: CPT | Mod: TEL | Performed by: DERMATOLOGY

## 2020-12-07 NOTE — TELEPHONE ENCOUNTER
Pt was called and made aware of results. No questions or concerns. MOHS consult and procedure scheduled.    Brianne De La Fuente LPN

## 2020-12-07 NOTE — TELEPHONE ENCOUNTER
MOHS previsit information                                                    Quique Lyons is a 65 year old male     Patient is being referred to Dr. Smith  Referring Provider: Dr. Patel  For treatment of: basal cell carcinoma  Site(s): left nare  -if site is groin or below the knee send to RN to initiate antibiotic prophylaxis protocol.  Previous Records received:  YES    Medication & Allergy Information                                                    CURRENT MEDICATIONS:   Current Outpatient Medications   Medication Sig Dispense Refill     lisinopril (ZESTRIL) 20 MG tablet Take 1 tablet (20 mg) by mouth daily 30 tablet 11       Do you take the following medications:  Aspirin:  NO  Ibuprofen/Advil/Motrin, Aleve/Naproxen:   NO  Coumadin, Eliquis, Pradaxa, Xarelto:   NO  -If on Coumadin, INR should be checked within 7 days of surgery  Vitamin E:   NO  Plavix:   NO    [place]'s wart: NO   Garlic supplement:  NO   Fish Oil: NO  Antibiotic Prophylaxis:  NO    Do you have an ALLERGIC REACTION to any medications:  NO   Patient has no known allergies.    Past Medical History                                                    Do you currently or have you previously had any of the following conditions:       HIV/AIDS:  NO    Hepatitis:  NO    Suppressed Immune System:  NO    Autoimmune disorder (eg RA, Lupus):  NO    Prolonged bleeding or bleeding disorder:  NO    Fever blisters/herpes, cold sores:  NO    Kidney disease:  NO  Creatinine   Date Value Ref Range Status   10/16/2020 0.96 0.66 - 1.25 mg/dL Final   ]    Pacemaker or Defibrillator:  NO.      History of artificial or heart valve replacement:  NO.      Endocarditis: NO    Organ transplant: NO.      Joint replacement within past 2 years: NO    Previous prosthetic joint infection: N/A    Diabetes: NO    Pregnant:: NO    Keloids or Abnormal scars: NO    Mobility device (wheelchair, transfer difficulty): NO    Tobacco use:  YES.    History   Smoking Status      Current Every Day Smoker     Packs/day: 1.00     Start date: 1973   Smokeless Tobacco     Never Used     Comment: off and on quit for nine years and started again       If any positives, send to RN to initiate antibiotic prophylaxis protocol and/or anticoagulation management protocol    Reviewed by:  Brianne De La Fuente LPN

## 2020-12-07 NOTE — PROGRESS NOTES
[facility] Dermatology Record:  Store and Forward and Telephone Encounter      Dermatology Problem List:  1. BCC, inferior to the left nare, schedule Mohs    Encounter Date: Dec 7, 2020    CC:   Chief Complaint   Patient presents with     Basal Cell Carcinoma       History of Present Illness:  Quique Lyons is a 65 year old male who presents for Mohs surgery consultation for basal cell carcinoma.  The biopsy was of a papule on the left nasal sill.  After the biopsy the wound has been healing well.  He states that he has had a mustache for 45 years and wonders if shaving is appropriate to prepare for surgery.  He has questions about activity limitations following surgery.       ROS: Patient is generally feeling well today     Physical Examination:  General: Well-appearing, appropriately-developed individual.  Skin: Focused examination including left upper lip and nose was performed.   -1.5 cm pink pearly papule on the left nasal sill.     Labs:  Derm path report reviewed, BCC nodular type    Past Medical History:   Patient Active Problem List   Diagnosis     Advanced directives, counseling/discussion     Tobacco abuse     Atrial flutter (H)     CRICKET (obstructive sleep apnea)- mild (AHI 8)     Past Medical History:   Diagnosis Date     Atrial fibrillation with RVR (H) 9/11/2020     Chronic obstructive pulmonary disease with acute exacerbation (HCC) 12/1/2015     Past Surgical History:   Procedure Laterality Date     EP ABLATION ATRIAL FLUTTER N/A 10/16/2020    Procedure: EP Ablation Atrial Flutter;  Surgeon: Angeli Seth MD;  Location:  HEART CARDIAC CATH LAB       Social History:  Patient reports that he has been smoking. He started smoking about 48 years ago. He has been smoking about 1.00 pack per day. He has never used smokeless tobacco. He reports current alcohol use. He reports that he does not use drugs.    Family History:  Family History   Problem Relation Age of Onset     Dementia Father       Diabetes No family hx of      Coronary Artery Disease No family hx of      Colon Cancer No family hx of        Medications:  Current Outpatient Medications   Medication     lisinopril (ZESTRIL) 20 MG tablet     No current facility-administered medications for this visit.           No Known Allergies        Impression and Recommendations (Patient Counseled on the Following):  1.  BCC nodular type, inferior to the left nare  The nature, risks, benefits, and alternatives to Mohs surgery were discussed. The patient would like to proceed with Mohs surgery.    Schedule Mohs surgery  Shaving his mustache would be appropriate      Staff only    Noe Smith DO    Department of Dermatology  [facility]: Phone: [phone], Fax:[phone]  [facility]: Phone: [phone], Fax: [phone]      __________________________________________________________    Teledermatology information:  - Location of patient: [place]  - Patient presented as: return  - Location of teledermatologist:  ([facility] )  - Image quality and interpretability: acceptable  - Physician has received verbal consent for a Video/Photos Visit from the patient? YES  - In-person dermatology visit recommendation: no  - Date of images: 12/1/20  - Service start time: 1535  - Service end time: 1545  - Date of report: 12/7/2020

## 2020-12-07 NOTE — TELEPHONE ENCOUNTER
"Patient Name: BEBO CHÁVEZ   MR#: [id number]   Specimen #: [id number]   Collected: 12/1/2020   Received: 12/2/2020   Reported: 12/7/2020 12:43   Ordering Phy(s): SIM IVAN     For improved result formatting, select 'View Enhanced Report Format' under    Linked Documents section.     SPECIMEN(S):   Skin, inferior to the left nare, shave     FINAL DIAGNOSIS:   Skin, inferior to the left nare, shave:   - Basal cell carcinoma, nodular type, extending to the lateral and deep   margins - (see description)     I have personally reviewed all specimens and/or slides, including the   listed special stains, and used them   with my medical judgement to determine or confirm the final diagnosis.     Electronically signed out by:     Jeremias Mensah M.D., [facility]     CLINICAL HISTORY:   The patient is a 65 year old male.     GROSS:   The specimen is received in formalin with proper patient identification,   labeled \"inferior to left nare\" and   the specimen consists of a single, irregular skin shave measuring 0.5 x   0.2 cm.  The skin surface displays no   distinct lesion.  The resection margin is inked black.  It is bisected and    entirely submitted in cassette A1.   (Dictated by: Henrietta Ruiz 12/2/2020 09:35 AM)     MICROSCOPIC:   The specimen exhibits a tumor of atypical basaloid epithelium arranged as   islands in the dermis with   fibromyxoid stroma and clefting artifact.     The technical component of this testing was completed at the [facility], with the professional component performed    at the [facility], [address] [place] [id number] ([phone])     CPT Codes:   A: 00032-BH9.P, 61703-EO6.T     COLLECTION SITE:   Client: [facility]   Location: [facility] ()       [facility]         Specimen Collected: 12/01/20  9:54 AM " Last Resulted: 12/07/20 12:45 PM        Lab Flowsheet     Order Details     View Encounter     Lab and Collection Details     Routing     Result History           Linked Documents    View Enhanced Report Format      Result Communications    Result Notes   Brianne De La Fuente LPN   12/7/2020  2:23 PM CST      Pt was called and made aware of results. No questions or concerns. MOHS consult and procedure scheduled.     MAURO Hoyos Lori, MD   12/7/2020  1:12 PM CST      Please let patient know that as expected, biopsy showed BCC. This will need Mohs to treat.      Because of the large size of this lesion and potential skip area near the alar crease, I do think Dr. Smith will want to do a consultation first.     Patient would like to schedule before the end of the year as he has met his deductible for the year.     Please schedule skin check with me in 6 months.     Edilia Patel MD    Department of Dermatology  [facility]: Phone: [phone], Fax:[phone]  [facility]: Phone: [phone], Fax: [phone]

## 2020-12-07 NOTE — LETTER
12/7/2020         RE: Quique Lyons  [address] [address] [address]        Dear Colleague,    Thank you for referring your patient, Quique Lyons, to the [facility]. Please see a copy of my visit note below.    [facility] Dermatology Record:  Store and Forward and Telephone Encounter      Dermatology Problem List:  1. BCC, inferior to the left nare, schedule Mohs    Encounter Date: Dec 7, 2020    CC:   Chief Complaint   Patient presents with     Basal Cell Carcinoma       History of Present Illness:  Quique Lyons is a 65 year old male who presents for Mohs surgery consultation for basal cell carcinoma.  The biopsy was of a papule on the left nasal sill.  After the biopsy the wound has been healing well.  He states that he has had a mustache for 45 years and wonders if shaving is appropriate to prepare for surgery.  He has questions about activity limitations following surgery.       ROS: Patient is generally feeling well today     Physical Examination:  General: Well-appearing, appropriately-developed individual.  Skin: Focused examination including left upper lip and nose was performed.   -1.5 cm pink pearly papule on the left nasal sill.     Labs:  Derm path report reviewed, BCC nodular type    Past Medical History:   Patient Active Problem List   Diagnosis     Advanced directives, counseling/discussion     Tobacco abuse     Atrial flutter (H)     CRICKET (obstructive sleep apnea)- mild (AHI 8)     Past Medical History:   Diagnosis Date     Atrial fibrillation with RVR (H) 9/11/2020     Chronic obstructive pulmonary disease with acute exacerbation (HCC) 12/1/2015     Past Surgical History:   Procedure Laterality Date     EP ABLATION ATRIAL FLUTTER N/A 10/16/2020    Procedure: EP Ablation Atrial Flutter;  Surgeon: Angeli Seth MD;  Location:  HEART CARDIAC CATH LAB       Social History:  Patient reports that he has been smoking. He started smoking about  48 years ago. He has been smoking about 1.00 pack per day. He has never used smokeless tobacco. He reports current alcohol use. He reports that he does not use drugs.    Family History:  Family History   Problem Relation Age of Onset     Dementia Father      Diabetes No family hx of      Coronary Artery Disease No family hx of      Colon Cancer No family hx of        Medications:  Current Outpatient Medications   Medication     lisinopril (ZESTRIL) 20 MG tablet     No current facility-administered medications for this visit.           No Known Allergies        Impression and Recommendations (Patient Counseled on the Following):  1.  BCC nodular type, inferior to the left nare  The nature, risks, benefits, and alternatives to Mohs surgery were discussed. The patient would like to proceed with Mohs surgery.    Schedule Mohs surgery  Shaving his mustache would be appropriate      Staff only    Noe Smith DO    Department of Dermatology  [facility]: Phone: [phone], Fax:[phone]  [facility]: Phone: [phone], Fax: [phone]      __________________________________________________________    Teledermatology information:  - Location of patient: [place]  - Patient presented as: return  - Location of teledermatologist:  ([facility] )  - Image quality and interpretability: acceptable  - Physician has received verbal consent for a Video/Photos Visit from the patient? YES  - In-person dermatology visit recommendation: no  - Date of images: 12/1/20  - Service start time: 1535  - Service end time: 1545  - Date of report: 12/7/2020         Again, thank you for allowing me to participate in the care of your patient.        Sincerely,        Noe Smith MD

## 2020-12-09 NOTE — PROGRESS NOTES
"Quique Lyons is a 65 year old male who is being evaluated via a billable video visit.      The patient has been notified of following:     \"This video visit will be conducted via a call between you and your physician/provider. We have found that certain health care needs can be provided without the need for an in-person physical exam.  This service lets us provide the care you need with a video conversation.  If a prescription is necessary we can send it directly to your pharmacy.  If lab work is needed we can place an order for that and you can then stop by our lab to have the test done at a later time.    Video visits are billed at different rates depending on your insurance coverage.  Please reach out to your insurance provider with any questions.    If during the course of the call the physician/provider feels a video visit is not appropriate, you will not be charged for this service.\"    Patient has given verbal consent for Video visit? Yes  How would you like to obtain your AVS? MyChart  If you are dropped from the video visit, the video invite should be resent to: Text to cell phone: [phone]  Will anyone else be joining your video visit? No        Video-Visit Details    Type of service:  Video Visit    Video Start Time: 10:08 AM  Video End Time: 10:29 AM    Originating Location (pt. Location): Other Car    Distant Location (provider location):  [facility]     Platform used for Video Visit: Zerply          Sleep Study Follow-Up Visit:    Date on this visit: 12/10/2020    Quique Lyons for follow-up of his sleep study done for loud snoring, restless sleep/'jerking', excessive daytime sleepiness (ESS 18), night sweats, crowded oropharynx. Comrobid atrial flutter.       Study Date: 11/15/2020 (195.0 lbs)    Sleep Architecture:   The total recording time of the polysomnogram was 446.9 minutes. The total sleep time was 410.0 minutes. Sleep latency was decreased at 4.5 " minutes without the use of a sleep aid. REM latency was 54.0 minutes. Arousal index was 30.0 arousals per hour. Sleep efficiency was normal at 91.7%. Wake after sleep onset was 22.5 minutes. The patient spent 9.5% of total sleep time in Stage N1, 72.4% in Stage N2, 4.6% in Stage N3, and 13.4% in REM. Time in REM supine was 9.0 minutes.     Respiration:     Events ? The polysomnogram revealed a presence of 35 obstructive, 0 central, and 0 mixed apneas resulting in an apnea index of 5.1 events per hour. There were 25 obstructive hypopneas and 0 central hypopneas resulting in an obstructive hypopnea index of 3.7 and central hypopnea index of 0 events per hour. The combined apnea/hypopnea index was 8.8 events per hour (central apnea/hypopnea index was 0 events per hour). The REM AHI was 22.9 events per hour. The supine AHI was 11.0 events per hour. The RERA index was 5.1 events per hour.  The RDI was 13.9 events per hour.    Snoring - was reported as moderate to loud and intermittent.    Respiratory rate and pattern - was notable for normal respiratory rate and pattern.    Sustained Sleep Associated Hypoventilation - Transcutaneous carbon dioxide monitoring was ordered but  not available , however significant hypoventilation was not suggested by oximetry    Sleep Associated Hypoxemia - (Greater than 5 minutes O2 sat at or below 88%) was not present. Baseline oxygen saturation was 93.1%. Lowest oxygen saturation was 83.1%. Time spent less than or equal to 88% was 1.2 minutes. Time spent less than or equal to 89% was 4.0 minutes.     Movement Activity:     Periodic Limb Activity - There were 507 PLMs during the entire study. The PLM index was 74.2 movements per hour. The PLM Arousal Index was 17.1 per hour.    REM EMG Activity - Excessive transient/sustained muscle activity was not present.    Nocturnal Behavior - Abnormal sleep related behaviors were not noted     Bruxism - None apparent.     Cardiac Summary:   The  average pulse rate was 63.0 bpm. The minimum pulse rate was 47.9 bpm while the maximum pulse rate was 115.4 bpm.  One 10 beat run of SVT was noted       These findings were reviewed with patient.     Past medical/surgical history, family history, social history, medications and allergies were reviewed.      Problem List:  Patient Active Problem List    Diagnosis Date Noted     CRICKET (obstructive sleep apnea)- mild (AHI 8) 11/16/2020     Priority: Medium     11/15/2020 [facility] (195.0 lbs) - AHI 8.8, RDI 13.9, Supine AHI 11.0, REM AHI 22.9, Low O2 83.1%, Time Spent ?88% 1.2 minutes / Time Spent ?89% 4.0 minutes. PLM index was 74.2 movements per hour. The PLM Arousal Index was 17.1 per hour.       Atrial flutter (H) 10/06/2020     Priority: Medium     Admitted 9/11/20 with atrial flutter with rapid ventricular response. Converted with diltiazam       Tobacco abuse 04/06/2015     Priority: Medium     Advanced directives, counseling/discussion 01/13/2014     Priority: Low     Discussed Advance Directive planning with patient; however, patient declined at this time.          Impression/Plan:    Mild obstructive sleep apnea, primarily supine-REM related when it is severe  Treatment options reviewed   He is interested in a trial of autoCPAP 5-15 cmH20    Frequent periodic limb movements of sleep with moderate associated arousals  - Follow for now    He will follow up with me in about 2 month(s).     Twenty-five minutes spent with patient

## 2020-12-10 ENCOUNTER — VIRTUAL VISIT (OUTPATIENT)
Dept: SLEEP MEDICINE | Facility: CLINIC | Age: 65
End: 2020-12-10
Payer: COMMERCIAL

## 2020-12-10 DIAGNOSIS — G47.33 OSA (OBSTRUCTIVE SLEEP APNEA): Primary | ICD-10-CM

## 2020-12-10 PROCEDURE — 99214 OFFICE O/P EST MOD 30 MIN: CPT | Mod: GT | Performed by: INTERNAL MEDICINE

## 2020-12-13 ENCOUNTER — HEALTH MAINTENANCE LETTER (OUTPATIENT)
Age: 65
End: 2020-12-13

## 2020-12-14 ENCOUNTER — OFFICE VISIT (OUTPATIENT)
Dept: DERMATOLOGY | Facility: CLINIC | Age: 65
End: 2020-12-14
Payer: COMMERCIAL

## 2020-12-14 VITALS — DIASTOLIC BLOOD PRESSURE: 82 MMHG | SYSTOLIC BLOOD PRESSURE: 161 MMHG | HEART RATE: 69 BPM

## 2020-12-14 DIAGNOSIS — C44.311 BASAL CELL CARCINOMA OF LEFT SIDE OF NOSE: Primary | ICD-10-CM

## 2020-12-14 PROCEDURE — 17312 MOHS ADDL STAGE: CPT | Performed by: DERMATOLOGY

## 2020-12-14 PROCEDURE — 17311 MOHS 1 STAGE H/N/HF/G: CPT | Performed by: DERMATOLOGY

## 2020-12-14 PROCEDURE — 14060 TIS TRNFR E/N/E/L 10 SQ CM/<: CPT | Performed by: DERMATOLOGY

## 2020-12-14 RX ORDER — CEPHALEXIN 500 MG/1
500 CAPSULE ORAL 3 TIMES DAILY
Qty: 21 CAPSULE | Refills: 0 | Status: SHIPPED | OUTPATIENT
Start: 2020-12-14 | End: 2020-12-21

## 2020-12-14 ASSESSMENT — PAIN SCALES - GENERAL: PAINLEVEL: NO PAIN (0)

## 2020-12-14 NOTE — NURSING NOTE
Quique Lyons's goals for this visit include:   Chief Complaint   Patient presents with     Derm Problem     Quique is here today for MOHS on his left nare due to BCC       He requests these members of his care team be copied on today's visit information:     PCP: Rosemarie Payne    Referring Provider:  No referring provider defined for this encounter.    BP (!) 161/82 (BP Location: Left arm, Patient Position: Sitting, Cuff Size: Adult Regular)   Pulse 69     Do you need any medication refills at today's visit? No    Crystal Cheng LPN

## 2020-12-14 NOTE — LETTER
12/14/2020         RE: Quique Lyons  [address] [address] [address]        Dear Colleague,    Thank you for referring your patient, Quique Lyons, to the [facility]. Please see a copy of my visit note below.    [facility] Mohs Dermatologic Surgery Procedure Note    Date of Service:  Dec 14, 2020  Surgery: Mohs micrographic surgery    Case 1  Repair Type: local flap  Repair Size: 4.0x1.3 (closed with [id number]) cm  Suture Material: Fast Absorbing Gut 5-0  Tumor Type: BCC - Basal cell carcinoma  Location: Left nare  Derm-Path Accession #: [id number]  PreOp Size: 1.5x1.2 cm  PostOp Size: 2.0x2.0 cm  Mohs Accession #: [id number]  Level of Defect: fat      Procedure:  We discussed the principles of treatment and most likely complications including scarring, bleeding, infection, swelling, pain, crusting, nerve damage, large wound,  incomplete excision, wound dehiscence,  nerve damage, recurrence, and a second procedure may be recommended to obtain the best cosmetic or functional result.    Informed consent was obtained and the patient underwent the procedure as follows:  The patient was placed supine on the operating table.  The cancer was identified, outlined with a marker, and verified by the patient.  The entire surgical field was prepped with Hibiclens.  The surgical site was anesthetized using Lidocaine 1% with epi 1:100,000.      The area of clinically apparent tumor was debulked. The layer of tissue was then surgically excised using a #15 blade and was then transferred onto a specimen sheet maintaining the orientation of the specimen. Hemostasis was obtained using bipolar electrocoagulation. The wound site was then covered with a dressing while the tissue samples were processed for examination.    The excised tissue was transported to the Mohs histology laboratory maintaining the tissue orientation.  The tissue specimen was relaxed so that the entire  surgical margin was in a a single horizontal plane for sectioning and inked for precise mapping.  A precise reference map was drawn to reflect the sectioning of the specimen, colored inking of the margins, and orientation on the patient.  The tissue was processed using horizontal sectioning of the base and continuous peripheral margins.  The histopathologic sections were reviewed in conjunction with the reference map.    Total blocks: 1    Total slides:  3    Residual tumor was identified and indicated in red on the reference map, identifying the location where further tissue excision was necessary. Therefore, an additional stage of Mohs Micrographic surgery was deemed necessary.     Stage II (see map for case [id number] for details)  The patient was returned to the operating room, and the area prepped in the usual manner. The residual tumor was excised using the reference map as a guide. The specimen was transfered to a labeled specimen sheet maintaining the orientation of the specimen. Hemostasis was obtained and the wound site was covered with a dressing while the tissue was processed for examination.     The excised tissue was transported to the Mohs histology laboratory maintaining orientation. The specimen margins were inked for precise mapping and a reference map was prepared for the is additional stage to maintain precise orientation as described above. The tissue was processed using horizontal sectioning of the base and continuous peripheral margins. The histopathologic sections were reviewed in conjunction with the reference map.     Total blocks: 1    Total slides:  1    There were no cancer cells visualized on examination, therefore Mohs surgery was complete.       Reconstruction:  Bilateral Island Pedicle    PROCEDURE:  The patient was taken to the operative suite and placed supine on the operating room table.  The wound was identified and the planned reconstruction was outlined with a marker.  The area  was then infiltrated with 1% lidocaine with 1:100,000 epinephrine.  The area was then prepped in usual sterile fashion.  The wound was debeveled and undermined bluntly, except for under the flaps.  The flaps were incised with a #15 scalpel, down to the level of fat.  The flaps were then freed by undermining laterally and posteriorly to each flap, as well as the portion of the flap proximal to the defect. Hemostasis was obtained with bipolar electrocoagulation.  The right flap was transposed to resurface the left side of the columella. The left flap was transposed to resurface the inferior left alar rim. Each was sutures into place in a layered fashion (5-0 monocryl and 5-0 Fast Absorbing Gut).  The upper lip was then undermined in fat deep to hair follicles. plication sutures were deployed to advance the upper lip into the primary defect. The upper lip was sutured to the nasal sill and inferior nasal lining in a layered fashion (5-0 monocryl and 5-0 fast absorbing gut).     The wound was cleansed with saline and mupirocin 2% ointment was applied.  A non-adherent pressure dressing was placed.  The patient will have virtual follow up in 10 to 14 days.  Wound care was reviewed verbally and in writing.  The patient left the operative suite in stable condition.  Anticipate Dermabrasion to be used as a second stage of this reconstruction.     Post-Operative Size:  4.0x1.3 cm  Sutures Used:  5-0 Monocryl; 5-0 Fast Absorbing Gut.    The Attending surgeon was present for entire procedure and always immediately available.    I personally performed the procedures today.    Noe mSith DO    Department of Dermatology  [facility]: Phone: [phone], Fax:[phone]  [facility]: Phone: [phone], Fax: [phone]    Care and Laboratory Testing Performed at:  [facility]    Dermatology Clinic  [address] [place] [address]      Again, thank you for allowing me to participate in the care of your patient.        Sincerely,        Noe Smith MD

## 2020-12-14 NOTE — NURSING NOTE
The following medication was given:     MEDICATION:  Lidocaine with epinephrine 1% 1:[id number]  ROUTE: SQ  SITE: see procedure note  DOSE: 10cc  LOT #: -EV  : Hospira  EXPIRATION DATE: 02/2021  NDC#: 9747-8366-81   Was there drug waste? 2cc  Multi-dose vial: Yes    Crystal Cheng LPN  December 14, 2020    Vaseline and pressure dressing applied to Mohs site on left nare and left apical triangle.  Wound care instructions reviewed with patient and AVS provided.  Patient verbalized understanding. No further questions or concerns at this time.    Crystal Cheng LPN

## 2020-12-14 NOTE — PATIENT INSTRUCTIONS
MOHS Wound Care Instructions  I will experience scar, altered skin color, bleeding, swelling, pain, crusting and redness. I may experience altered sensation. Risks are excessive bleeding, infection, muscle weakness, thick (hypertrophic or keloidal) scar, and recurrence,. A second procedure may be recommended to obtain the best cosmetic or functional result.  Possible complications of any surgical procedure are bleeding, infection, scarring, alteration in skin color and sensation, muscle weakness in the area, wound dehiscence or seperation, or recurrence of the lesion or disease. On occasion, after healing, a secondary procedure or revision may be recommended in order to obtain the best cosmetic or functional result.   After your surgery, a pressure bandage will be placed over the area that has sutures. This will help prevent bleeding. Please follow these instructions as they will help you to prevent complications as your wound heals.  For the First 48 hours After Surgery:  1. Leave the pressure bandage on and keep it dry. If it should come loose, you may retape it, but do not take it off.  2. Relax and take it easy. Do not do any vigorous exercise, heavy lifting, or bending forward. This could cause the wound to bleed.  3. Post-operative pain is usually mild. You may take plain or extra strength Tylenol every 4 hours as needed (do not take more than 4,000mg in one day). Do not take any medicine that contains aspirin, ibuprofen or motrin unless you have been recommended these by a doctor.  Avoid alcohol and vitamin E as these may increase your tendency to bleed.  4. You may put an ice pack around the bandaged area for 20 minutes every 2-3 hours. This may help reduce swelling, bruising, and pain. Make sure the ice pack is waterproof so that the pressure bandage does not get wet.   5. You may see a small amount of drainage or blood on your pressure bandage. This is normal. However, if drainage or bleeding continues or  saturates the bandage, you will need to apply firm pressure over the bandage with a washcloth for 15 minutes. If bleeding continues after applying pressure for 15 minutes then go to the nearest emergency room.  48 Hours After Surgery  Carefully remove the bandage and start daily wound care and dressing changes. You may also now shower and get the wound wet. Wash wound with a mild soap and water.  Use caution when washing the wound. Be gentle and do not let the forceful shower stream hit the wound directly.  PAT dry.  Daily Wound Care:  1. Wash wound with a mild soap and water.  Use caution when washing the wound, be gentle and do not let the forceful shower stream hit the wound directly.  2. PAT DRY.  3. Apply Vaseline (from a new container or tube) over the suture line with a Q-tip. It is very important to keep the wound continuously moist, as wounds heal best in a moist environment.  4.  Keep the site covered until sutures are dissolved, you can cover it with a Telfa (non-stick) dressing and tape or a band-aid.    5. If you are unable to keep wound covered, you must apply Vaseline every 2 - 3 hours (while awake) to ensure it is being kept moist for optimal healing. A dressing overnight is recommended to keep the area moist.   Call Us If:  1. You have pain that is not controlled with Tylenol.  2. You have signs or symptoms of an infection, such as: fever over 100 degrees F, redness, warmth, or foul-smelling or yellow/creamy drainage from the wound.  Who should I call with questions?    [facility]: [phone]     [facility]: [phone]    For urgent needs outside of business hours call the [facility] at [phone] and ask for the dermatology resident on call

## 2020-12-22 ENCOUNTER — MYC MEDICAL ADVICE (OUTPATIENT)
Dept: DERMATOLOGY | Facility: CLINIC | Age: 65
End: 2020-12-22

## 2020-12-24 ENCOUNTER — VIRTUAL VISIT (OUTPATIENT)
Dept: DERMATOLOGY | Facility: CLINIC | Age: 65
End: 2020-12-24
Payer: COMMERCIAL

## 2020-12-24 DIAGNOSIS — C44.01 BASAL CELL CARCINOMA (BCC) OF SKIN OF LEFT UPPER LIP: Primary | ICD-10-CM

## 2020-12-24 PROCEDURE — 99024 POSTOP FOLLOW-UP VISIT: CPT | Mod: TEL | Performed by: DERMATOLOGY

## 2020-12-24 ASSESSMENT — PAIN SCALES - GENERAL: PAINLEVEL: MILD PAIN (2)

## 2020-12-24 NOTE — PROGRESS NOTES
[facility] Dermatology Record:  Store and Forward and Telephone [phone]      Dermatology Problem List:  1. BCC, inferior to the left nare, s/p Mohs and advancement flap repair 12/14/20       Encounter Date: Dec 24, 2020    CC:   Chief Complaint   Patient presents with     Wound Check     left nare sp MOHS 12/14/2020       History of Present Illness:  Quique Lyons is a 65 year old male who presents for wound check.    Mild pain persisting. Has some numbness under his left nostril.   Has some crust, but stopped applying petroleum jelly and a dressing a few days ago.   Sutures appear to have dissolved.       ROS: Patient is generally feeling well today     Physical Examination:  General: Well-appearing, appropriately-developed individual.  Skin:  Exam was performed by photo review and is significant for the following:  - Well healed surgical scars.    Superficial necrosis and crust overlying the two Island pedical flaps in the nasal sill.    Labs:  N/A    Past Medical History:   Patient Active Problem List   Diagnosis     Advanced directives, counseling/discussion     Tobacco abuse     Atrial flutter (H)     CRICKET (obstructive sleep apnea)- mild (AHI 8)     Past Medical History:   Diagnosis Date     Atrial fibrillation with RVR (H) 9/11/2020     Chronic obstructive pulmonary disease with acute exacerbation (HCC) 12/1/2015     Past Surgical History:   Procedure Laterality Date     EP ABLATION ATRIAL FLUTTER N/A 10/16/2020    Procedure: EP Ablation Atrial Flutter;  Surgeon: Angeli Seth MD;  Location:  HEART CARDIAC CATH LAB       Social History:  Patient reports that he has been smoking. He started smoking about 48 years ago. He has been smoking about 1.00 pack per day. He has never used smokeless tobacco. He reports current alcohol use. He reports that he does not use drugs.    Family History:  Family History   Problem Relation Age of Onset     Dementia Father      Diabetes No family hx of       Coronary Artery Disease No family hx of      Colon Cancer No family hx of        Medications:  Current Outpatient Medications   Medication     lisinopril (ZESTRIL) 20 MG tablet     No current facility-administered medications for this visit.        No Known Allergies      Impression and Recommendations (Patient Counseled on the Following):  1. BCC, inferior to the left nare  S/p Mohs and advancement flap repair  Wound healing appropriately.  Some superficial necrosis present and advanced portions of the flap.   Continue wound care until all portions of the wound are healed over with new pink skin.  Continue sun avoidance and sun protection practices.  Follow-up for scar evaluation in 6 months    Staff only    Noe Smith DO    Department of Dermatology  [facility]: Phone: [phone], Fax:[phone]  [facility]: Phone: [phone], Fax: [phone]      _____________________________________________________________________________    Teledermatology information:  - Location of teledermatologist:  [facility] )  - Image quality and interpretability: acceptable  - Date of images: 12/23/20  - Service start time: 0808  - Service end time: 0814  - Date of report: 12/24/2020

## 2020-12-24 NOTE — NURSING NOTE
Teledermatology Nurse Call Patients:     Are you  in the [facility] at the time of the encounter?   yes    Today's visit will be billed to you and your insurance.    A teledermatology visit is not as thorough as an in-person visit and the quality of the photograph sent may not be of the same quality as that taken by the dermatology clinic.         Monica Bai LPN

## 2020-12-24 NOTE — LETTER
12/24/2020         RE: Quique Lyons  [address] [address] [address]        Dear Colleague,    Thank you for referring your patient, Quique Lyons, to the [facility]. Please see a copy of my visit note below.    [facility] Dermatology Record:  Store and Forward and Telephone [phone]      Dermatology Problem List:  1. BCC, inferior to the left nare, s/p Mohs and advancement flap repair 12/14/20       Encounter Date: Dec 24, 2020    CC:   Chief Complaint   Patient presents with     Wound Check     left nare sp MOHS 12/14/2020       History of Present Illness:  Quique Lyons is a 65 year old male who presents for wound check.    Mild pain persisting. Has some numbness under his left nostril.   Has some crust, but stopped applying petroleum jelly and a dressing a few days ago.   Sutures appear to have dissolved.       ROS: Patient is generally feeling well today     Physical Examination:  General: Well-appearing, appropriately-developed individual.  Skin:  Exam was performed by photo review and is significant for the following:  - Well healed surgical scars.    Superficial necrosis and crust overlying the two Island pedical flaps in the nasal sill.    Labs:  N/A    Past Medical History:   Patient Active Problem List   Diagnosis     Advanced directives, counseling/discussion     Tobacco abuse     Atrial flutter (H)     CRICKET (obstructive sleep apnea)- mild (AHI 8)     Past Medical History:   Diagnosis Date     Atrial fibrillation with RVR (H) 9/11/2020     Chronic obstructive pulmonary disease with acute exacerbation (HCC) 12/1/2015     Past Surgical History:   Procedure Laterality Date     EP ABLATION ATRIAL FLUTTER N/A 10/16/2020    Procedure: EP Ablation Atrial Flutter;  Surgeon: Angeli Seth MD;  Location:  HEART CARDIAC CATH LAB       Social History:  Patient reports that he has been smoking. He started smoking about 48 years ago. He has been smoking  about 1.00 pack per day. He has never used smokeless tobacco. He reports current alcohol use. He reports that he does not use drugs.    Family History:  Family History   Problem Relation Age of Onset     Dementia Father      Diabetes No family hx of      Coronary Artery Disease No family hx of      Colon Cancer No family hx of        Medications:  Current Outpatient Medications   Medication     lisinopril (ZESTRIL) 20 MG tablet     No current facility-administered medications for this visit.        No Known Allergies      Impression and Recommendations (Patient Counseled on the Following):  1. BCC, inferior to the left nare  S/p Mohs and advancement flap repair  Wound healing appropriately.  Some superficial necrosis present and advanced portions of the flap.   Continue wound care until all portions of the wound are healed over with new pink skin.  Continue sun avoidance and sun protection practices.  Follow-up for scar evaluation in 6 months    Staff only    Noe Smith DO    Department of Dermatology  [facility]: Phone: [phone], Fax:[phone]  [facility]: Phone: [phone], Fax: [phone]      _____________________________________________________________________________    Teledermatology information:  - Location of teledermatologist:  ([facility] )  - Image quality and interpretability: acceptable  - Date of images: 12/23/20  - Service start time: 0808  - Service end time: 0814  - Date of report: 12/24/2020         Again, thank you for allowing me to participate in the care of your patient.        Sincerely,        Noe Smith MD

## 2020-12-28 ENCOUNTER — DOCUMENTATION ONLY (OUTPATIENT)
Dept: SLEEP MEDICINE | Facility: CLINIC | Age: 65
End: 2020-12-28

## 2020-12-28 DIAGNOSIS — G47.33 OSA (OBSTRUCTIVE SLEEP APNEA): ICD-10-CM

## 2020-12-28 NOTE — PROGRESS NOTES
Patient was offered choice of vendor and chose [facility].  Patient Quique Lyons was set up at [place]  on December 21, 2020. Patient received a Resmed AirSense 10 Auto. Pressures were set at 5-15 cm H2O.   Patient s ramp is 5 cm H2O for Auto and FLEX/EPR is 2.  Patient received a Resmed Mask name: [id number]  Full Face mask size Medium, heated tubing and heated humidifier.  Patient does not need to meet compliance.  Emerita White

## 2020-12-31 ENCOUNTER — DOCUMENTATION ONLY (OUTPATIENT)
Dept: SLEEP MEDICINE | Facility: CLINIC | Age: 65
End: 2020-12-31

## 2020-12-31 DIAGNOSIS — G47.33 OSA (OBSTRUCTIVE SLEEP APNEA): ICD-10-CM

## 2020-12-31 NOTE — PROGRESS NOTES
3 DAY STM VISIT    Diagnostic AHI: 8.8 4.7  PSG    Patient contacted for 3 day STM visit    Confirmed with patient at time of call- N/A Patient is still interested in STM service     Message left for patient to return call    Replacement device: No  STM ordered by provider: Yes     Device type: Auto-CPAP  PAP settings from order::  CPAP min 5 cm  H20       CPAP max 15 cm  H20        Mask type:    Nasal Mask     Device settings from machine      Min CPAP 5.0            Max CPAP 15.0               EPR level Setting: TWO      RESMED Soft response setting:  OFF  Assessment: Nightly usage over four hours.  Action plan: Patient to have 14 day STM visit. Patient has a follow up visit scheduled:   not required by insurance.     Total time spent on accessing and  interpreting remote patient PAP therapy data  10 minutes  Total time spent counseling, coaching  and reviewing PAP therapy data with patient  0 minutes  [id number] no

## 2021-01-04 NOTE — PROGRESS NOTES
Patient returned call.    Subjective measures:   Patient feels things are going pretty well so far.  No issues with dryness or soreness.  He is working on mask fit.      Assessment: Pt meeting objective benchmarks.  Patient meeting subjective benchmarks.     Action plan:pt to have 14 day [facility] visit.      Total time spent counseling, coaching  and reviewing PAP therapy data with patient  3 minutes     [id number] (this call)    [id number] no (previous call)

## 2021-01-06 NOTE — PROGRESS NOTES
[facility] Mohs Dermatologic Surgery Procedure Note    Date of Service:  Dec 14, 2020  Surgery: Mohs micrographic surgery    Case 1  Repair Type: local flap  Repair Size: 4.0x1.3 (closed with JA59-907Y) cm  Suture Material: Fast Absorbing Gut 5-0  Tumor Type: BCC - Basal cell carcinoma  Location: Left nare  Derm-Path Accession #: [id number]  PreOp Size: 1.5x1.2 cm  PostOp Size: 2.0x2.0 cm  Mohs Accession #: [id number]  Level of Defect: fat      Procedure:  We discussed the principles of treatment and most likely complications including scarring, bleeding, infection, swelling, pain, crusting, nerve damage, large wound,  incomplete excision, wound dehiscence,  nerve damage, recurrence, and a second procedure may be recommended to obtain the best cosmetic or functional result.    Informed consent was obtained and the patient underwent the procedure as follows:  The patient was placed supine on the operating table.  The cancer was identified, outlined with a marker, and verified by the patient.  The entire surgical field was prepped with Hibiclens.  The surgical site was anesthetized using Lidocaine 1% with epi 1:100,000.      The area of clinically apparent tumor was debulked. The layer of tissue was then surgically excised using a #15 blade and was then transferred onto a specimen sheet maintaining the orientation of the specimen. Hemostasis was obtained using bipolar electrocoagulation. The wound site was then covered with a dressing while the tissue samples were processed for examination.    The excised tissue was transported to the Mohs histology laboratory maintaining the tissue orientation.  The tissue specimen was relaxed so that the entire surgical margin was in a a single horizontal plane for sectioning and inked for precise mapping.  A precise reference map was drawn to reflect the sectioning of the specimen, colored inking of the margins, and orientation on the patient.  The tissue was  processed using horizontal sectioning of the base and continuous peripheral margins.  The histopathologic sections were reviewed in conjunction with the reference map.    Total blocks: 1    Total slides:  3    Residual tumor was identified and indicated in red on the reference map, identifying the location where further tissue excision was necessary. Therefore, an additional stage of Mohs Micrographic surgery was deemed necessary.     Stage II (see map for case [id number] for details)  The patient was returned to the operating room, and the area prepped in the usual manner. The residual tumor was excised using the reference map as a guide. The specimen was transfered to a labeled specimen sheet maintaining the orientation of the specimen. Hemostasis was obtained and the wound site was covered with a dressing while the tissue was processed for examination.     The excised tissue was transported to the Mohs histology laboratory maintaining orientation. The specimen margins were inked for precise mapping and a reference map was prepared for the is additional stage to maintain precise orientation as described above. The tissue was processed using horizontal sectioning of the base and continuous peripheral margins. The histopathologic sections were reviewed in conjunction with the reference map.     Total blocks: 1    Total slides:  1    There were no cancer cells visualized on examination, therefore Mohs surgery was complete.       Reconstruction:  Bilateral Island Pedicle    PROCEDURE:  The patient was taken to the operative suite and placed supine on the operating room table.  The wound was identified and the planned reconstruction was outlined with a marker.  The area was then infiltrated with 1% lidocaine with 1:100,000 epinephrine.  The area was then prepped in usual sterile fashion.  The wound was debeveled and undermined bluntly, except for under the flaps.  The flaps were incised with a #15 scalpel, down to the  level of fat.  The flaps were then freed by undermining laterally and posteriorly to each flap, as well as the portion of the flap proximal to the defect. Hemostasis was obtained with bipolar electrocoagulation.  The right flap was transposed to resurface the left side of the columella. The left flap was transposed to resurface the inferior left alar rim. Each was sutures into place in a layered fashion (5-0 monocryl and 5-0 Fast Absorbing Gut).  The upper lip was then undermined in fat deep to hair follicles. plication sutures were deployed to advance the upper lip into the primary defect. The upper lip was sutured to the nasal sill and inferior nasal lining in a layered fashion (5-0 monocryl and 5-0 fast absorbing gut).     The wound was cleansed with saline and mupirocin 2% ointment was applied.  A non-adherent pressure dressing was placed.  The patient will have virtual follow up in 10 to 14 days.  Wound care was reviewed verbally and in writing.  The patient left the operative suite in stable condition.  Anticipate Dermabrasion to be used as a second stage of this reconstruction.     Post-Operative Size:  4.0x1.3 cm  Sutures Used:  5-0 Monocryl; 5-0 Fast Absorbing Gut.    The Attending surgeon was present for entire procedure and always immediately available.    I personally performed the procedures today.    Noe Smith DO    Department of Dermatology  [facility]: Phone: [phone], Fax:[phone]  [facility]: Phone: [phone], Fax: [phone]    Care and Laboratory Testing Performed at:  [facility]   Dermatology Clinic  [address] [place] [address]

## 2021-01-11 ENCOUNTER — DOCUMENTATION ONLY (OUTPATIENT)
Dept: SLEEP MEDICINE | Facility: CLINIC | Age: 66
End: 2021-01-11

## 2021-01-11 DIAGNOSIS — G47.33 OSA (OBSTRUCTIVE SLEEP APNEA): ICD-10-CM

## 2021-01-11 NOTE — PROGRESS NOTES
14  DAY STM VISIT    Diagnostic AHI: 8.8 4.7 PSG    Message left for patient to return call     Assessment: Pt meeting objective benchmarks.       Action plan: waiting for patient to return call.  and pt to have 30 day STM visit.      Device type: Auto-CPAP    PAP settings: CPAP min 5.0 cm  H20       CPAP max 15.0 cm  H20      95th% pressure 14.4 cm  H20        RESMED EPR level Setting: TWO    RESMED Soft response setting:  OFF    Mask type:  Nasal Mask    Objective measures: 14 day rolling measures      Compliance  78 %      Leak  18.6  lpm  last  upload      AHI 0.7   last  upload      Average number of minutes 333      Objective measure goal  Compliance   Goal >70%  Leak   Goal < 24 lpm  AHI  Goal < 5  Usage  Goal >240        Total time spent on accessing and interpreting remote patient PAP therapy data  10 minutes    Total time spent counseling, coaching  and reviewing PAP therapy data with patient  1 minutes    [id number]  [id number]  no (3 day STM)

## 2021-02-01 ENCOUNTER — DOCUMENTATION ONLY (OUTPATIENT)
Dept: SLEEP MEDICINE | Facility: CLINIC | Age: 66
End: 2021-02-01

## 2021-02-01 DIAGNOSIS — G47.33 OSA (OBSTRUCTIVE SLEEP APNEA): ICD-10-CM

## 2021-02-01 NOTE — PROGRESS NOTES
30 DAY [facility] VISIT    Diagnostic AHI: 8.8 4.7  PSG/HST    Data only recheck     Assessment: Pt meeting objective benchmarks.   Action plan:  Patient has scheduled a follow up visit with Dr. Lazaro on 2/04/21.   Device type: Auto-CPAP  PAP settings: CPAP min 5.0 cm  H20     CPAP max 15.0 cm  H20    95th% pressure 12.3 cm  H20      RESMED EPR level Setting: TWO    RESMED Soft response setting:  OFF  Mask type:  Nasal Mask  Objective measures: 14 day rolling measures      Compliance  92 %      Leak  42.86 lpm  last  upload      AHI 0.96   last  upload      Average number of minutes 390      Objective measure goal  Compliance   Goal >70%  Leak   Goal < 24 lpm  AHI  Goal < 5  Usage  Goal >240        Total time spent on accessing and interpreting remote patient PAP therapy data  10 minutes    Total time spent counseling, coaching  and reviewing PAP therapy data with patient  0 minutes     [id number] this call  [id number] no  at 3 or 14 day [facility]

## 2021-02-04 ENCOUNTER — VIRTUAL VISIT (OUTPATIENT)
Dept: SLEEP MEDICINE | Facility: CLINIC | Age: 66
End: 2021-02-04
Payer: COMMERCIAL

## 2021-02-04 VITALS — HEIGHT: 68 IN | BODY MASS INDEX: 30.31 KG/M2 | WEIGHT: 200 LBS

## 2021-02-04 DIAGNOSIS — G47.33 OSA (OBSTRUCTIVE SLEEP APNEA): Primary | Chronic | ICD-10-CM

## 2021-02-04 PROCEDURE — 99212 OFFICE O/P EST SF 10 MIN: CPT | Mod: GT | Performed by: INTERNAL MEDICINE

## 2021-02-04 ASSESSMENT — MIFFLIN-ST. JEOR: SCORE: 1676.69

## 2021-02-04 NOTE — PROGRESS NOTES
BEBO is a 65 year old who is being evaluated via a billable video visit.      How would you like to obtain your AVS? MyChart  If the video visit is dropped, the invitation should be resent by: Text to cell phone: [phone]   Will anyone else be joining your video visit? No      Huma Proctor, MIMI on 2/4/2021 at 8:46 AM    Video Start Time: 10:05 AM     Video-Visit Details    Type of service:  Video Visit    Video End Time:10:15 AM    Originating Location (pt. Location): Home    Distant Location (provider location):  [facility]     Platform used for Video Visit: [employer]        Obstructive Sleep Apnea - PAP Follow-Up Visit:    Chief Complaint   Patient presents with     CPAP Follow Up       Bebo Lyons for follow-up of their mild sleep apnea, managed with CPAP.     [facility]     Initial sleep study done for loud snoring, restless sleep/'jerking', excessive daytime sleepiness (ESS 18), night sweats, crowded oropharynx. Comrobid atrial flutter.        Study Date: 11/15/2020 (195.0 lbs)   - Apnea/hypopnea index was 8.8 events per hour (central apnea/hypopnea index was 0 events per hour). The REM AHI was 22.9 events per hour. The supine AHI was 11.0 events per hour. RDI was 13.9 events per hour.   -  Lowest oxygen saturation was 83.1%. Time spent less than or equal to 88% was 1.2 minutes. Time spent less than or equal to 89% was 4.0 minutes.   - PLM index was 74.2 movements per hour. The PLM Arousal Index was 17.1 per hour.    Elected trial of autoCPAP 5-15 cmH20    Overall, he rates the experience with PAP as good.  He is less restless. He is less sleepy, but does not feel 'wide awake'.   Night sweats have resolved.     His PAP interface is Nasal Mask.    Bedtime is typically 8-930. Wake time is typically 5-6.      Total score - [place]: 20 (2/4/2021  8:31 AM)  GENARO Total Score: 10      ResMed   Auto-PAP 5.0 - 15.0 cmH2O 30 day usage data:    83% of days with > 4  "hours of use. 5/30 days with no use.   Average use 337 minutes per day.   95%ile Leak 36.88 L/min.   CPAP 95% pressure 12.9 cm. Median 9.3  AHI 0.84 events per hour.     He will take PAP off after 3-4 AM at times      Past medical/surgical history, family history, social history, medications and allergies were reviewed.      Current Outpatient Medications   Medication Sig Dispense Refill     lisinopril (ZESTRIL) 20 MG tablet Take 1 tablet (20 mg) by mouth daily 30 tablet 11         Problem List:  Patient Active Problem List    Diagnosis Date Noted     CRICKET (obstructive sleep apnea)- mild (AHI 8) 11/16/2020     Priority: Medium     11/15/2020 [facility] Study (195.0 lbs) - AHI 8.8, RDI 13.9, Supine AHI 11.0, REM AHI 22.9, Low O2 83.1%, Time Spent ?88% 1.2 minutes / Time Spent ?89% 4.0 minutes. PLM index was 74.2 movements per hour. The PLM Arousal Index was 17.1 per hour.       Atrial flutter (H) 10/06/2020     Priority: Medium     Admitted 9/11/20 with atrial flutter with rapid ventricular response. Converted with diltiazam       Tobacco abuse 04/06/2015     Priority: Medium     Advanced directives, counseling/discussion 01/13/2014     Priority: Low     Discussed Advance Directive planning with patient; however, patient declined at this time.          Ht 1.727 m (5' 8\")   Wt 90.7 kg (200 lb)   BMI 30.41 kg/m      Impression/Plan:     Mild Sleep apnea. Tolerating PAP well. Daytime symptoms are improved, but he still has perstistent sleepiness.   - Narrow pressures to 10-14 cmH2o    Quique Lyons will follow up in about 2 year(s), sooner if Excessive daytime sleepiness persists    I spent 15 minutes with patient including counseling, chart review, and documentation     "

## 2021-02-04 NOTE — PATIENT INSTRUCTIONS
Your BMI is Body mass index is 30.41 kg/m .  Weight management is a personal decision.  If you are interested in exploring weight loss strategies, the following discussion covers the approaches that may be successful. Body mass index (BMI) is one way to tell whether you are at a healthy weight, overweight, or obese. It measures your weight in relation to your height.  A BMI of 18.5 to 24.9 is in the healthy range. A person with a BMI of 25 to 29.9 is considered overweight, and someone with a BMI of 30 or greater is considered obese. More than two-thirds of American adults are considered overweight or obese.  Being overweight or obese increases the risk for further weight gain. Excess weight may lead to heart disease and diabetes.  Creating and following plans for healthy eating and physical activity may help you improve your health.  Weight control is part of healthy lifestyle and includes exercise, emotional health, and healthy eating habits. Careful eating habits lifelong are the mainstay of weight control. Though there are significant health benefits from weight loss, long-term weight loss with diet alone may be very difficult to achieve- studies show long-term success with dietary management in less than 10% of people. Attaining a healthy weight may be especially difficult to achieve in those with severe obesity. In some cases, medications, devices and surgical management might be considered.  What can you do?  If you are overweight or obese and are interested in methods for weight loss, you should discuss this with your provider.     Consider reducing daily calorie intake by 500 calories.     Keep a food journal.     Avoiding skipping meals, consider cutting portions instead.    Diet combined with exercise helps maintain muscle while optimizing fat loss. Strength training is particularly important for building and maintaining muscle mass. Exercise helps reduce stress, increase energy, and improves fitness.  Increasing exercise without diet control, however, may not burn enough calories to loose weight.       Start walking three days a week 10-20 minutes at a time    Work towards walking thirty minutes five days a week     Eventually, increase the speed of your walking for 1-2 minutes at time    In addition, we recommend that you review healthy lifestyles and methods for weight loss available through the National Institutes of Health patient information sites:  http://win.niddk.nih.gov/publications/index.htm    And look into health and wellness programs that may be available through your health insurance provider, employer, local community center, or sarah club.    Weight management plan: Patient was referred to their PCP to discuss a diet and exercise plan.

## 2021-04-20 ENCOUNTER — MYC MEDICAL ADVICE (OUTPATIENT)
Dept: FAMILY MEDICINE | Facility: CLINIC | Age: 66
End: 2021-04-20

## 2021-06-01 ENCOUNTER — RECORDS - HEALTHEAST (OUTPATIENT)
Dept: ADMINISTRATIVE | Facility: CLINIC | Age: 66
End: 2021-06-01

## 2021-06-15 ENCOUNTER — OFFICE VISIT (OUTPATIENT)
Dept: DERMATOLOGY | Facility: CLINIC | Age: 66
End: 2021-06-15
Payer: COMMERCIAL

## 2021-06-15 DIAGNOSIS — D18.01 CHERRY ANGIOMA: ICD-10-CM

## 2021-06-15 DIAGNOSIS — D22.9 MULTIPLE BENIGN NEVI: ICD-10-CM

## 2021-06-15 DIAGNOSIS — L81.4 SOLAR LENTIGO: ICD-10-CM

## 2021-06-15 DIAGNOSIS — L82.1 SEBORRHEIC KERATOSIS: ICD-10-CM

## 2021-06-15 DIAGNOSIS — Z85.828 HISTORY OF NONMELANOMA SKIN CANCER: Primary | ICD-10-CM

## 2021-06-15 DIAGNOSIS — L57.8 SUN-DAMAGED SKIN: ICD-10-CM

## 2021-06-15 PROCEDURE — 99213 OFFICE O/P EST LOW 20 MIN: CPT | Performed by: DERMATOLOGY

## 2021-06-15 ASSESSMENT — PAIN SCALES - GENERAL: PAINLEVEL: NO PAIN (0)

## 2021-06-15 NOTE — PROGRESS NOTES
[facility] Dermatology Note  Encounter Date: Yasmany 15, 2021  Office Visit     Dermatology Problem List:  1. 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   ____________________________________________    Assessment & Plan:     # History of NMSC. No evidence of recurrence.   - Recommend sunscreens SPF #30 or greater, protective clothing and avoidance of tanning beds.   - Next skin check in 6 months.    # Sun damaged skin with solar lentigines: Chronic, stable. Severe for age.  - Recommend sunscreens SPF #30 or greater, protective clothing and avoidance of tanning beds.    # Benign skin findings including: seborrheic keratoses, cherry angioma - minor, self limited problem  - No further intervention required. Patient to report changes.   - Patient reassured of the benign nature of these lesions.    # Multiple clinically benign nevi: Chronic, stable  - No further intervention required. Patient to report changes.   - Patient reassured of the benign nature of these lesions.    Procedures Performed:   None.    Follow-up: 6 months in-person for skin check, or earlier for new or changing lesions    Staff and Scribe:     Scribe Disclosure:   I, Helen Prabhakar, am serving as a scribe to document services personally performed by this physician, Dr. Edilia Patel, based on data collection and the provider's statements to me.     Provider Disclosure:   The documentation recorded by the scribe accurately reflects the services I personally performed and the decisions made by me.    Edilia Patel MD    Department of Dermatology  [facility]: Phone: [phone], Fax:[phone]  [facility] [place]: Phone: [phone], Fax:  [phone]    ____________________________________________    CC: Skin Check (total skin check. Area of concern-spot on left cheek, not bothersome )    HPI:  Mr. Quique Lyons is a(n) 66 year old male who presents today as a return patient for skin check.    Last seen by Dr. Smith 12/24/20 for recheck after mohs.     Today, Quique notes concern of a spot on the left cheek. It is not bothersome.    Patient is otherwise feeling well, without additional skin concerns.     Labs Reviewed:  N/A    Physical Exam:  Vitals: There were no vitals taken for this visit.  SKIN: Total skin excluding the undergarment areas was performed. The exam included the head/face, neck, both arms, chest, back, abdomen, buttocks, both legs, digits and/or nails.   - Lambert Type III  - Scattered brown macules on sun exposed areas. Very tan today.  - There are dome shaped bright red papules on the trunk.   - Multiple regular brown pigmented macules and papules are identified on the trunk and extremities. About 40 in all. None are atypical.  - There are waxy stuck on tan to brown papules on the trunk and face.  - Well healed scar involving the left nasolabial fold.  - No other lesions of concern on areas examined.       Medications:  Current Outpatient Medications   Medication     lisinopril (ZESTRIL) 20 MG tablet     No current facility-administered medications for this visit.       Past Medical History:   Patient Active Problem List   Diagnosis     Advanced directives, counseling/discussion     Tobacco abuse     Atrial flutter (H)     CRICKET (obstructive sleep apnea)- mild (AHI 8)     Past Medical History:   Diagnosis Date     Atrial fibrillation with RVR (H) 9/11/2020     Chronic obstructive pulmonary disease with acute exacerbation (HCC) 12/1/2015       CC Referred Self, MD  No address on file on close of this encounter.

## 2021-06-15 NOTE — LETTER
6/15/2021         RE: Quique Lyons  [address] [address]        Dear Colleague,    Thank you for referring your patient, Quique Lyons, to the [facility]. Please see a copy of my visit note below.    [facility] Dermatology Note  Encounter Date: Yasmany 15, 2021  Office Visit     Dermatology Problem List:  1. 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   ____________________________________________    Assessment & Plan:     # History of NMSC. No evidence of recurrence.   - Recommend sunscreens SPF #30 or greater, protective clothing and avoidance of tanning beds.   - Next skin check in 6 months.    # Sun damaged skin with solar lentigines: Chronic, stable. Severe for age.  - Recommend sunscreens SPF #30 or greater, protective clothing and avoidance of tanning beds.    # Benign skin findings including: seborrheic keratoses, cherry angioma - minor, self limited problem  - No further intervention required. Patient to report changes.   - Patient reassured of the benign nature of these lesions.    # Multiple clinically benign nevi: Chronic, stable  - No further intervention required. Patient to report changes.   - Patient reassured of the benign nature of these lesions.    Procedures Performed:   None.    Follow-up: 6 months in-person for skin check, or earlier for new or changing lesions    Staff and Scribe:     Scribe Disclosure:   I, Helen Prabhakar, am serving as a scribe to document services personally performed by this physician, Dr. Edilia Patel, based on data collection and the provider's statements to me.     Provider Disclosure:   The documentation recorded by the scribe accurately reflects the services I personally performed and the decisions made by me.    Edilia Patel MD    Department of  Dermatology  [facility]: Phone: [phone], Fax:[phone]  [facility]: Phone: [phone], Fax: [phone]    ____________________________________________    CC: Skin Check (total skin check. Area of concern-spot on left cheek, not bothersome )    HPI:  Mr. Quique Lyons is a(n) 66 year old male who presents today as a return patient for skin check.    Last seen by Dr. Smith 12/24/20 for recheck after mohs.     Today, Quique notes concern of a spot on the left cheek. It is not bothersome.    Patient is otherwise feeling well, without additional skin concerns.     Labs Reviewed:  N/A    Physical Exam:  Vitals: There were no vitals taken for this visit.  SKIN: Total skin excluding the undergarment areas was performed. The exam included the head/face, neck, both arms, chest, back, abdomen, buttocks, both legs, digits and/or nails.   - Lambert Type III  - Scattered brown macules on sun exposed areas. Very tan today.  - There are dome shaped bright red papules on the trunk.   - Multiple regular brown pigmented macules and papules are identified on the trunk and extremities. About 40 in all. None are atypical.  - There are waxy stuck on tan to brown papules on the trunk and face.  - Well healed scar involving the left nasolabial fold.  - No other lesions of concern on areas examined.       Medications:  Current Outpatient Medications   Medication     lisinopril (ZESTRIL) 20 MG tablet     No current facility-administered medications for this visit.       Past Medical History:   Patient Active Problem List   Diagnosis     Advanced directives, counseling/discussion     Tobacco abuse     Atrial flutter (H)     CRICKET (obstructive sleep apnea)- mild (AHI 8)     Past Medical History:   Diagnosis Date     Atrial fibrillation with RVR (H) 9/11/2020     Chronic obstructive pulmonary disease with acute exacerbation  (HCC) 12/1/2015       CC Referred Self, MD  No address on file on close of this encounter.        Again, thank you for allowing me to participate in the care of your patient.        Sincerely,        Edilia Patel MD

## 2021-06-15 NOTE — NURSING NOTE
Quique Lyons's goals for this visit include:   Chief Complaint   Patient presents with     Skin Check     total skin check. Area of concern-spot on left cheek, not bothersome        He requests these members of his care team be copied on today's visit information:     PCP: Rosemarie Payne    Referring Provider:  Referred Self, MD  No address on file    There were no vitals taken for this visit.    Do you need any medication refills at today's visit? Flor Rockwell on 6/15/2021 at 8:04 AM

## 2021-07-14 ENCOUNTER — DOCUMENTATION ONLY (OUTPATIENT)
Dept: SLEEP MEDICINE | Facility: CLINIC | Age: 66
End: 2021-07-14

## 2021-07-14 DIAGNOSIS — G47.33 OSA (OBSTRUCTIVE SLEEP APNEA): ICD-10-CM

## 2021-07-14 NOTE — PROGRESS NOTES
6 month [facility] Recheck Visit     Diagnostic AHI: 8.8 4.7 PSG    Data only recheck     Assessment: Pt meeting objective benchmarks.     Action plan:   pt to follow up per provider request       Device type: Auto-CPAP  PAP settings: CPAP min 5.0 cm  H20     CPAP max 15.0 cm  H20    95th% pressure 12 cm  H20   Objective measures: 14 day rolling measures      Compliance  78 %      Leak  36 lpm  last  upload      AHI 0.68   last  upload      Average number of minutes 326      Objective measure goal  Compliance   Goal >70%  Leak   Goal < 24 lpm  AHI  Goal < 5  Usage  Goal >240    Total time spent on accessing, reviewing and interpreting remote patient PAP therapy data:   10 minutes      Total time spent with direct patient communication :   0 minutes

## 2021-08-26 ENCOUNTER — HOSPITAL ENCOUNTER (OUTPATIENT)
Dept: CARDIOLOGY | Facility: CLINIC | Age: 66
Discharge: HOME | End: 2021-08-26
Attending: INTERNAL MEDICINE | Admitting: INTERNAL MEDICINE
Payer: COMMERCIAL

## 2021-08-26 ENCOUNTER — OFFICE VISIT (OUTPATIENT)
Dept: CARDIOLOGY | Facility: CLINIC | Age: 66
End: 2021-08-26
Attending: NURSE PRACTITIONER
Payer: COMMERCIAL

## 2021-08-26 VITALS
WEIGHT: 190.4 LBS | DIASTOLIC BLOOD PRESSURE: 74 MMHG | SYSTOLIC BLOOD PRESSURE: 127 MMHG | BODY MASS INDEX: 28.95 KG/M2 | HEART RATE: 61 BPM

## 2021-08-26 DIAGNOSIS — I48.3 TYPICAL ATRIAL FLUTTER (H): ICD-10-CM

## 2021-08-26 DIAGNOSIS — I10 BENIGN ESSENTIAL HYPERTENSION: ICD-10-CM

## 2021-08-26 PROCEDURE — 99215 OFFICE O/P EST HI 40 MIN: CPT | Mod: 25 | Performed by: INTERNAL MEDICINE

## 2021-08-26 PROCEDURE — 93242 EXT ECG>48HR<7D RECORDING: CPT

## 2021-08-26 PROCEDURE — 93248 EXT ECG>7D<15D REV&INTERPJ: CPT | Performed by: INTERNAL MEDICINE

## 2021-08-26 RX ORDER — LOSARTAN POTASSIUM 25 MG/1
25 TABLET ORAL DAILY
Qty: 90 TABLET | Refills: 3 | Status: SHIPPED | OUTPATIENT
Start: 2021-08-26 | End: 2022-03-30

## 2021-08-26 NOTE — Clinical Note
8/26/2021    [facility]  [address] [facility] [address]    RE: Quique Lyons       Dear Colleague,    I had the pleasure of seeing Quique Lyons in the [facility].    CARDIOLOGY CONSULTATION:    Please see dictated note    PAST MEDICAL HISTORY:  Past Medical History:   Diagnosis Date     Atrial fibrillation with RVR (H) 9/11/2020     Chronic obstructive pulmonary disease with acute exacerbation (HCC) 12/1/2015       CURRENT MEDICATIONS:  Current Outpatient Medications   Medication Sig Dispense Refill     lisinopril (ZESTRIL) 20 MG tablet Take 1 tablet (20 mg) by mouth daily 30 tablet 11       PAST SURGICAL HISTORY:  Past Surgical History:   Procedure Laterality Date     EP ABLATION ATRIAL FLUTTER N/A 10/16/2020    Procedure: EP Ablation Atrial Flutter;  Surgeon: Angeli Seth MD;  Location:  HEART CARDIAC CATH LAB       ALLERGIES  Patient has no known allergies.    FAMILY HX:  Family History   Problem Relation Age of Onset     Dementia Father      Diabetes No family hx of      Coronary Artery Disease No family hx of      Colon Cancer No family hx of        SOCIAL HX:  Social History     Socioeconomic History     Marital status:      Spouse name: Not on file     Number of children: Not on file     Years of education: Not on file     Highest education level: Not on file   Occupational History     Occupation: [employer]   Tobacco Use     Smoking status: Current Every Day Smoker     Packs/day: 1.00     Start date: 1973     Smokeless tobacco: Never Used     Tobacco comment: off and on quit for nine years and started again   Substance and Sexual Activity     Alcohol use: Yes     Comment: socially     Drug use: No     Sexual activity: Yes     Partners: Female   Other Topics Concern     Parent/sibling w/ CABG, MI or angioplasty before 65F 55M? Not Asked   Social History Narrative     Not on file     Social Determinants  of Health     Financial Resource Strain:      Difficulty of Paying Living Expenses:    Food Insecurity:      Worried About Running Out of Food in the Last Year:      Ran Out of Food in the Last Year:    Transportation Needs:      Lack of Transportation (Medical):      Lack of Transportation (Non-Medical):    Physical Activity:      Days of Exercise per Week:      Minutes of Exercise per Session:    Stress:      Feeling of Stress :    Social Connections:      Frequency of Communication with Friends and Family:      Frequency of Social Gatherings with Friends and Family:      Attends [employer] Services:      Active Member of Clubs or Organizations:      Attends Club or Organization Meetings:      Marital Status:    Intimate Partner Violence:      Fear of Current or Ex-Partner:      Emotionally Abused:      Physically Abused:      Sexually Abused:        ROS:  Constitutional: No fever, chills, or sweats. No weight gain/loss.   ENT: No visual disturbance, ear ache, epistaxis, sore throat.   Allergies/Immunologic: Negative.   Respiratory: No cough, hemoptysis.   Cardiovascular: As per HPI.   GI: No nausea, vomiting, hematemesis, melena, or hematochezia.   : No urinary frequency, dysuria, or hematuria.   Integument: Negative.   Psychiatric: Negative.   Neuro: Negative.   Endocrinology: Negative.   Musculoskeletal: No myalgia.    VITAL SIGNS:  /74   Pulse 61   Wt 86.4 kg (190 lb 6.4 oz)   BMI 28.95 kg/m    Body mass index is 28.95 kg/m .  Wt Readings from Last 2 Encounters:   08/26/21 86.4 kg (190 lb 6.4 oz)   02/04/21 90.7 kg (200 lb)       PHYSICAL EXAM  Quique Lyons IS A 66 year old male.in no acute distress.  HEENT: Unremarkable.  Neck: JVP normal.  Carotids +4/4 bilaterally without bruits.  Lungs: CTA.  Cor: RRR. Normal S1 and S2.  No murmur, rub, or gallop.  PMI in Lf 5th ICS.  Abd: Soft, nontender, nondistended.  NABS.  No pulsatile mass.  Extremities: No C/C/E.  Pulses +4/4 symmetric in upper and  lower extremities.  Neuro: Grossly intact.    LABS    Lab Results   Component Value Date    WBC 9.0 10/16/2020     Lab Results   Component Value Date    RBC 5.13 10/16/2020     Lab Results   Component Value Date    HGB 16.7 10/16/2020     Lab Results   Component Value Date    HCT 48.2 10/16/2020     No components found for: MCT  Lab Results   Component Value Date    MCV 94 10/16/2020     Lab Results   Component Value Date    MCH 32.6 10/16/2020     Lab Results   Component Value Date    MCHC 34.6 10/16/2020     Lab Results   Component Value Date    RDW 12.4 10/16/2020     Lab Results   Component Value Date     10/16/2020      Recent Labs   Lab Test 10/16/20  1250 09/12/20  0603    142   POTASSIUM 4.3 4.4   CHLORIDE 105 113*   CO2 30 28   ANIONGAP 3 1*   GLC 90 119*   BUN 16 13   CR 0.96 0.92   WESLEY 8.9 8.4*       MARCOS Xavier MD     Service Date: 08/26/2021    HISTORY OF PRESENT ILLNESS:  Mr. Lyons is a pleasant, 66-year-old gentleman whom I am meeting for the first time.  He presented in 09/2021 with lightheadedness and was found to be in AFlutter with RVR.  He was transferred to [facility].  His ejection fraction on echo was found to be 50%-55%.  He did spontaneously convert with Cardizem, but given his symptoms, they decided to proceed with flutter ablation, which he had done 10/16/2020 and was successful.  He did go into a short run of atrial fibrillation after the ablation, but that was self limited with no known recurrence.  Following this, he was also diagnosed with obstructive sleep apnea and does currently use a CPAP mask.  He is not anticoagulated.  He was also started on lisinopril and has noted a cough that has been persistent since that medication was started.  He is .  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  He denies any symptoms of chest pain or tightness with activity.  He does, however, continue to get lightheaded.  This happens every 3-4 days and is not associated with  going from sitting to standing, and it is also new since the diagnosis of atrial fibrillation/flutter.  He has not had a monitor to evaluate this any further.  He does smoke; he is not interested in quitting at this time.  He smokes a pack per day and has a greater than 30 pack year smoking history.    IMPRESSION, REPORT AND PLAN:    1.  Atrial flutter, status post ablation on 10/16/2020.  2.  Lightheadedness on occasion, unexplained.  3.  Hypertension, on lisinopril with a cough.  4.  Nicotine dependence, ongoing, a pack per day with a greater than 30 pack year smoking history.  5.  Obstructive sleep apnea, uses a CPAP mask.      DISCUSSION:  It was a pleasure being involved in the care of Mr. Lyons.  I discussed his history and symptoms in detail.  I reviewed his testing as outlined.  His lightheadedness actually is a bit concerning as it is random and not at all associated with going from sitting to standing and will happen out of the blue and will take a couple minutes to improve.  He has not taken his pulse at all when this occurs.  This is reminiscent of actually what brought him in initially with the atrial flutter.  I do think it is worth doing a Zio patch monitor, and it is happening a couple of times per week, so hopefully we will be able to capture this if he is wearing the Zio patch for a week.  I have asked him to keep a diary.  With regard to his hypertension and lisinopril with a cough, I will switch him to losartan.  We will plan to get a CMP when he returns.  He has also not had lipids checked, so we will plan to do that when he returns as well and recheck his blood pressure at that time.  He understands and is in agreement with the plan as outlined.  I did encourage him to quit smoking; he will let us know when he is ready.      It was a pleasure to be involved in his care.  Please do not hesitate to contact me with any questions or concerns.      There was 41 minutes of face-to-face  documentation and review of records on the day of visit.    Jeff Xavier MD        D: 2021   T: 2021   MT: bethany    Name:     BEBO CHÁEVZ  MRN:      [id number]        Account:      [id number]   :      1955           Service Date: 2021       Document: [id number]      Thank you for allowing me to participate in the care of your patient.      Sincerely,     Jeff Xavier MD     [facility]  cc:   Pili Hurst, APRN CNP  [address]  [place] [address]

## 2021-08-26 NOTE — PROGRESS NOTES
Service Date: 08/26/2021    HISTORY OF PRESENT ILLNESS:  Mr. Lyons is a pleasant, 66-year-old gentleman whom I am meeting for the first time.  He presented in 09/2021 with lightheadedness and was found to be in AFlutter with RVR.  He was transferred to [facility].  His ejection fraction on echo was found to be 50%-55%.  He did spontaneously convert with Cardizem, but given his symptoms, they decided to proceed with flutter ablation, which he had done 10/16/2020 and was successful.  He did go into a short run of atrial fibrillation after the ablation, but that was self limited with no known recurrence.  Following this, he was also diagnosed with obstructive sleep apnea and does currently use a CPAP mask.  He is not anticoagulated.  He was also started on lisinopril and has noted a cough that has been persistent since that medication was started.  He is .  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  He denies any symptoms of chest pain or tightness with activity.  He does, however, continue to get lightheaded.  This happens every 3-4 days and is not associated with going from sitting to standing, and it is also new since the diagnosis of atrial fibrillation/flutter.  He has not had a monitor to evaluate this any further.  He does smoke; he is not interested in quitting at this time.  He smokes a pack per day and has a greater than 30 pack year smoking history.    IMPRESSION, REPORT AND PLAN:    1.  Atrial flutter, status post ablation on 10/16/2020.  2.  Lightheadedness on occasion, unexplained.  3.  Hypertension, on lisinopril with a cough.  4.  Nicotine dependence, ongoing, a pack per day with a greater than 30 pack year smoking history.  5.  Obstructive sleep apnea, uses a CPAP mask.      DISCUSSION:  It was a pleasure being involved in the care of Mr. Lyons.  I discussed his history and symptoms in detail.  I reviewed his testing as outlined.  His lightheadedness actually is a bit concerning as it is  random and not at all associated with going from sitting to standing and will happen out of the blue and will take a couple minutes to improve.  He has not taken his pulse at all when this occurs.  This is reminiscent of actually what brought him in initially with the atrial flutter.  I do think it is worth doing a Zio patch monitor, and it is happening a couple of times per week, so hopefully we will be able to capture this if he is wearing the Zio patch for a week.  I have asked him to keep a diary.  With regard to his hypertension and lisinopril with a cough, I will switch him to losartan.  We will plan to get a CMP when he returns.  He has also not had lipids checked, so we will plan to do that when he returns as well and recheck his blood pressure at that time.  He understands and is in agreement with the plan as outlined.  I did encourage him to quit smoking; he will let us know when he is ready.      It was a pleasure to be involved in his care.  Please do not hesitate to contact me with any questions or concerns.      There was 41 minutes of face-to-face documentation and review of records on the day of visit.    Jeff Xavier MD        D: 2021   T: 2021   MT: bethany    Name:     BEBO CHÁVEZ  MRN:      [id number]        Account:      [id number]   :      1955           Service Date: 2021       Document: [id number]

## 2021-08-26 NOTE — PROGRESS NOTES
CARDIOLOGY CONSULTATION:    Please see dictated note    PAST MEDICAL HISTORY:  Past Medical History:   Diagnosis Date     Atrial fibrillation with RVR (H) 9/11/2020     Chronic obstructive pulmonary disease with acute exacerbation (HCC) 12/1/2015       CURRENT MEDICATIONS:  Current Outpatient Medications   Medication Sig Dispense Refill     lisinopril (ZESTRIL) 20 MG tablet Take 1 tablet (20 mg) by mouth daily 30 tablet 11       PAST SURGICAL HISTORY:  Past Surgical History:   Procedure Laterality Date     EP ABLATION ATRIAL FLUTTER N/A 10/16/2020    Procedure: EP Ablation Atrial Flutter;  Surgeon: Angeli Seth MD;  Location: [facility] CARDIAC CATH LAB       ALLERGIES  Patient has no known allergies.    FAMILY HX:  Family History   Problem Relation Age of Onset     Dementia Father      Diabetes No family hx of      Coronary Artery Disease No family hx of      Colon Cancer No family hx of        SOCIAL HX:  Social History     Socioeconomic History     Marital status:      Spouse name: Not on file     Number of children: Not on file     Years of education: Not on file     Highest education level: Not on file   Occupational History     Occupation: [employer]   Tobacco Use     Smoking status: Current Every Day Smoker     Packs/day: 1.00     Start date: 1973     Smokeless tobacco: Never Used     Tobacco comment: off and on quit for nine years and started again   Substance and Sexual Activity     Alcohol use: Yes     Comment: socially     Drug use: No     Sexual activity: Yes     Partners: Female   Other Topics Concern     Parent/sibling w/ CABG, MI or angioplasty before 65F 55M? Not Asked   Social History Narrative     Not on file     Social Determinants of Health     Financial Resource Strain:      Difficulty of Paying Living Expenses:    Food Insecurity:      Worried About Running Out of Food in the Last Year:      Ran Out of Food in the Last Year:    Transportation Needs:      Lack of  Transportation (Medical):      Lack of Transportation (Non-Medical):    Physical Activity:      Days of Exercise per Week:      Minutes of Exercise per Session:    Stress:      Feeling of Stress :    Social Connections:      Frequency of Communication with Friends and Family:      Frequency of Social Gatherings with Friends and Family:      Attends [employer] Services:      Active Member of Clubs or Organizations:      Attends Club or Organization Meetings:      Marital Status:    Intimate Partner Violence:      Fear of Current or Ex-Partner:      Emotionally Abused:      Physically Abused:      Sexually Abused:        ROS:  Constitutional: No fever, chills, or sweats. No weight gain/loss.   ENT: No visual disturbance, ear ache, epistaxis, sore throat.   Allergies/Immunologic: Negative.   Respiratory: No cough, hemoptysis.   Cardiovascular: As per HPI.   GI: No nausea, vomiting, hematemesis, melena, or hematochezia.   : No urinary frequency, dysuria, or hematuria.   Integument: Negative.   Psychiatric: Negative.   Neuro: Negative.   Endocrinology: Negative.   Musculoskeletal: No myalgia.    VITAL SIGNS:  /74   Pulse 61   Wt 86.4 kg (190 lb 6.4 oz)   BMI 28.95 kg/m    Body mass index is 28.95 kg/m .  Wt Readings from Last 2 Encounters:   08/26/21 86.4 kg (190 lb 6.4 oz)   02/04/21 90.7 kg (200 lb)       PHYSICAL EXAM  Quique Lyons IS A 66 year old male.in no acute distress.  HEENT: Unremarkable.  Neck: JVP normal.  Carotids +4/4 bilaterally without bruits.  Lungs: CTA.  Cor: RRR. Normal S1 and S2.  No murmur, rub, or gallop.  PMI in Lf 5th ICS.  Abd: Soft, nontender, nondistended.  NABS.  No pulsatile mass.  Extremities: No C/C/E.  Pulses +4/4 symmetric in upper and lower extremities.  Neuro: Grossly intact.    LABS    Lab Results   Component Value Date    WBC 9.0 10/16/2020     Lab Results   Component Value Date    RBC 5.13 10/16/2020     Lab Results   Component Value Date    HGB 16.7 10/16/2020     Lab  Results   Component Value Date    HCT 48.2 10/16/2020     No components found for: MCT  Lab Results   Component Value Date    MCV 94 10/16/2020     Lab Results   Component Value Date    MCH 32.6 10/16/2020     Lab Results   Component Value Date    MCHC 34.6 10/16/2020     Lab Results   Component Value Date    RDW 12.4 10/16/2020     Lab Results   Component Value Date     10/16/2020      Recent Labs   Lab Test 10/16/20  1250 09/12/20  0603    142   POTASSIUM 4.3 4.4   CHLORIDE 105 113*   CO2 30 28   ANIONGAP 3 1*   GLC 90 119*   BUN 16 13   CR 0.96 0.92   WESLEY 8.9 8.4*       MARCOS Xavier MD

## 2021-09-08 ENCOUNTER — LAB (OUTPATIENT)
Dept: LAB | Facility: CLINIC | Age: 66
End: 2021-09-08
Payer: COMMERCIAL

## 2021-09-08 DIAGNOSIS — I10 BENIGN ESSENTIAL HYPERTENSION: ICD-10-CM

## 2021-09-08 DIAGNOSIS — I48.3 TYPICAL ATRIAL FLUTTER (H): ICD-10-CM

## 2021-09-08 LAB
ANION GAP SERPL CALCULATED.3IONS-SCNC: 3 MMOL/L (ref 3–14)
BUN SERPL-MCNC: 16 MG/DL (ref 7–30)
CALCIUM SERPL-MCNC: 8.5 MG/DL (ref 8.5–10.1)
CHLORIDE BLD-SCNC: 107 MMOL/L (ref 94–109)
CHOLEST SERPL-MCNC: 213 MG/DL
CO2 SERPL-SCNC: 28 MMOL/L (ref 20–32)
CREAT SERPL-MCNC: 0.93 MG/DL (ref 0.66–1.25)
GFR SERPL CREATININE-BSD FRML MDRD: 86 ML/MIN/1.73M2
GLUCOSE BLD-MCNC: 103 MG/DL (ref 70–99)
HDLC SERPL-MCNC: 59 MG/DL
LDLC SERPL CALC-MCNC: 144 MG/DL
NONHDLC SERPL-MCNC: 154 MG/DL
POTASSIUM BLD-SCNC: 4.3 MMOL/L (ref 3.4–5.3)
SODIUM SERPL-SCNC: 138 MMOL/L (ref 133–144)
TRIGL SERPL-MCNC: 51 MG/DL

## 2021-09-08 PROCEDURE — 80061 LIPID PANEL: CPT | Performed by: INTERNAL MEDICINE

## 2021-09-08 PROCEDURE — 36415 COLL VENOUS BLD VENIPUNCTURE: CPT | Performed by: INTERNAL MEDICINE

## 2021-09-08 PROCEDURE — 80048 BASIC METABOLIC PNL TOTAL CA: CPT | Performed by: INTERNAL MEDICINE

## 2021-09-15 NOTE — RESULT ENCOUNTER NOTE
Predominant rhythm sinus  with avg HR 72; PAFib (<1% burden) with longest episode lasting 67 seconds at an avg rate of 118; no symptoms reported. Pt not on AC. Follow up with Pili Hurst NP on 9/29/21. Pt notified of results via his [employer] account

## 2021-09-26 ENCOUNTER — HEALTH MAINTENANCE LETTER (OUTPATIENT)
Age: 66
End: 2021-09-26

## 2021-09-29 ENCOUNTER — OFFICE VISIT (OUTPATIENT)
Dept: CARDIOLOGY | Facility: CLINIC | Age: 66
End: 2021-09-29
Attending: INTERNAL MEDICINE
Payer: COMMERCIAL

## 2021-09-29 ENCOUNTER — TELEPHONE (OUTPATIENT)
Dept: CARDIOLOGY | Facility: CLINIC | Age: 66
End: 2021-09-29

## 2021-09-29 VITALS
HEART RATE: 64 BPM | DIASTOLIC BLOOD PRESSURE: 72 MMHG | SYSTOLIC BLOOD PRESSURE: 136 MMHG | WEIGHT: 192.4 LBS | OXYGEN SATURATION: 98 % | BODY MASS INDEX: 29.25 KG/M2

## 2021-09-29 DIAGNOSIS — R42 LIGHTHEADEDNESS: ICD-10-CM

## 2021-09-29 DIAGNOSIS — E78.5 HYPERLIPIDEMIA LDL GOAL <100: ICD-10-CM

## 2021-09-29 DIAGNOSIS — I48.0 PAROXYSMAL ATRIAL FIBRILLATION (H): Primary | ICD-10-CM

## 2021-09-29 DIAGNOSIS — I48.3 TYPICAL ATRIAL FLUTTER (H): ICD-10-CM

## 2021-09-29 DIAGNOSIS — I10 BENIGN ESSENTIAL HYPERTENSION: ICD-10-CM

## 2021-09-29 PROCEDURE — 99215 OFFICE O/P EST HI 40 MIN: CPT | Performed by: NURSE PRACTITIONER

## 2021-09-29 NOTE — LETTER
9/29/2021    [facility]  [address] [facility] [address]    RE: Quique Lyons       Dear Colleague,    I had the pleasure of seeing Quique Lyons in the [facility].    Cardiology Clinic Progress Note  Quique Lyons MRN# [id number]   YOB: 1955 Age: 66 year old      Primary Cardiologist:   Dr. Seth/ Dr. Xavier           History of Presenting Illness:      Quique Lyons is a pleasant 66 year old patient with a past cardiac history significant for   1. Atrial flutter s/p ablation   2. Hypertension  Past medical history significant for current tobacco abuse, sleep apnea using CPAP.  He is a monsalve and is .    He was hospitalized in September 2020 with lightheadedness and found to be in atrial flutter with RVR. Echocardiogram showed low normal EF 50 to 55%, possible PFO, and no significant valvular disease.  He converted to sinus rhythm with IV diltiazem.  He was not started on anticoagulation given his [id number] score of 1.  He later underwent flutter ablation in October 2020 which was successful.  He went into a short run of atrial fibrillation after the ablation which was self-limiting with no known recurrence.  He subsequently was diagnosed with obstructive sleep apnea and was started on CPAP.     Patient was last seen by Dr. Xavier in August 2021.  He noted a cough after being on lisinopril.  This was changed to losartan.  He continued with lightheadedness every 3 to 4 days which was not positional.  Zio patch was recommended to further evaluate this.    Pt presents today for testing follow-up.  1 week Zio patch showing showing sinus rhythm with average heart rate 72 bpm, less than 1% burden of paroxysmal atrial fibrillation the longest lasting 67 seconds at a rate of 118, and no symptoms reported.  Lipid profile 9/8/2021 showing total cholesterol 213 HDL 59  and triglycerides 51.  BMP showing normal  renal function and electrolytes.  Results reviewed today.    He denies any side effects with losartan and did not notice any improvement in his cough after switching to this.  I did recommend follow-up with PCP for the cough since it has been ongoing.  Blood pressure today is mildly elevated at 136/72.  He has not been checking this at home but will start.  We will then recheck at follow-up.  He actually did have lightheadedness during his Zio patch and this correlated with sinus rhythm.  Most of his atrial fibrillation occurred while he was sleeping.  He denies any vertiginous feeling and notes this is more of a lightheaded feeling.  It generally lasts for just a few seconds.  He continues having this about 1-2 times a week and is not related to position changes.  He is agreeable to carotid ultrasound.  He also was noted to have suspected PFO on echo last year.  I will check with Dr. Xaveir to see if it is reasonable to check a bubble study.  Given his elevated lipids, he would prefer lifestyle modification before starting medication.  We reviewed 150 minutes of exercise per week and Mediterranean-style diet.  He notes a history of chest discomfort which occurs rarely.  Could consider stress test in the future, if needed.  I also recommended checking his heart rates with blood pressure monitor and if staying higher than 100, let us know. Patient reports no shortness of breath, PND, orthopnea, presyncope, syncope, edema, heart racing, or palpitations.      ADDENDUM: discussed with Dr. Brar, do not need to do bubble study. Would likely not close a PFO for lightheadedness.     Current Cardiac Medications   Losartan 25 mg daily                    Assessment and Plan:       Plan  Patient Instructions   Medication Changes:  None     Recommendations:  1. Check blood pressure at least 1 hour after medications. Call the clinic if your blood pressure is consistently greater than 130/80.   2. We will let you know if you need  an echo   3. 150 min of exercise per week     Follow-up:   Follow-up with primary care about cough   Call to schedule carotid ultrasound   See Pili NIELSEN for cardiology follow up at [facility]: 1-2 months  Recheck Fasting lab in 6 months (lipid/ALT)      Cardiology Scheduling~[phone]  Cardiology Clinic RN~[phone] (Lexis Singletary, DIANA)            1. Typical atrial flutter    Hospitalized with typical atrial flutter RVR 9/11/2020 with conversion to sinus rhythm after IV diltiazem    Symptomatic with dizziness and presyncope    S/p flutter ablation 10/2020    Low normal EF    Chads VASC score of 1 for HTN    no need for anticoagulation at this time    Lightheadedness with diltiazem       2.  Hypertension    Controlled     Continue losartan     Cough with lisinopril       3.  Paroxysmal atrial fibrillation    self-limiting episode post flutter ablation    Zio patch 9/2021 with less than 1% burden longest lasting 67 seconds    [id number] score 1 for hypertension    No need for anticoagulation at this time given [id number] score less than 2 and low burden less than 1%      4.  Hyperlipidemia    Last  on 9/2021    Patient prefers lifestyle modification with Mediterranean-style diet and exercise, smoking cessation    Recheck in 6 months      5.  Lightheadedness    Occurring 1-2 times per week    denies vertiginous feeling, described as lightheaded, not positional    Zio patch, correlated with sinus rhythm and not paroxysmal A. Fib    We will obtain carotid ultrasound    Suspected PFO 9/2020-could consider bubble study in the future but likely would not close PFO for lightheadedness         Thank you for allowing me to participate in this delightful patient's care.      This note was completed in part using Dragon voice recognition software. Although reviewed after completion, some word and grammatical errors may occur.    Pili Dong, CRISTO CNP, APRN, CNP           Data:   All  laboratory data reviewed      Total time spent today was 53 mins, reviewing labs, testing, notes, documenting notes, and seeing patient.       Constitutional:  cooperative, alert and oriented, well developed, well nourished, in no acute distress  overweight    Skin:  warm and dry to the touch         Head:  normocephalic       Eyes:  pupils equal and round       ENT:  no pallor or cyanosis       Neck:  no stiffness       Respiratory:  clear to auscultation; normal symmetry        Cardiac: regular rate and rhythm; normal S1 and S2                 pulses full and equal     GI:  abdomen soft, nondistended     Extremities and Muscular Skeletal:  no edema        Neurological:  affect appropriate; no gross motor deficits       Psych:  Alert and Oriented x 3 , appropriate affact        Thank you for allowing me to participate in the care of your patient.      Sincerely,     CRISTO Boudreaux [facility]  cc:   Jeff Xavier MD  [address]  [place] [address]

## 2021-09-29 NOTE — PROGRESS NOTES
Cardiology Clinic Progress Note  Quique Lyons MRN# [id number]   YOB: 1955 Age: 66 year old      Primary Cardiologist:   Dr. Seth/ Dr. Xavier           History of Presenting Illness:      Quique Lyons is a pleasant 66 year old patient with a past cardiac history significant for   1. Atrial flutter s/p ablation   2. Hypertension  Past medical history significant for current tobacco abuse, sleep apnea using CPAP.  He is a monsalve and is .    He was hospitalized in September 2020 with lightheadedness and found to be in atrial flutter with RVR. Echocardiogram showed low normal EF 50 to 55%, possible PFO, and no significant valvular disease.  He converted to sinus rhythm with IV diltiazem.  He was not started on anticoagulation given his [id number] score of 1.  He later underwent flutter ablation in October 2020 which was successful.  He went into a short run of atrial fibrillation after the ablation which was self-limiting with no known recurrence.  He subsequently was diagnosed with obstructive sleep apnea and was started on CPAP.     Patient was last seen by Dr. Xavier in August 2021.  He noted a cough after being on lisinopril.  This was changed to losartan.  He continued with lightheadedness every 3 to 4 days which was not positional.  Zio patch was recommended to further evaluate this.    Pt presents today for testing follow-up.  1 week Zio patch showing showing sinus rhythm with average heart rate 72 bpm, less than 1% burden of paroxysmal atrial fibrillation the longest lasting 67 seconds at a rate of 118, and no symptoms reported.  Lipid profile 9/8/2021 showing total cholesterol 213 HDL 59  and triglycerides 51.  BMP showing normal renal function and electrolytes.  Results reviewed today.    He denies any side effects with losartan and did not notice any improvement in his cough after switching to this.  I did recommend follow-up with PCP for the cough since it has been ongoing.   Blood pressure today is mildly elevated at 136/72.  He has not been checking this at home but will start.  We will then recheck at follow-up.  He actually did have lightheadedness during his Zio patch and this correlated with sinus rhythm.  Most of his atrial fibrillation occurred while he was sleeping.  He denies any vertiginous feeling and notes this is more of a lightheaded feeling.  It generally lasts for just a few seconds.  He continues having this about 1-2 times a week and is not related to position changes.  He is agreeable to carotid ultrasound.  He also was noted to have suspected PFO on echo last year.  I will check with Dr. Xavier to see if it is reasonable to check a bubble study.  Given his elevated lipids, he would prefer lifestyle modification before starting medication.  We reviewed 150 minutes of exercise per week and Mediterranean-style diet.  He notes a history of chest discomfort which occurs rarely.  Could consider stress test in the future, if needed.  I also recommended checking his heart rates with blood pressure monitor and if staying higher than 100, let us know. Patient reports no shortness of breath, PND, orthopnea, presyncope, syncope, edema, heart racing, or palpitations.      ADDENDUM: discussed with Dr. Brar, do not need to do bubble study. Would likely not close a PFO for lightheadedness.     Current Cardiac Medications   Losartan 25 mg daily                    Assessment and Plan:       Plan  Patient Instructions   Medication Changes:  None     Recommendations:  1. Check blood pressure at least 1 hour after medications. Call the clinic if your blood pressure is consistently greater than 130/80.   2. We will let you know if you need an echo   3. 150 min of exercise per week     Follow-up:   Follow-up with primary care about cough   Call to schedule carotid ultrasound   See Pili NIELSEN for cardiology follow up at [place]: 1-2 months  Recheck Fasting lab in 6 months  (lipid/ALT)      Cardiology Scheduling~[phone]  Cardiology Clinic RN~[phone] (Lexis Singletary, DIANA)            1. Typical atrial flutter    Hospitalized with typical atrial flutter RVR 9/11/2020 with conversion to sinus rhythm after IV diltiazem    Symptomatic with dizziness and presyncope    S/p flutter ablation 10/2020    Low normal EF    Chads VASC score of 1 for HTN    no need for anticoagulation at this time    Lightheadedness with diltiazem       2.  Hypertension    Controlled     Continue losartan     Cough with lisinopril       3.  Paroxysmal atrial fibrillation    self-limiting episode post flutter ablation    Zio patch 9/2021 with less than 1% burden longest lasting 67 seconds    [id number] score 1 for hypertension    No need for anticoagulation at this time given [id number] score less than 2 and low burden less than 1%      4.  Hyperlipidemia    Last  on 9/2021    Patient prefers lifestyle modification with Mediterranean-style diet and exercise, smoking cessation    Recheck in 6 months      5.  Lightheadedness    Occurring 1-2 times per week    denies vertiginous feeling, described as lightheaded, not positional    Zio patch, correlated with sinus rhythm and not paroxysmal A. Fib    We will obtain carotid ultrasound    Suspected PFO 9/2020-could consider bubble study in the future but likely would not close PFO for lightheadedness         Thank you for allowing me to participate in this delightful patient's care.      This note was completed in part using Dragon voice recognition software. Although reviewed after completion, some word and grammatical errors may occur.    Pili Dong, APRN CNP, APRN, CNP           Data:   All laboratory data reviewed      Total time spent today was 53 mins, reviewing labs, testing, notes, documenting notes, and seeing patient.       Constitutional:  cooperative, alert and oriented, well developed, well nourished, in no acute distress   overweight    Skin:  warm and dry to the touch         Head:  normocephalic       Eyes:  pupils equal and round       ENT:  no pallor or cyanosis       Neck:  no stiffness       Respiratory:  clear to auscultation; normal symmetry        Cardiac: regular rate and rhythm; normal S1 and S2                 pulses full and equal     GI:  abdomen soft, nondistended     Extremities and Muscular Skeletal:  no edema        Neurological:  affect appropriate; no gross motor deficits       Psych:  Alert and Oriented x 3 , appropriate affact

## 2021-09-29 NOTE — PATIENT INSTRUCTIONS
Medication Changes:  None     Recommendations:  1. Check blood pressure at least 1 hour after medications. Call the clinic if your blood pressure is consistently greater than 130/80.   2. We will let you know if you need an echo   3. 150 min of exercise per week     Follow-up:   Follow-up with primary care about cough   Call to schedule carotid ultrasound   See Pili NIELSEN for cardiology follow up at [place]: 1-2 months  Recheck Fasting lab in 6 months (lipid/ALT)      Cardiology Scheduling~[phone]  Cardiology Clinic RN~[phone] (Lexis Singletary, DIANA)      Mediterranean Diet and Exercise information given to you today. Please review and implement.    Mediterranean Diet  A heart healthy eating plan  The Mediterranean Diet is based on the eating habits of people in countries near the [place]. People living in this part of the world have long lives  and low rates of chronic diseases. They have lower rates of death from heart disease, cancer and other illnesses.  When you follow this eating plan you ll have better control of your blood sugar and weight. The plan requires simple changes in your habits of eating, and the whole family can take part.  Mediterranean Lifestyle  Enjoy eating with others. Sit at the table with family and friends and enjoy your meal. When you eat slowly, you are able to tune in to your body s hunger  and fullness signals. You re less likely to overeat.  Be physically active: Being active every day is important for overall good health. Run, walk, dance and do lighter activities such as house and yard  work. Move more and sit less!  Drink plenty of water during the day.  Drink red wine with meals in modest amounts (optional).  The Mediterranean Pyramid  The pyramid shows the food groups and the amounts eaten in relation to the whole diet. It is more than a diet; it is also a life-style plan.  The largest food group at bottom contains plant foods: vegetables, fruits, grains,  nuts, legumes, seeds, olives and olive oil. These foods make up the largest  part of your meals.  The next groups above: fish and seafood, then poultry, cheese, eggs and yogurt are eaten less often and in smaller servings.  The top group, meats and sweets, are eaten the least often and in the smallest amounts.  Tips for adding plant foods to your meals  Vegetables and fruits:    Aim for 3 to 8 servings each day. A serving is   to 2 cups, depending on the food.    Choose a variety of colors. Big green salads are a great way to include several vegetable servings.    Try fresh fruit as a dessert: oranges, grapes, apples pomegranates or fresh figs.    At home keep fresh fruit in a bowl to tempt family.    Bring fruit and vegetables to work for a snack.  Oil    Replace butter and margarine with healthy fats such as olive oil. Other plant-based oils are canola, walnut and peanut oil. These are high in good fats. Use them in cooking, salad dressings and baking.    Drizzle your bread with olive oil instead of butter or margarine. Use herbs and spices to add flavor and aroma and to reduce fat and salt when cooking.  Whole grains    Look for the term  whole  or  whole grain  on the package. Processing grains removes vitamins, minerals and fiber. Whole grains may include: corn, wheat, oats, rye, rice and barley.    Examples of Mediterranean grains include: barley, farro, buckwheat, bulgur, couscous, and wheatberries.    Slowly switch to a whole grain by using wholegrain blends of pastas and rice. Or mix whole grains with refined; for example, mix wholewheat pasta with white pasta.  Beans and legumes    Beans are a good source of protein and fiber, adding flavor and texture to dishes. Examples include cannellini beans, chickpea, kylie beans, green beans, kidney beans, lentils and split peas.    Cook a vegetarian meal one night a week: use beans or legumes with vegetables and grains.    Nuts are high in healthy fats. Try walnuts,  almonds, pine nuts, hazelnuts and cashews. Avoid candied, honey-roasted and heavily salted nuts.    Limit your intake of nuts to a small handful each day. Explore ways to add to nuts to salads and other dishes.  Tips for using fish and seafood in your meals    Aim for meals with fish or shellfish at least twice a week.    Tuna, herring, salmon and sardines are rich in heart-healthy omega-3. Shellfish, such as mussels, oysters and clams, have similar benefits for brain and heart health.  Tips for using poultry, eggs and cheese and yogurt in your meals    Eat poultry or eggs at least twice a week.    Roast, broil or grill your poultry. Season with fresh or dried herbs.    Enjoy low-fat cheese or yogurt every day.  Tips for using red meat and sweets in your meals    Limit lean red meat to one time a week or 4 times a month.    Red meat has more saturated fats. Choose a lean cut, like top loin, sirloin, flank steak, strip steak or 90% lean ground beef.    Limit portions to 3 to 4 ounces.    Make whole grains and vegetables the main focus of a meal. Add meat in small amounts for flavor.    Limit sweets such as ice cream or cookies for a special times or holidays.  Snacks    Snack on a handful of almonds, walnuts or sunflower seeds in place of chips, cookies or other processed snack foods.    Calcium-rich, low-fat cheese or low- and nonfat plain yogurt with fresh fruit are healthy snacks that are easy to take with you.  Like to know more?  For tips on shoppping, cooking and eating well the Mediterranean way, go to the [employer] website at www.[employer].  For informational purposes only. Not to replace the advice of your health care provider.  Copyright   2014 [employer]. All rights reserved.  Mediterranean pyramrid used with permission of the [employer]. [employer] 602080 - 07/14.  Www.[employer]

## 2021-10-01 NOTE — TELEPHONE ENCOUNTER
Called Pt informed him no bubble study echo needed. Reviewed PFO, and discussed carotid US ordered, and F/U with NP in 1-2 months Pt understood. Pt still has cough in morning has not yet seen PMD. ARELI Richardson RN

## 2021-10-12 ENCOUNTER — MYC MEDICAL ADVICE (OUTPATIENT)
Dept: FAMILY MEDICINE | Facility: CLINIC | Age: 66
End: 2021-10-12
Payer: COMMERCIAL

## 2021-10-13 ENCOUNTER — MYC MEDICAL ADVICE (OUTPATIENT)
Dept: FAMILY MEDICINE | Facility: CLINIC | Age: 66
End: 2021-10-13

## 2021-10-13 ENCOUNTER — E-VISIT (OUTPATIENT)
Dept: FAMILY MEDICINE | Facility: CLINIC | Age: 66
End: 2021-10-13
Payer: COMMERCIAL
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
## 2021-10-13 PROCEDURE — 99421 OL DIG E/M SVC 5-10 MIN: CPT | Performed by: PHYSICIAN ASSISTANT

## 2021-10-13 RX ORDER — DOXYCYCLINE 100 MG/1
200 CAPSULE ORAL DAILY
Qty: 2 CAPSULE | Refills: 0 | Status: SHIPPED | OUTPATIENT
Start: 2021-10-13 | End: 2021-10-14

## 2021-10-13 NOTE — PATIENT INSTRUCTIONS
Patient Education     Tick Facts    Ticks are small arachnids that feed on the blood of rodents, rabbits, birds, deer, dogs, and humans. Ticks don't fly and don't drop from trees. They climb to the tips of plants along trails and attach themselves to you as you brush against the plant. Ticks may also attach to you if you come in contact with an infested animal.   Staying away from ticks  These tips will help you stay away from ticks:     When walking in tick-infested areas, tuck your pants into your boots or socks, and tuck your shirt into your pants. Wear long-sleeve shirts and a hat for added protection.    Wear light-colored clothing so you can easily see any ticks that get on you.    Apply a tick repellent to pants, socks, and shoes.    Don't brush against the plants along trails.    Check your clothing and pets for ticks. Remove them to prevent bringing ticks into the house.    Shower soon after coming indoors and check yourself and your children at the end of each day when hiking through tick-infested areas. Don't forget to check in and around hair and ears, under the arms, inside the belly button, around the waist, between legs, and on the backs of knees. Check carefully. Ticks that transmit disease can be very small. Some can be as small as a pinhead.    Tick-proof your yard:  ? Mow grass often  ? Clear tall grasses and brush around homes and at the edge of lawns  ? Remove leaf litter  ? Put a barrier of wood chips or gravel between your lawn and any wooded areas near your lawn  Removing ticks  Removing ticks right away will reduce the chance of disease. Here are some tips for removing ticks:     If possible, have someone else remove the tick from you.    Protect your hands from exposure to the tick by using a tissue or rubber glove.    Ticks have hook-like barbs on their mouth, which they use to attach themselves. Use tweezers or small needle-nose pliers instead of your fingers when removing a tick. Grasp  the head as close to the skin as possible. Be careful not to squeeze the body. This would inject more fluid from the tick into your skin.    Pull gently and slowly away from skin until the mouth parts let go. If the tick doesn't let go, stop pulling. While holding the head with tweezers, slowly turn it 90 degrees. Then gently pull away from the skin again. This movement may unlock the tick's jaw and make it easier to remove.    Once you have removed the tick, look closely at the bite area. If you think there are still parts of the tick in your skin that you can't remove, contact your healthcare provider.    Wash your hands and the bite site with soap and water.  Don't:    Crush or squeeze the tick with the tweezers.    Jerk the tick.    Burn or prick the tick.    Try to suffocate the tick with petroleum jelly or nail polish.  Identifying ticks  Most tick bites are harmless. But some ticks carry diseases, such as Lyme disease, babesiosis, and [place] spotted fever. These can spread to people. Lyme disease is of greatest concern. It is carried by only 1 type of tick, the deer tick. Many public health departments can identify a tick to figure out if it is the type that causes Lyme disease. If this service is available in your area, bring the removed tick to the public health department for identification. The tick should be in a plastic bag that seals at the top, or in a jar. If you can't identify the tick and you were bitten in a part of the country where there is a risk of Lyme disease, contact your healthcare provider.   [employer] last reviewed this educational content on 8/1/2019 2000-2021 [employer]. All rights reserved. This information is not intended as a substitute for professional medical care. Always follow your healthcare professional's instructions.           Patient Education     Preventing Lyme Disease  Ticks are small and spider-like. They feed on the blood of animals and humans.  They can carry the germs (bacteria) that cause Lyme disease. The bacteria can pass to a person from a tick bite. It is not passed from person to person. Lyme disease can lead to serious health problems if it is not treated with antibiotics. The best way to prevent Lyme disease is to not be bitten by a tick.     The tick that carries Lyme disease is very small--about the size of a poppy seed.   Preventing tick bites  The disease is carried by the deer tick (blacklegged tick). Young ticks are most likely to carry the bacteria. They are very small, about the size of a poppy seed. They can be found on deer, rodents, and birds. Often the animal brushes the tick onto leaves or other plants as it runs through the woods. Then the tick lives in bushes, grasses, and dead leaves. The most active time of year for infected ticks depends on the part of the country. In the [place] and [place], they are more active from April to September. On the [place] and in the [place], they may be more active from December to February. But you can still be bitten at other times of the year. Below are tips for protecting yourself from tick bites.  Protect your body    Wear clothes that protect you. Clothing can help protect you from tick bites. Wear long pants and long sleeves in outdoor areas where ticks may live. Tuck your shirt into your pants. Tuck pant legs into your socks. And wear light colors. This helps you to easily see ticks on your clothes.    Use insect repellent. Spray insect repellent that has at least 20% DEET on your exposed skin. You can also use it on clothing, shoes, and camping gear. Don't get DEET on children s hands, mouth, or eyes. You can use products with permethrin on clothing, shoes, and camping gear. But don't spray permethrin on skin. It will cause a rash. Follow the directions on the package of the spray you use. For more information on bug sprays, visit the National Pesticide Information Center at  npic.[facility] [place].[facility].    Stay away from tick-infested areas. Don't brush against grasses, bushes, and other plants. Don't walk through dead leaves and other ground plants. Don't sit on fallen logs. And stay away from areas with a lot of deer and rodents.    Check yourself for ticks. After being outdoors, check your clothes and skin for ticks. Keep in mind that the ticks may be as small as a poppy seed. If needed, use a handheld mirror. Or use a full-length mirror. This can help you to see all of your skin. Check carefully around areas with hair. And check these areas well:  ? Scalp  ? Behind the ears  ? Armpits  ? Belly button  ? Waist  ? Groin  ? Backs of the knees    Use the clothes dryer. Put clothes or bedding into a clothes dryer for 1 hour at high heat. This has been shown to kill ticks.    Control ticks around your home    Create tick-free safety zones. Ticks that spread Lyme disease live in ground plants. Ticks crawl onto people from shrubs and grasses in and around wooded areas. Cut bushes and other plants away from the deck or patio and any child play areas. Keep all grasses cut short. Remove dead leaves and other dead plants. Don't put children s play equipment near wooded areas. Put wood chips or gravel on the ground between lawns and wooded areas.    Use pesticides. You can apply them yourself or hire a pest control expert. States have different regulations about pesticides. Ask your local health department for information.    Keep deer away. Deer often carry the ticks that can infect you with Lyme disease. Don't try to pet or feed deer. Ask your local garden center about deer-resistant plants. Ask your local health department about deer control in your area.    Prevent ticks on pets. Pets can bring ticks into your home. Use tick control medicine as advised by your . Check your pet for ticks after it comes indoors. Check carefully around the ears.    Ask about local tick-control methods. Some  [facility] have tick-control programs. Local health departments may be using methods that can help you control ticks at home.    If you have a tick  If you find a tick on your skin, don't panic. Most ticks don't carry Lyme disease. And the tick must be attached for 36 to 48 hours before it can infect you. If you find a tick on you, here s what to do:    If the tick is not yet attached to your skin, remove the tick with tweezers or a tissue. Flush it down the toilet. If you see a tick on your clothes, use a piece of tape to lift it off. Don't touch it with your bare hands.    If the tick is attached to your skin:  ? Carefully remove the tick with tweezers. If you don t have tweezers, use your fingers protected by a paper towel or a thin cloth. Grasp the tick as close to your skin as possible. Pull slowly and gently to remove the tick. The tick may not let go right away. Don't pull harder. Be patient. Keep trying gently. Don't twist the head or body as you pull. Don't crush or squeeze the body. This can release the bacteria into your body. Never use a hot match, petroleum jelly, or other products to remove a tick. (Call your healthcare provider right away if you can t remove the tick. Or if part of the tick stays in the skin.)  ? Wash your skin with soap and water after you remove the tick. This will help ensure you remove any bacteria.  ? If you can, save the tick in a tightly sealed glass or plastic container. Take it to your healthcare provider. He or she may be able to have someone tell for sure if it's the type of tick that spreads Lyme.  ? Call your healthcare provider and describe the bite and the tick. You may be asked to come in for an exam. You may be tested for Lyme. You may also be prescribed antibiotics to help prevent infection.  ? Over the next month, watch for the symptoms below, especially a rash at the site of the bite.  When to call your healthcare provider  Call your healthcare provider if you have any  symptoms of Lyme disease. Call even if you don t remember being bitten. Symptoms include:    A round, red rash (called a bull s-eye rash) at the site of the tick bite or another part of the body    Fever of 100.4 F (38  C) or higher, or as directed by your provider    Chills    Tiredness or body aches    Headache or a stiff neck    Joint pain and swelling (arthritis), especially in large joints such as the knees  To learn more    CDC, www.cdc.gov/lyme    American Lyme Disease Foundation, www.aldf.com    [employer] last reviewed this educational content on 6/1/2019 2000-2021 [employer]. All rights reserved. This information is not intended as a substitute for professional medical care. Always follow your healthcare professional's instructions.           Patient Education     Lyme Disease  Lyme disease is caused by bacteria. The infection is most often passed during the bite of a deer tick. The tick is very small, so many people with Lyme disease don't know they have been bitten. Tests for Lyme disease are not always accurate early in the disease. If the disease is suspected, treatment may start before testing confirms the infection. A long course of antibiotics is the standard treatment.  If untreated, Lyme disease can cause symptoms in many parts of the body that may worsen.    Early symptoms limited to a small area may appear within a few days to a month after the tick bite. These symptoms may include a round, red rash that sometimes looks like a bull's-eye target with darker outer ring and a darker center. There may fever, chills, fatigue, body aches, and headache. In time, the rash goes away, even without treatment. That doesn't mean the infection has gone away, however. In some cases, early local symptoms never develop.    Early body-wide symptoms may appear weeks to months after the bite. These can include rashes on the skin of various parts of the body, muscle aches, fatigue, fever, headache,  stiff neck, weakness on one side of the face, dizziness, palpitations, passing out, and joint pain and swelling.    Late-stage symptoms can include weakness in an arm, or leg, headache, fever, and numbness and tingling in the arms or legs, joint pain and swelling, confusion, and memory loss.    Many people will have left over symptoms even after treatment and cure of the Lyme disease. These are called post-Lyme symptoms and may include fatigue, body aches, joint aches, and headaches, which generally improve with time. Repeated courses of antibiotics don't help these symptoms to resolve faster. And, having the symptoms after a course of treatment does not mean that the Lyme bacteria is still active in the body.  Testing is done to check for the bacteria. When the infection is treated early, it can be cured. In some cases, a second or third course of antibiotics may be needed. Be sure to follow your healthcare providers directions about treatment.  Home care  If antibiotic pills have been prescribed, take them exactly as directed until they are completely gone. Don't stop taking them until you have taken the full course or your healthcare provider has told you to stop.  Ask your healthcare provider about taking over-the-counter medicines to control symptoms such as aches and fever.  Follow-up care  Follow up with your healthcare provider, or as advised. Be sure to return for follow-up testing as directed to be sure the infection has been treated.  When to seek medical advice  Call your healthcare provider right away if any of the following occur:    Current symptoms get worse    Unexplained fever, neck pain or stiffness, or headache    Arm, leg or facial weakness    Joint pain or swelling    Numbness and tingling in the arms or legs    Confusion or memory loss    Irregular or rapid heartbeat, palpitations, dizziness, or passing out  [employer] last reviewed this educational content on 3/1/2018    5147-9690 [employer]. All rights reserved. This information is not intended as a substitute for professional medical care. Always follow your healthcare professional's instructions.

## 2021-10-21 ENCOUNTER — OFFICE VISIT (OUTPATIENT)
Dept: FAMILY MEDICINE | Facility: CLINIC | Age: 66
End: 2021-10-21
Payer: COMMERCIAL

## 2021-10-21 ENCOUNTER — ANCILLARY PROCEDURE (OUTPATIENT)
Dept: GENERAL RADIOLOGY | Facility: CLINIC | Age: 66
End: 2021-10-21
Attending: PHYSICIAN ASSISTANT
Payer: COMMERCIAL

## 2021-10-21 VITALS
HEART RATE: 78 BPM | SYSTOLIC BLOOD PRESSURE: 136 MMHG | TEMPERATURE: 97.6 F | BODY MASS INDEX: 29.4 KG/M2 | HEIGHT: 68 IN | RESPIRATION RATE: 16 BRPM | WEIGHT: 194 LBS | DIASTOLIC BLOOD PRESSURE: 80 MMHG | OXYGEN SATURATION: 98 %

## 2021-10-21 DIAGNOSIS — I10 HYPERTENSION GOAL BP (BLOOD PRESSURE) < 140/90: Primary | ICD-10-CM

## 2021-10-21 DIAGNOSIS — Z72.0 TOBACCO ABUSE: ICD-10-CM

## 2021-10-21 DIAGNOSIS — I48.0 PAF (PAROXYSMAL ATRIAL FIBRILLATION) (H): ICD-10-CM

## 2021-10-21 DIAGNOSIS — Z12.12 SCREENING FOR MALIGNANT NEOPLASM OF THE RECTUM: ICD-10-CM

## 2021-10-21 DIAGNOSIS — R05.9 COUGH: ICD-10-CM

## 2021-10-21 PROCEDURE — 99214 OFFICE O/P EST MOD 30 MIN: CPT | Performed by: PHYSICIAN ASSISTANT

## 2021-10-21 PROCEDURE — 71046 X-RAY EXAM CHEST 2 VIEWS: CPT | Performed by: RADIOLOGY

## 2021-10-21 RX ORDER — BUPROPION HYDROCHLORIDE 150 MG/1
150 TABLET, FILM COATED, EXTENDED RELEASE ORAL 2 TIMES DAILY
Qty: 180 TABLET | Refills: 1 | Status: SHIPPED | OUTPATIENT
Start: 2021-10-21 | End: 2022-02-18

## 2021-10-21 RX ORDER — TIOTROPIUM BROMIDE 18 UG/1
18 CAPSULE ORAL; RESPIRATORY (INHALATION) DAILY
Qty: 90 CAPSULE | Refills: 0 | Status: SHIPPED | OUTPATIENT
Start: 2021-10-21 | End: 2021-10-21

## 2021-10-21 RX ORDER — ALBUTEROL SULFATE 90 UG/1
2 AEROSOL, METERED RESPIRATORY (INHALATION) EVERY 6 HOURS
Qty: 1 EACH | Refills: 0 | Status: SHIPPED | OUTPATIENT
Start: 2021-10-21 | End: 2021-11-15

## 2021-10-21 ASSESSMENT — PAIN SCALES - GENERAL: PAINLEVEL: NO PAIN (0)

## 2021-10-21 ASSESSMENT — MIFFLIN-ST. JEOR: SCORE: 1644.48

## 2021-10-21 NOTE — PROGRESS NOTES
"  Assessment & Plan       ICD-10-CM    1. Hypertension goal BP (blood pressure) < 140/90  I10    2. PAF (paroxysmal atrial fibrillation) (H)  I48.0    3. Cough  R05.9 XR Chest 2 Views     albuterol (PROAIR HFA/PROVENTIL HFA/VENTOLIN HFA) 108 (90 Base) MCG/ACT inhaler     umeclidinium (INCRUSE ELLIPTA) 62.5 MCG/INH inhaler     DISCONTINUED: tiotropium (SPIRIVA) 18 MCG inhaled capsule   4. Tobacco abuse  Z72.0 buPROPion (ZYBAN) 150 MG 12 hr tablet   5. Screening for malignant neoplasm of the rectum  Z12.12 Adult Gastro Ref - Procedure Only        Talk to patient on his concerns he will continue with care with cardiologist regarding his hypertension and A. fib.  He thinks he can start him on cholesterol management at his next appointment.    Regarding his cough I am suspecting this is more COPD due to Covid we are not able to spirometry or lung function testing.  We will coercion to stop smoking with Zyban and prescription was given.  We will get him started on Spiriva and albuterol.  We will recheck things in 4 weeks or sooner if needed.      Tobacco Cessation:   reports that he has been smoking. He started smoking about 48 years ago. He has been smoking about 1.00 pack per day. He has never used smokeless tobacco.  Tobacco Cessation Action Plan: Pharmacotherapies : Zyban/Wellbutrin    BMI:   Estimated body mass index is 29.5 kg/m  as calculated from the following:    Height as of this encounter: 1.727 m (5' 8\").    Weight as of this encounter: 88 kg (194 lb).   Weight management plan: Discussed healthy diet and exercise guidelines      No follow-ups on file.    Steven Adam PA-C  [facility] PARAG LAGUNAS is a 66 year old who presents for the following health issues     HPI     Hypertension Follow-up      Do you check your blood pressure regularly outside of the clinic? Yes     Are you following a low salt diet? Less salt     Are your blood pressures ever more than 140 on the top " "number (systolic) OR more   than 90 on the bottom number (diastolic), for example 140/90? Unsure  Is seeing cardiology for afib.  Recently diagnosed with sleep apnea and uses CPAP machine.    Cough x 1 year. Was thought to be from bp medication but medication was switched and cough continues. Cough is mainly in the morning. No other cold symptoms   Worse in a.m.   He denies any headaches dizziness any nasal congestion or sore throat.  Denies any heartburn symptoms.    Review of Systems   Constitutional, HEENT, cardiovascular, pulmonary, gi and gu systems are negative, except as otherwise noted.      Objective    /80   Pulse 78   Temp 97.6  F (36.4  C) (Tympanic)   Resp 16   Ht 1.727 m (5' 8\")   Wt 88 kg (194 lb)   SpO2 98%   BMI 29.50 kg/m    Body mass index is 29.5 kg/m .  Physical Exam   GENERAL: healthy, alert and no distress  EYES: Eyes grossly normal to inspection, PERRL and conjunctivae and sclerae normal  HENT: ear canals and TM's normal, nose and mouth without ulcers or lesions  NECK: no adenopathy, no asymmetry, masses, or scars and thyroid normal to palpation  RESP: lungs clear to auscultation - no rales, rhonchi or wheezes  CV: regular rate and rhythm, normal S1 S2, no S3 or S4, no murmur, click or rub, no peripheral edema and peripheral pulses strong  ABDOMEN: soft, nontender, no hepatosplenomegaly, no masses and bowel sounds normal  MS: no gross musculoskeletal defects noted, no edema  SKIN: no suspicious lesions or rashes  NEURO: Normal strength and tone, mentation intact and speech normal  PSYCH: mentation appears normal, affect normal/bright    CXR - Reviewed and interpreted by me Normal- no infiltrates, effusions, pneumothoraces, cardiomegaly or masses  pending radiology.           The 10-year ASCVD risk score (Geoffrey DC Jr., et al., 2013) is: 20.2%    Values used to calculate the score:      Age: 66 years      Sex: Male      Is Non- : No      Diabetic: No      " Tobacco smoker: Yes      Systolic Blood Pressure: 136 mmHg      Is BP treated: Yes      HDL Cholesterol: 59 mg/dL      Total Cholesterol: 213 mg/dL

## 2021-11-04 ENCOUNTER — HOSPITAL ENCOUNTER (OUTPATIENT)
Dept: CARDIOLOGY | Facility: CLINIC | Age: 66
Discharge: HOME | End: 2021-11-04
Attending: NURSE PRACTITIONER | Admitting: NURSE PRACTITIONER
Payer: COMMERCIAL

## 2021-11-04 DIAGNOSIS — R42 LIGHTHEADEDNESS: ICD-10-CM

## 2021-11-04 PROCEDURE — 93880 EXTRACRANIAL BILAT STUDY: CPT | Mod: 26 | Performed by: INTERNAL MEDICINE

## 2021-11-04 PROCEDURE — 93880 EXTRACRANIAL BILAT STUDY: CPT

## 2021-11-09 NOTE — TELEPHONE ENCOUNTER
Patient was seen by Steven Adam PA-C, 10/21/21 and did Evisit 10/13/21.     Patient had CXR that has not yet been resulted. 10/21/21  Please advise.     Kelsey Wagner RN

## 2021-11-10 NOTE — TELEPHONE ENCOUNTER
My chart records show he has seen the x-ray.   He will need follow up  In 2-4 wks for recheck cough.    Steven Adam PA-C

## 2021-11-11 ENCOUNTER — OFFICE VISIT (OUTPATIENT)
Dept: CARDIOLOGY | Facility: CLINIC | Age: 66
End: 2021-11-11
Attending: NURSE PRACTITIONER
Payer: COMMERCIAL

## 2021-11-11 VITALS
DIASTOLIC BLOOD PRESSURE: 71 MMHG | SYSTOLIC BLOOD PRESSURE: 149 MMHG | HEART RATE: 65 BPM | WEIGHT: 194 LBS | BODY MASS INDEX: 29.5 KG/M2

## 2021-11-11 DIAGNOSIS — I48.0 PAROXYSMAL ATRIAL FIBRILLATION (H): ICD-10-CM

## 2021-11-11 DIAGNOSIS — R42 LIGHTHEADEDNESS: Primary | ICD-10-CM

## 2021-11-11 DIAGNOSIS — I48.3 TYPICAL ATRIAL FLUTTER (H): ICD-10-CM

## 2021-11-11 DIAGNOSIS — I10 BENIGN ESSENTIAL HYPERTENSION: ICD-10-CM

## 2021-11-11 PROCEDURE — 99214 OFFICE O/P EST MOD 30 MIN: CPT | Performed by: NURSE PRACTITIONER

## 2021-11-11 RX ORDER — LOSARTAN POTASSIUM 25 MG/1
25 TABLET ORAL DAILY
Qty: 90 TABLET | Refills: 3 | Status: CANCELLED | OUTPATIENT
Start: 2021-11-11

## 2021-11-11 NOTE — PROGRESS NOTES
Cardiology Clinic Progress Note  Quique Lyons MRN# [id number]   YOB: 1955 Age: 66 year old      Primary Cardiologist:   Dr. Seth/ Dr. Xavier           History of Presenting Illness:      Quique Lyons is a pleasant 66 year old patient with a past cardiac history significant for   1. Atrial flutter s/p ablation   2. Hypertension  3. PFO   Past medical history significant for current tobacco abuse, sleep apnea using CPAP.  He is a monsalve and is .    He was hospitalized in September 2020 with lightheadedness and found to be in atrial flutter with RVR. Echocardiogram showed low normal EF 50 to 55%, possible PFO, and no significant valvular disease.  He converted to sinus rhythm with IV diltiazem.  He was not started on anticoagulation given his [id number] score of 1.  He later underwent flutter ablation in October 2020 which was successful.  He went into a short run of atrial fibrillation after the ablation which was self-limiting with no known recurrence.  He subsequently was diagnosed with obstructive sleep apnea and was started on CPAP.     Patient saw Dr. Xavier in August 2021.  He noted a cough after being on lisinopril and was changed to losartan.  He continued with lightheadedness every 3 to 4 days which was not positional.  Zio patch was recommended to further evaluate this.    Patient was seen by me in September 2021 for testing follow-up. 1 week Zio patch showing showing sinus rhythm with average heart rate 72 bpm, less than 1% burden of paroxysmal atrial fibrillation the longest lasting 67 seconds at a rate of 118, and no symptoms reported.  His lightheadedness correlated with sinus rhythm.  Most of his atrial fibrillation actually occurred while he was sleeping.  He noted that his lightheadedness lasts for only a few seconds and occur 1-2 times per week, not related to position changes.  I recommended carotid ultrasound.  He had been noted to have suspected PFO on prior echo.  I  discussed this with Dr. Brar who felt there was not a need to do a bubble study as they would likely not close PFO for symptoms of lightheadedness.  Lipids were elevated but she preferred lifestyle modification prior to medication changes.  Recommended follow-up with PCP for ongoing cough.    Pt presents today for 1 month follow-up.  He had his carotid ultrasound performed on 11/4/2021 but unfortunately the results are still in process.  We will call him with these results when we get them.  Blood pressure today is mildly elevated but he reports controlled blood pressures at home.  He does have a follow-up with PCP in 2 weeks where this can be reassessed.  He notes that his lightheadedness improved, spontaneously.  He has seen PCP for his cough and has follow-up in a couple weeks.  He denies any heart rates greater than 100 or any anginal symptoms.  He will continue working on diet and exercise for hyperlipidemia. Patient reports no shortness of breath, chest pain, PND, orthopnea, presyncope, syncope, edema, heart racing, or palpitations.     Current Cardiac Medications   Losartan 25 mg daily                    Assessment and Plan:       Plan    Patient Instructions   Medication Changes:  None     Recommendations:  1. Check blood pressure at least 1 hour after medications. Call the clinic if your blood pressure is consistently greater than 130/80.   2. Call if heart rates are staying higher than 100.     Follow-up:  Call to schedule Fasting lab in March (lipid/ALT)  See Dr. Xavier  for cardiology follow up at [facility]: May 2022. Call in Jan to schedule.     Cardiology Scheduling~[phone]  Cardiology Clinic RN~[phone] (Lexis Singletary RN)        1. Typical atrial flutter    Hospitalized with typical atrial flutter RVR 9/11/2020 with conversion to sinus rhythm after IV diltiazem    Symptomatic with dizziness and presyncope    S/p flutter ablation 10/2020    Low normal EF    Chads VASC score of 1 for  HTN    no need for anticoagulation at this time    Lightheadedness with diltiazem       2.  Hypertension    Controlled at home     Continue losartan     Cough with lisinopril       3.  Paroxysmal atrial fibrillation    self-limiting episode post flutter ablation    Zio patch 9/2021 with less than 1% burden longest lasting 67 seconds    [id number] score 1 for hypertension    No need for anticoagulation at this time given [id number] score less than 2 and low burden less than 1%      4.  Hyperlipidemia    Last  on 9/2021    Patient prefers lifestyle modification with Mediterranean-style diet and exercise, smoking cessation    Recheck in 6 months 3/2022      5.  Lightheadedness- improving     Occurring 1-2 times per week- improving spontaneously    denies vertiginous feeling, described as lightheaded, not positional    Zio patch, correlated with sinus rhythm and not paroxysmal A. Fib    carotid ultrasound no significant disease     Suspected PFO 9/2020- likely would not close PFO for lightheadedness         Thank you for allowing me to participate in this delightful patient's care.      This note was completed in part using Dragon voice recognition software. Although reviewed after completion, some word and grammatical errors may occur.    Pili Dong, APRN CNP, APRN, CNP           Data:   All laboratory data reviewed      Total time spent today was 35 mins, reviewing labs, testing, notes, documenting notes, and seeing patient.       Constitutional:  cooperative, alert and oriented, well developed, well nourished, in no acute distress  overweight    Skin:  warm and dry to the touch         Head:  normocephalic       Eyes:  pupils equal and round       ENT:  no pallor or cyanosis       Neck:  no stiffness       Respiratory:  clear to auscultation; normal symmetry, decreased sounds bilaterally   Cardiac: regular rate and rhythm; normal S1 and S2                 pulses full and equal     GI:   abdomen soft, nondistended     Extremities and Muscular Skeletal:  no edema        Neurological:  affect appropriate; no gross motor deficits       Psych:  Alert and Oriented x 3 , appropriate affact

## 2021-11-11 NOTE — PATIENT INSTRUCTIONS
Medication Changes:  None     Recommendations:  1. Check blood pressure at least 1 hour after medications. Call the clinic if your blood pressure is consistently greater than 130/80.   2. Call if heart rates are staying higher than 100.     Follow-up:  Call to schedule Fasting lab in March (lipid/ALT)  See Dr. Xavier  for cardiology follow up at [facility]: May 2022. Call in Jan to schedule.     Cardiology Scheduling~[phone]  Cardiology Clinic RN~[phone] (Lexis Singletary RN)

## 2021-11-11 NOTE — LETTER
11/11/2021    [facility]  [address] [address]    RE: Quique Lyons       Dear Colleague,    I had the pleasure of seeing Quique Lyons in the [facility].  Cardiology Clinic Progress Note  Quique Lyons MRN# [id number]   YOB: 1955 Age: 66 year old      Primary Cardiologist:   Dr. Seth/ Dr. Xavier           History of Presenting Illness:      Quique Lyons is a pleasant 66 year old patient with a past cardiac history significant for   1. Atrial flutter s/p ablation   2. Hypertension  3. PFO   Past medical history significant for current tobacco abuse, sleep apnea using CPAP.  He is a monsalve and is .    He was hospitalized in September 2020 with lightheadedness and found to be in atrial flutter with RVR. Echocardiogram showed low normal EF 50 to 55%, possible PFO, and no significant valvular disease.  He converted to sinus rhythm with IV diltiazem.  He was not started on anticoagulation given his [id number] score of 1.  He later underwent flutter ablation in October 2020 which was successful.  He went into a short run of atrial fibrillation after the ablation which was self-limiting with no known recurrence.  He subsequently was diagnosed with obstructive sleep apnea and was started on CPAP.     Patient saw Dr. Xavier in August 2021.  He noted a cough after being on lisinopril and was changed to losartan.  He continued with lightheadedness every 3 to 4 days which was not positional.  Zio patch was recommended to further evaluate this.    Patient was seen by me in September 2021 for testing follow-up. 1 week Zio patch showing showing sinus rhythm with average heart rate 72 bpm, less than 1% burden of paroxysmal atrial fibrillation the longest lasting 67 seconds at a rate of 118, and no symptoms reported.  His lightheadedness correlated with sinus rhythm.  Most of his atrial fibrillation actually occurred  while he was sleeping.  He noted that his lightheadedness lasts for only a few seconds and occur 1-2 times per week, not related to position changes.  I recommended carotid ultrasound.  He had been noted to have suspected PFO on prior echo.  I discussed this with Dr. Brar who felt there was not a need to do a bubble study as they would likely not close PFO for symptoms of lightheadedness.  Lipids were elevated but she preferred lifestyle modification prior to medication changes.  Recommended follow-up with PCP for ongoing cough.    Pt presents today for 1 month follow-up.  He had his carotid ultrasound performed on 11/4/2021 but unfortunately the results are still in process.  We will call him with these results when we get them.  Blood pressure today is mildly elevated but he reports controlled blood pressures at home.  He does have a follow-up with PCP in 2 weeks where this can be reassessed.  He notes that his lightheadedness improved, spontaneously.  He has seen PCP for his cough and has follow-up in a couple weeks.  He denies any heart rates greater than 100 or any anginal symptoms.  He will continue working on diet and exercise for hyperlipidemia. Patient reports no shortness of breath, chest pain, PND, orthopnea, presyncope, syncope, edema, heart racing, or palpitations.     Current Cardiac Medications   Losartan 25 mg daily                    Assessment and Plan:       Plan    Patient Instructions   Medication Changes:  None     Recommendations:  1. Check blood pressure at least 1 hour after medications. Call the clinic if your blood pressure is consistently greater than 130/80.   2. Call if heart rates are staying higher than 100.     Follow-up:  Call to schedule Fasting lab in March (lipid/ALT)  See Dr. Xavier  for cardiology follow up at [facility]: May 2022. Call in Jan to schedule.     Cardiology Scheduling~[phone]  Cardiology Clinic RN~[phone] (Lexis Singletary RN)        1. Typical atrial  flutter    Hospitalized with typical atrial flutter RVR 9/11/2020 with conversion to sinus rhythm after IV diltiazem    Symptomatic with dizziness and presyncope    S/p flutter ablation 10/2020    Low normal EF    Chads VASC score of 1 for HTN    no need for anticoagulation at this time    Lightheadedness with diltiazem       2.  Hypertension    Controlled at home     Continue losartan     Cough with lisinopril       3.  Paroxysmal atrial fibrillation    self-limiting episode post flutter ablation    Zio patch 9/2021 with less than 1% burden longest lasting 67 seconds    [id number] score 1 for hypertension    No need for anticoagulation at this time given [id number] score less than 2 and low burden less than 1%      4.  Hyperlipidemia    Last  on 9/2021    Patient prefers lifestyle modification with Mediterranean-style diet and exercise, smoking cessation    Recheck in 6 months 3/2022      5.  Lightheadedness- improving     Occurring 1-2 times per week- improving spontaneously    denies vertiginous feeling, described as lightheaded, not positional    Zio patch, correlated with sinus rhythm and not paroxysmal A. Fib    carotid ultrasound no significant disease     Suspected PFO 9/2020- likely would not close PFO for lightheadedness         Thank you for allowing me to participate in this delightful patient's care.      This note was completed in part using Dragon voice recognition software. Although reviewed after completion, some word and grammatical errors may occur.    Pili Dong, APRN CNP, APRN, CNP           Data:   All laboratory data reviewed      Total time spent today was 35 mins, reviewing labs, testing, notes, documenting notes, and seeing patient.       Constitutional:  cooperative, alert and oriented, well developed, well nourished, in no acute distress  overweight    Skin:  warm and dry to the touch         Head:  normocephalic       Eyes:  pupils equal and round        ENT:  no pallor or cyanosis       Neck:  no stiffness       Respiratory:  clear to auscultation; normal symmetry, decreased sounds bilaterally   Cardiac: regular rate and rhythm; normal S1 and S2                 pulses full and equal     GI:  abdomen soft, nondistended     Extremities and Muscular Skeletal:  no edema        Neurological:  affect appropriate; no gross motor deficits       Psych:  Alert and Oriented x 3 , appropriate affact        CRISTO Boudreaux [facility]

## 2021-11-15 DIAGNOSIS — R05.9 COUGH: ICD-10-CM

## 2021-11-17 RX ORDER — ALBUTEROL SULFATE 90 UG/1
2 AEROSOL, METERED RESPIRATORY (INHALATION) EVERY 6 HOURS
Qty: 8 G | Refills: 1 | Status: SHIPPED | OUTPATIENT
Start: 2021-11-17

## 2021-11-17 NOTE — TELEPHONE ENCOUNTER
Prescription approved per [facility] Refill Protocol.    Jil Valles RN  [facility]

## 2021-11-24 ENCOUNTER — OFFICE VISIT (OUTPATIENT)
Dept: FAMILY MEDICINE | Facility: CLINIC | Age: 66
End: 2021-11-24
Payer: COMMERCIAL

## 2021-11-24 VITALS
BODY MASS INDEX: 29.52 KG/M2 | RESPIRATION RATE: 16 BRPM | DIASTOLIC BLOOD PRESSURE: 75 MMHG | TEMPERATURE: 97.6 F | HEART RATE: 83 BPM | SYSTOLIC BLOOD PRESSURE: 136 MMHG | WEIGHT: 194.8 LBS | HEIGHT: 68 IN | OXYGEN SATURATION: 97 %

## 2021-11-24 DIAGNOSIS — R05.9 COUGH: Primary | ICD-10-CM

## 2021-11-24 PROCEDURE — 99213 OFFICE O/P EST LOW 20 MIN: CPT | Performed by: PHYSICIAN ASSISTANT

## 2021-11-24 ASSESSMENT — MIFFLIN-ST. JEOR: SCORE: 1648.11

## 2021-11-24 ASSESSMENT — PAIN SCALES - GENERAL: PAINLEVEL: NO PAIN (0)

## 2021-11-24 NOTE — PROGRESS NOTES
"  Assessment & Plan     .    ICD-10-CM    1. Cough  R05.9    Talk to patient about his concerns at this point his symptoms have resolved.  I would have him just hold off on using the inhalers.  Strongly encouraged him to quit smoking as he is high risk for COPD.  We did talk about if his cough comes back it is okay for him to start back on the inhalers and follow-up with me 4 weeks after that.    Return in about 4 weeks (around 12/22/2021) for recheck, As Needed.    Steven Adam PA-C  [facility]   BEBO is a 66 year old who presents for the following health issues     HPI     Follow up cough  - cough has gotten better   He states the day after he was seen at his last appointment his cough got much better.  He denies any chest pain or shortness of breath or any wheezing.  He had started using the inhalers that were prescribed about 5 days ago but has not noticed much change.      Review of Systems   Constitutional, HEENT, cardiovascular, pulmonary, gi and gu systems are negative, except as otherwise noted.      Objective    /75   Pulse 83   Temp 97.6  F (36.4  C) (Tympanic)   Resp 16   Ht 1.727 m (5' 8\")   Wt 88.4 kg (194 lb 12.8 oz)   SpO2 97%   BMI 29.62 kg/m    Body mass index is 29.62 kg/m .  Physical Exam   GENERAL: healthy, alert and no distress  RESP: lungs clear to auscultation - no rales, rhonchi or wheezes  CV: regular rate and rhythm, normal S1 S2, no S3 or S4, no murmur, click or rub, no peripheral edema and peripheral pulses strong  ABDOMEN: soft, nontender, no hepatosplenomegaly, no masses and bowel sounds normal  MS: no gross musculoskeletal defects noted, no edema                "

## 2021-12-22 DIAGNOSIS — R05.9 COUGH: ICD-10-CM

## 2022-01-16 ENCOUNTER — HEALTH MAINTENANCE LETTER (OUTPATIENT)
Age: 67
End: 2022-01-16

## 2022-01-25 NOTE — PROGRESS NOTES
Quique is a 66 year old who is being evaluated via a billable video visit.      How would you like to obtain your AVS? MyChart  If the video visit is dropped, the invitation should be resent by: Text to cell phone: [phone]  Will anyone else be joining your video visit? No      Video Start Time: 9:00 AM    Assessment & Plan       ICD-10-CM    1. Plantar fasciitis  M72.2 Orthopedic  Referral     Talk to patient through video visit regarding his concerns he is interested in following up with podiatry for second opinion I think this is reasonable.  Warning signs were discussed.  Follow-up as needed.    Return in about 1 week (around 2/2/2022) for recheck.    Steven Adam PA-C  [facility]   Quique is a 66 year old who presents for the following health issues     HPI     Concern - heel and ball of foot pain  Onset: 5-6 weeks  Description: heel and ball of foot pain  Intensity: moderate  Progression of Symptoms:  worsening  Accompanying Signs & Symptoms: none  Previous history of similar problem: none  Precipitating factors:        Worsened by: none  Alleviating factors:        Improved by: none  Therapies tried and outcome: Inserts in shoes  He has been working on stretching exercises that he found off the Internet and has not been helping him.  Tried new boot and inserts.    Review of Systems   Constitutional, HEENT, cardiovascular, pulmonary, gi and gu systems are negative, except as otherwise noted.      Objective           Vitals:  No vitals were obtained today due to virtual visit.    Physical Exam   GENERAL: Healthy, alert and no distress  EYES: Eyes grossly normal to inspection.  No discharge or erythema, or obvious scleral/conjunctival abnormalities.  RESP: No audible wheeze, cough, or visible cyanosis.  No visible retractions or increased work of breathing.    SKIN: Visible skin clear. No significant rash, abnormal pigmentation or lesions.  NEURO: Cranial  nerves grossly intact.  Mentation and speech appropriate for age.  PSYCH: Mentation appears normal, affect normal/bright, judgement and insight intact, normal speech and appearance well-groomed.          Video-Visit Details    Type of service:  Video Visit    Video End Time:9:08 AM    Originating Location (pt. Location): Home    Distant Location (provider location):  [facility]     Platform used for Video Visit: BandPage

## 2022-01-26 ENCOUNTER — VIRTUAL VISIT (OUTPATIENT)
Dept: FAMILY MEDICINE | Facility: CLINIC | Age: 67
End: 2022-01-26
Payer: COMMERCIAL

## 2022-01-26 DIAGNOSIS — M72.2 PLANTAR FASCIITIS: Primary | ICD-10-CM

## 2022-01-26 PROCEDURE — 99213 OFFICE O/P EST LOW 20 MIN: CPT | Mod: 95 | Performed by: PHYSICIAN ASSISTANT

## 2022-01-26 NOTE — PATIENT INSTRUCTIONS
Patient Education     Understanding Plantar Fasciitis    Plantar fasciitis is a condition that causes foot and heel pain. The plantar fascia is a tough band of tissue that runs across the bottom of the foot from the heel to the toes. This tissue pulls on the heel bone. It supports the arch of the foot as it pushes off the ground. If the tissue becomes irritated or red and swollen (inflamed), it is called plantar fasciitis.    How to say it  PLAN-tar fa-shee-EYE-[facility]   What causes plantar fasciitis?  Plantar fasciitis most often occurs from overusing the plantar fascia. The tissue may become damaged from activities that put repeated stress on the heel and foot. Or it may wear down over time with age and ankle stiffness. You are more likely to have plantar fasciitis if you:     Do activities that require a lot of running, jumping, or dancing    Have new or increase activity    Have a job that requires being on your feet for long periods    Are overweight or obese    Have certain foot problems, such as a tight Achilles tendon, flat feet, or high arches    Often wear poorly fitting shoes  Symptoms of plantar fasciitis  The condition most often causes pain in the heel and the bottom of the foot. The pain may occur when you take your first steps in the morning. It may get better as you walk throughout the day. But as you continue to put weight on the foot, the pain often returns. Pain may also occur after standing or sitting for long periods.   Treating plantar fasciitis  Treatments for plantar fasciitis include:    Resting the foot. This involves limiting movements that make your foot hurt. You may also need to avoid certain sports and types of work for a time.    Using cold packs. Put an ice pack (wrapped in a thin towel) on the heel and foot to help reduce pain and swelling.    Taking medicines. Prescription and over-the-counter medicines can help relieve pain and swelling. NSAIDs (nonsteroidal anti-inflammatory  drugs) are the most common medicines used. They may be given as pills. Or they may be put on the skin as a gel, cream, or patch.    Using heel cups or foot inserts (orthotics). These are placed in the shoes to help support the heel or arch and cushion the heel. You may also be advised to buy proper-fitting shoes with good arch support and cushioned soles.    Taping the foot. This supports the arch and limits the movement of the plantar fascia to help ease symptoms.    Wearing a night splint. This stretches the plantar fascia and leg muscles while you sleep. This may help ease pain.    Doing exercises and physical therapy. These stretch and strengthen the plantar fascia and the muscles in the leg that support the heel and foot. Stretching your calf and plantar fascia is the most effective way to relieve pain.    Getting shots of medicine into the foot. These may help ease symptoms for a time. The shots often contain corticosteroids. These are strong anti-inflammatory medicines.    Having surgery. This may be needed if other treatments fail to relieve symptoms. During surgery, the surgeon may partially cut the plantar fascia to release tension.  Possible complications of plantar fasciitis  Without proper care and treatment, healing may take longer than normal. Also, symptoms may continue or get worse. Over time, the plantar fascia may be damaged. This can make it hard to walk or even stand without pain.   When to call your healthcare provider  Call your healthcare provider right away if you have any of these:    Fever of 100.4 F (38 C) or higher, or as directed by your provider    Chills    Symptoms that don t get better with treatment, or get worse    New symptoms, such as numbness, tingling, or weakness in the foot  [employer] last reviewed this educational content on 6/1/2019 2000-2021 [employer]. All rights reserved. This information is not intended as a substitute for professional medical care.  Always follow your healthcare professional's instructions.

## 2022-02-04 ENCOUNTER — OFFICE VISIT (OUTPATIENT)
Dept: PODIATRY | Facility: CLINIC | Age: 67
End: 2022-02-04
Attending: PHYSICIAN ASSISTANT
Payer: COMMERCIAL

## 2022-02-04 VITALS
BODY MASS INDEX: 29.4 KG/M2 | SYSTOLIC BLOOD PRESSURE: 148 MMHG | WEIGHT: 194 LBS | DIASTOLIC BLOOD PRESSURE: 79 MMHG | HEIGHT: 68 IN | HEART RATE: 65 BPM

## 2022-02-04 DIAGNOSIS — M72.2 PLANTAR FASCIITIS, LEFT: Primary | ICD-10-CM

## 2022-02-04 DIAGNOSIS — M72.2 PLANTAR FASCIITIS: ICD-10-CM

## 2022-02-04 PROCEDURE — 99203 OFFICE O/P NEW LOW 30 MIN: CPT | Performed by: PODIATRIST

## 2022-02-04 ASSESSMENT — MIFFLIN-ST. JEOR: SCORE: 1644.48

## 2022-02-04 ASSESSMENT — PAIN SCALES - GENERAL: PAINLEVEL: MODERATE PAIN (4)

## 2022-02-04 NOTE — PATIENT INSTRUCTIONS
Next Steps:      1. Support:  a. Wear supportive shoes, sandals, boots and/or inserts that have a rigid supportive sole.    i. This will offload the majority of tension forces that travel through your feet every step you take.    1. Skechers Max Cushioning Elite/Premier   2. Skechers Relax Fit D'Lux Walker  3. Reebok Walk Ultra 7 DMX MAX   4. Hoka Bondi walking shoes  5. Superfeet inserts ([email or web address])  b. It is important that you also wear supportive shoe wear in the house to continue providing support to your feet.    c. You may always use a cushioned liner for your shoes if that makes your feet feel better.  2. Stretching  a. Calf stretching is essential to offload the tension forces that travel through your feet every step you take  b. Preferred calf stretch is the Runner's Stretch  i. Place one foot behind the other foot, flat against the ground (it is important to keep the heel on the ground).  The back leg is the one that will be stretched.  1. Start with the knee straight and lean your hips into the wall, counter or whatever you are leaning into - count to ten.  2. Next, bend the knee.  You should feel the stretch lower in the calf muscle - count to ten.  c. Repeat this stretch once an hour to start off with.  When symptoms subside, I recommend performing the stretch 3 times daily to prevent any future problems.                3. Tissue Massage  a. It is important that you physically loosen the inflammation tissue to help your body heal the injured tissue.  b. I recommend soaking your foot in warm water to increase the microcirculation to the soft tissues.  You may add Epson salt to the water if you prefer.  c. You may apply an over-the-counter muscle rub, such as Voltaren gel, and deeply massage the injured tissue.  4. Reduce Inflammation  a. You can ice the injured tissue with an ice pack with a light cloth covering or soaking in ice water 20 minutes to reduce any acute inflammation, typically at the  end of the day.  b. If tolerated, you may take a Non-Steroidal Antiinflammatory medication (NSAID), such as Advil or Aleve, to help reduce the inflammation tissue.  This can help the overall healing of the injured tissue.  i. It is important to take food with any NSAID medication as the most common side effect is stomach irritation.  If you encounter any problems when taking NSAID, it is recommended that you stop taking the medication and notify your provider.    It is important to understand that most problems that develop in the foot and ankle are caused by excessive tension that cause microinjury to the soft tissues and inflammation in the foot and ankle.  By addressing the underlying causes with support and stretching as well as treating the current inflammatory conditions with tissue massage and anti-inflammatory treatments, most foot and ankle musculoskeletal conditions will resolve.  This may take time to heal.  However, if symptoms persist past 4 weeks you should return to the office for reevaluation to determine further treatment options.      SMOKING CESSATION  What's in cigarette smoke? - Cigarette smoke contains over 4,000 chemicals. Nicotine is one of the main ingredients which is an insecticide/herbicide. It is poisonous to our nervous system, increases blood clotting risk, and decreases the body's defenses to fight off infection. Another chemical is Carbon Monoxide is an asphyxiating gas that permanently binds to blood cells and blocks the transport of oxygen. This leads to tissue death and decreases your metabolism. Tar is a chemical that coats your lungs and trachea which impairs new oxygen coming in and carbon dioxide getting out of your body.   How does smoking impact surgery? - Smoking is particularly hazardous with regards to surgery. Surgery puts stress on the body and a smoker's body is already under strain from these chemicals. Putting the two together, especially for an elective surgery,  could be a recipe for disaster. Smoking before and after surgery increases your risk of heart problems, slow wound healing, delayed bone healing, blood clots, wound infection and anesthesia complications.   What are the benefits of quitting? - In 20 minutes your blood pressure will drop back down to normal. In 8 hours the carbon monoxide (a toxic gas) levels in your blood stream will drop by half, and oxygen levels will return to normal. In 48 hours your chance of having a heart attack will have decreased. All nicotine will have left your body. Your sense of taste and smell will return to a normal level. In 72 hours your bronchial tubes will relax, and your energy levels will increase. In 2 weeks your circulation will increase, and it will continue to improve for the next 10 weeks.    Recommendations for elective surgery - Ideally, patients should quit smoking 8 weeks before and at least 2 weeks after elective surgery in order to avoid complications. Simply cutting back on the amount of cigarettes smoked per day does not offer any benefit or decrease the risk of poor wound healing, heart problems, and infection. Smokers should also start taking Vitamin C and B for two weeks before surgery and two weeks after surgery.    Ways to Stop Smokin. Nicotine patches, lozenges, or gum  2. Support Groups  3. Medications (see below)    List of Medications:  1. Varenicline Tartrate (CHANTIX) (may be discontinued by FDA as of 2021)  2. Bupropion HCL (WELLBUTRIN, ZYBAN) - note: make sure Wellbutrin is for smoking cessation and not other issues   3. Nicotine Patch (NICODERM)   4. Nicotine Inhaler (NICOTROL)   5. Nicotine Gum Nicotine Polacrilex   6. Nicotine Lozenge: Nicotine Polacrilex (COMMIT)   * [facility] offers a smoking support group as well!  Please visit: [email or web address]  If you are interested in these, ask about getting a prescription or talk to your primary care doctor about what may be  the best way for you to quit.          none

## 2022-02-04 NOTE — LETTER
"    2/4/2022         RE: Quique Lyons  [address] [address] [id number]        Dear Colleague,    Thank you for referring your patient, Quique Lyons, to the [facility] [place]. Please see a copy of my visit note below.    PATIENT HISTORY:  Quique Lyons is a 66 year old male who presents to clinic in consultation at the request of  Steven Adam PA-C with a chief complaint of bilateral heel pain.  The patient is seen by themselves.  The patient relates the pain is primarily located around the bottom of the bilateral heels into the arch.  Reports insidious onset without acute precipitating event. that has been going on for 5 week(s).  The patient has previously tried stretching, and inserts with little relief.  Denies any prior history of similar issues..  The patient is currently employed as self-employeed.  Any previous notes and studies that pertain to the patient's condition were reviewed.    Pertinent medical, surgical and family history was reviewed in Epic chart and include atrial flutter, paroxysmal atrial fibrillation    Medications:   Current Outpatient Medications:      albuterol (PROAIR HFA/PROVENTIL HFA/VENTOLIN HFA) 108 (90 Base) MCG/ACT inhaler, Inhale 2 puffs into the lungs every 6 hours, Disp: 8 g, Rfl: 1     INCRUSE ELLIPTA 62.5 MCG/INH Inhaler, INHALE 1 PUFF INTO THE LUNGS DAILY, Disp: 1 each, Rfl: 1     losartan (COZAAR) 25 MG tablet, Take 1 tablet (25 mg) by mouth daily, Disp: 90 tablet, Rfl: 3     buPROPion (ZYBAN) 150 MG 12 hr tablet, Take 1 tablet (150 mg) by mouth 2 times daily (Patient not taking: Reported on 11/24/2021), Disp: 180 tablet, Rfl: 1     Allergies:  No Known Allergies    Vitals: BP (!) 148/79   Pulse 65   Ht 1.727 m (5' 8\")   Wt 88 kg (194 lb)   BMI 29.50 kg/m    BMI= Body mass index is 29.5 kg/m .    LOWER EXTREMITY PHYSICAL EXAM    Dermatologic: Skin is intact to left lower extremity without significant lesions, rash or " abrasion.        Vascular: DP & PT pulses are intact & regular on the left.   CFT and skin temperature is normal to the left lower extremity.     Neurologic: Lower extremity sensation is intact to light touch.  No evidence of weakness in the left lower extremity.        Musculoskeletal: Patient is ambulatory without assistive device or brace.  No gross ankle deformity noted.  No foot or ankle joint effusion is noted.  Noted pain on palpation on the plantar aspect of the left heel at the insertion point of plantar fascia.  No surrounding erythema or edema noted.  Noted tight gastroc complex on the left lower extremity.         ASSESSMENT / PLAN:     ICD-10-CM    1. Plantar fasciitis, left  M72.2 Orthotics and Prosthetics DME   2. Plantar fasciitis  M72.2 Orthopedic  Referral       I have explained to Quique about the conditions.  We discussed the underlying contributing factors to the condition as well as treatment options along with expected length of recovery.  At this time, the patient was educated on the importance of offloading supportive shoes and other devices.  I demonstrated to the patient calf stretches to perform every hour daily until symptoms resolve.  After symptoms resolve, the patient was advised to perform the stretches 3 times daily to prevent future recurrence.  The patient was instructed to perform warm soaks with Epson salt after which to also apply over-the-counter Voltaren gel to deeply massage the injured tissue.  The patient was instructed to do this on a daily basis until symptoms resolve.  The patient may also take over-the-counter NSAID medication, if tolerated, to help further reduce the inflammation tissue.   The patient was advised to take this type of medication with food to prevent stomach irritation and to stop taking the medication if stomach irritation occurs.  The patient was prescribed custom orthotics that will aid in offloading the tension forces to the soft tissues and  prevent further inflammation.   The patient will return in four weeks for reevaluation if the symptoms do not resolve.      Quique verbalized agreement with and understanding of the rational for the diagnosis and treatment plan.  All questions were answered to best of my ability and the patient's satisfaction. The patient was advised to contact the clinic with any questions that may arise after the clinic visit.      Disclaimer: This note consists of symbols derived from keyboarding, dictation and/or voice recognition software. As a result, there may be errors in the script that have gone undetected. Please consider this when interpreting information found in this chart.       JIMENA Abraham D.P.M., F.A.C.F.A.S.        Again, thank you for allowing me to participate in the care of your patient.        Sincerely,        Darrion Abraham DPM

## 2022-02-04 NOTE — PROGRESS NOTES
"PATIENT HISTORY:  Quique Lyons is a 66 year old male who presents to clinic in consultation at the request of  Stveen Adam PA-C with a chief complaint of bilateral heel pain.  The patient is seen by themselves.  The patient relates the pain is primarily located around the bottom of the bilateral heels into the arch.  Reports insidious onset without acute precipitating event. that has been going on for 5 week(s).  The patient has previously tried stretching, and inserts with little relief.  Denies any prior history of similar issues..  The patient is currently employed as self-employeed.  Any previous notes and studies that pertain to the patient's condition were reviewed.    Pertinent medical, surgical and family history was reviewed in Epic chart and include atrial flutter, paroxysmal atrial fibrillation    Medications:   Current Outpatient Medications:      albuterol (PROAIR HFA/PROVENTIL HFA/VENTOLIN HFA) 108 (90 Base) MCG/ACT inhaler, Inhale 2 puffs into the lungs every 6 hours, Disp: 8 g, Rfl: 1     INCRUSE ELLIPTA 62.5 MCG/INH Inhaler, INHALE 1 PUFF INTO THE LUNGS DAILY, Disp: 1 each, Rfl: 1     losartan (COZAAR) 25 MG tablet, Take 1 tablet (25 mg) by mouth daily, Disp: 90 tablet, Rfl: 3     buPROPion (ZYBAN) 150 MG 12 hr tablet, Take 1 tablet (150 mg) by mouth 2 times daily (Patient not taking: Reported on 11/24/2021), Disp: 180 tablet, Rfl: 1     Allergies:  No Known Allergies    Vitals: BP (!) 148/79   Pulse 65   Ht 1.727 m (5' 8\")   Wt 88 kg (194 lb)   BMI 29.50 kg/m    BMI= Body mass index is 29.5 kg/m .    LOWER EXTREMITY PHYSICAL EXAM    Dermatologic: Skin is intact to left lower extremity without significant lesions, rash or abrasion.        Vascular: DP & PT pulses are intact & regular on the left.   CFT and skin temperature is normal to the left lower extremity.     Neurologic: Lower extremity sensation is intact to light touch.  No evidence of weakness in the left lower extremity.   "      Musculoskeletal: Patient is ambulatory without assistive device or brace.  No gross ankle deformity noted.  No foot or ankle joint effusion is noted.  Noted pain on palpation on the plantar aspect of the left heel at the insertion point of plantar fascia.  No surrounding erythema or edema noted.  Noted tight gastroc complex on the left lower extremity.         ASSESSMENT / PLAN:     ICD-10-CM    1. Plantar fasciitis, left  M72.2 Orthotics and Prosthetics DME   2. Plantar fasciitis  M72.2 Orthopedic  Referral       I have explained to Quique about the conditions.  We discussed the underlying contributing factors to the condition as well as treatment options along with expected length of recovery.  At this time, the patient was educated on the importance of offloading supportive shoes and other devices.  I demonstrated to the patient calf stretches to perform every hour daily until symptoms resolve.  After symptoms resolve, the patient was advised to perform the stretches 3 times daily to prevent future recurrence.  The patient was instructed to perform warm soaks with Epson salt after which to also apply over-the-counter Voltaren gel to deeply massage the injured tissue.  The patient was instructed to do this on a daily basis until symptoms resolve.  The patient may also take over-the-counter NSAID medication, if tolerated, to help further reduce the inflammation tissue.   The patient was advised to take this type of medication with food to prevent stomach irritation and to stop taking the medication if stomach irritation occurs.  The patient was prescribed custom orthotics that will aid in offloading the tension forces to the soft tissues and prevent further inflammation.   The patient will return in four weeks for reevaluation if the symptoms do not resolve.      Quique verbalized agreement with and understanding of the rational for the diagnosis and treatment plan.  All questions were answered to best  of my ability and the patient's satisfaction. The patient was advised to contact the clinic with any questions that may arise after the clinic visit.      Disclaimer: This note consists of symbols derived from keyboarding, dictation and/or voice recognition software. As a result, there may be errors in the script that have gone undetected. Please consider this when interpreting information found in this chart.       JIMENA Abraham D.P.M., [email or web address].

## 2022-02-04 NOTE — NURSING NOTE
"Chief Complaint   Patient presents with     Consult     plantarfasciitis, BL feet \"left foot worse\"       Initial BP (!) 156/72   Pulse 65   Ht 1.727 m (5' 8\")   Wt 88 kg (194 lb)   BMI 29.50 kg/m   Estimated body mass index is 29.5 kg/m  as calculated from the following:    Height as of this encounter: 1.727 m (5' 8\").    Weight as of this encounter: 88 kg (194 lb).  Medications and allergies reviewed.      Pili MARCIAL MA    "

## 2022-02-15 ENCOUNTER — OFFICE VISIT (OUTPATIENT)
Dept: DERMATOLOGY | Facility: CLINIC | Age: 67
End: 2022-02-15
Payer: COMMERCIAL

## 2022-02-15 DIAGNOSIS — L57.8 SUN-DAMAGED SKIN: ICD-10-CM

## 2022-02-15 DIAGNOSIS — Z85.828 HISTORY OF NONMELANOMA SKIN CANCER: Primary | ICD-10-CM

## 2022-02-15 DIAGNOSIS — L82.1 SEBORRHEIC KERATOSIS: ICD-10-CM

## 2022-02-15 DIAGNOSIS — D22.9 MULTIPLE BENIGN NEVI: ICD-10-CM

## 2022-02-15 DIAGNOSIS — L81.4 SOLAR LENTIGO: ICD-10-CM

## 2022-02-15 DIAGNOSIS — D18.01 CHERRY ANGIOMA: ICD-10-CM

## 2022-02-15 DIAGNOSIS — L40.0 PLAQUE PSORIASIS: ICD-10-CM

## 2022-02-15 PROCEDURE — 99213 OFFICE O/P EST LOW 20 MIN: CPT | Performed by: DERMATOLOGY

## 2022-02-15 NOTE — LETTER
2/15/2022         RE: Quique Lyons  [address] [address] [facility] [address]        Dear Colleague,    Thank you for referring your patient, Quique Lyons, to the [facility]. Please see a copy of my visit note below.    [facility] Dermatology Note  Encounter Date: Feb 15, 2022  Office Visit     Dermatology Problem List:  Last skin check 2/15/22, recommended yearly  1. 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 Works outside a lot.  ____________________________________________    Assessment & Plan:    # History of NMSC. No evidence of recurrence.   - Recommend sunscreens SPF #30 or greater, protective clothing and avoidance of tanning beds.   - Recommended yearly skin exams.    # Sun damaged skin with solar lentigines: Chronic, stable.  - Recommend sunscreens SPF #30 or greater, protective clothing and avoidance of tanning beds.    # Benign skin findings including: seborrheic keratoses, cherry angioma - minor, self limited problem  - No further intervention required. Patient to report changes.   - Patient reassured of the benign nature of these lesions.    # Multiple clinically benign nevi: Chronic, stable  - No further intervention required. Patient to report changes.   - Patient reassured of the benign nature of these lesions.    # Skin colored papules of the left lower mucosal lip and right buccal mucosa.  - Most consistent with scar tissue/bite neuromas.  - Patient is scheduled with oral surgeon today. Recommended discussing this at that appointment.    # Plaque psoriasis. Chronic, flared on the bilateral palmar hands.  - Offered topicals. Patient is not bothered by this at this time.    Procedures Performed:   None.    Follow-up: 1 year in-person for skin check, or earlier for new or changing lesions.    Staff and Scribe:      Scribe Disclosure:   I, Helen Salinasin, am serving as a scribe to document services personally performed by this physician, Dr. Edilia Patel, based on data collection and the provider's statements to me.     Provider Disclosure:   The documentation recorded by the scribe accurately reflects the services I personally performed and the decisions made by me.    Edilia Patel MD    Department of Dermatology  [facility]: Phone: [phone], Fax:[phone]  [facility]: Phone: [phone], Fax: [phone]    ____________________________________________    CC: Skin Check (FBSE. Area of concern-had 3 deer tick bites 3 months ago-2 on neck and thigh, still itchy, 2 spots inside mouth. Hx of NMSC)    HPI:  Mr. Quique Lyons is a(n) 66 year old male who presents today as a return patient for skin check.    Last seen 6/15/21 for skin check. No concerning lesions were noted.    Today, Quique notes having been bit by ticks on the neck and thigh. This happened during deer hunting season in November. The areas are still intermittently itchy. There are note having a root canal in January. There was an infection from the root canal. There have been a couple lesions that appeared after this. He has seen a dentist for this. Quique is also scheduled to see an oral surgeon later today.    Patient is otherwise feeling well, without additional skin concerns.    Labs Reviewed:  N/A    Physical Exam:  Vitals: There were no vitals taken for this visit.  SKIN: Total skin excluding the undergarment areas was performed. The exam included the head/face, neck, both arms, chest, back, abdomen, buttocks, both legs, digits and/or nails.  - Lambert Type III  - Scattered brown macules on sun exposed areas.  - There are dome shaped bright red papules on the trunk.   - Multiple regular brown pigmented macules  and papules are identified on the trunk and extremities. About 40 nevi in all. None are atypical.   - There are waxy stuck on tan to brown papules on the trunk.  - Well healed scar on the left nasolabial fold. No pigment recurrence, no nodularity.  - There is a skin colored papule on the oral mucosa of the left lower inner lip.  - There is a skin colored papule on the right lateral buccal mucosa.  - Psoriasiform skin colored plaques on the bilateral palmar hands  - No other lesions of concern on areas examined.     Medications:  Current Outpatient Medications   Medication     albuterol (PROAIR HFA/PROVENTIL HFA/VENTOLIN HFA) 108 (90 Base) MCG/ACT inhaler     losartan (COZAAR) 25 MG tablet     buPROPion (ZYBAN) 150 MG 12 hr tablet     INCRUSE ELLIPTA 62.5 MCG/INH Inhaler     No current facility-administered medications for this visit.      Past Medical History:   Patient Active Problem List   Diagnosis     Advanced directives, counseling/discussion     Tobacco abuse     Atrial flutter (H)     CRICKET (obstructive sleep apnea)- mild (AHI 8)     Hypertension goal BP (blood pressure) < 140/90     PAF (paroxysmal atrial fibrillation) (H)     Past Medical History:   Diagnosis Date     Atrial fibrillation with RVR (H) 9/11/2020     Chronic obstructive pulmonary disease with acute exacerbation (HCC) 12/1/2015        CC Referred Self, MD  No address on file on close of this encounter.       Again, thank you for allowing me to participate in the care of your patient.        Sincerely,        Edilia Patel MD

## 2022-02-15 NOTE — NURSING NOTE
Quique Lyons's goals for this visit include:   Chief Complaint   Patient presents with     Skin Check     FBSE. Area of concern-had 3 deer tick bites 3 months ago-2 on neck and thigh, still itchy, 2 spots inside mouth. Hx of NMSC       He requests these members of his care team be copied on today's visit information:     PCP: Rosemarie Payne    Referring Provider:  Referred Self, MD  No address on file    There were no vitals taken for this visit.    Do you need any medication refills at today's visit? Flor Rockwell on 2/15/2022 at 9:26 AM

## 2022-02-18 ENCOUNTER — OFFICE VISIT (OUTPATIENT)
Dept: FAMILY MEDICINE | Facility: CLINIC | Age: 67
End: 2022-02-18
Payer: COMMERCIAL

## 2022-02-18 VITALS
WEIGHT: 201 LBS | BODY MASS INDEX: 30.56 KG/M2 | HEART RATE: 75 BPM | OXYGEN SATURATION: 97 % | DIASTOLIC BLOOD PRESSURE: 72 MMHG | SYSTOLIC BLOOD PRESSURE: 132 MMHG | TEMPERATURE: 96.7 F

## 2022-02-18 DIAGNOSIS — I10 HYPERTENSION GOAL BP (BLOOD PRESSURE) < 140/90: ICD-10-CM

## 2022-02-18 DIAGNOSIS — G47.33 OSA (OBSTRUCTIVE SLEEP APNEA): Chronic | ICD-10-CM

## 2022-02-18 DIAGNOSIS — Z01.818 PREOP GENERAL PHYSICAL EXAM: Primary | ICD-10-CM

## 2022-02-18 DIAGNOSIS — Z72.0 TOBACCO ABUSE: ICD-10-CM

## 2022-02-18 DIAGNOSIS — H26.9 CATARACT OF BOTH EYES, UNSPECIFIED CATARACT TYPE: ICD-10-CM

## 2022-02-18 DIAGNOSIS — I48.0 PAF (PAROXYSMAL ATRIAL FIBRILLATION) (H): ICD-10-CM

## 2022-02-18 LAB
ANION GAP SERPL CALCULATED.3IONS-SCNC: 2 MMOL/L (ref 3–14)
BUN SERPL-MCNC: 15 MG/DL (ref 7–30)
CALCIUM SERPL-MCNC: 9 MG/DL (ref 8.5–10.1)
CHLORIDE BLD-SCNC: 107 MMOL/L (ref 94–109)
CO2 SERPL-SCNC: 30 MMOL/L (ref 20–32)
CREAT SERPL-MCNC: 0.88 MG/DL (ref 0.66–1.25)
GFR SERPL CREATININE-BSD FRML MDRD: >90 ML/MIN/1.73M2
GLUCOSE BLD-MCNC: 109 MG/DL (ref 70–99)
POTASSIUM BLD-SCNC: 4.4 MMOL/L (ref 3.4–5.3)
SODIUM SERPL-SCNC: 139 MMOL/L (ref 133–144)

## 2022-02-18 PROCEDURE — 99214 OFFICE O/P EST MOD 30 MIN: CPT | Performed by: PHYSICIAN ASSISTANT

## 2022-02-18 PROCEDURE — 36415 COLL VENOUS BLD VENIPUNCTURE: CPT | Performed by: PHYSICIAN ASSISTANT

## 2022-02-18 PROCEDURE — 80048 BASIC METABOLIC PNL TOTAL CA: CPT | Performed by: PHYSICIAN ASSISTANT

## 2022-02-18 ASSESSMENT — PAIN SCALES - GENERAL: PAINLEVEL: NO PAIN (0)

## 2022-02-18 NOTE — PATIENT INSTRUCTIONS
Preparing for Your Surgery  Getting started  A nurse will call you to review your health history and instructions. They will give you an arrival time based on your scheduled surgery time. Please be ready to share:    Your doctor's clinic name and phone number    Your medical, surgical and anesthesia history    A list of allergies and sensitivities    A list of medicines, including herbal treatments and over-the-counter drugs    Whether the patient has a legal guardian (ask how to send us the papers in advance)  Please tell us if you're pregnant--or if there's any chance you might be pregnant. Some surgeries may injure a fetus (unborn baby), so they require a pregnancy test. Surgeries that are safe for a fetus don't always need a test, and you can choose whether to have one.   If you have a child who's having surgery, please ask for a copy of Preparing for Your Child's Surgery.    Preparing for surgery    Within 30 days of surgery: Have a pre-op exam (sometimes called an H&P, or History and Physical). This can be done at a clinic or pre-operative center.  ? If you're having a , you may not need this exam. Talk to your care team.    At your pre-op exam, talk to your care team about all medicines you take. If you need to stop any medicines before surgery, ask when to start taking them again.  ? We do this for your safety. Many medicines can make you bleed too much during surgery. Some change how well surgery (anesthesia) drugs work.    Call your insurance company to let them know you're having surgery. (If you don't have insurance, call [phone].)    Call your clinic if there's any change in your health. This includes signs of a cold or flu (sore throat, runny nose, cough, rash, fever). It also includes a scrape or scratch near the surgery site.    If you have questions on the day of surgery, call your hospital or surgery center.  COVID testing  You may need to be tested for COVID-19 before having  surgery. If so, your surgical team will give you instructions for scheduling this test, separate from your preoperative history and physical.  Eating and drinking guidelines  For your safety: Unless your surgeon tells you otherwise, follow the guidelines below.    Eat and drink as usual until 8 hours before surgery. After that, no food or milk.    Drink clear liquids until 2 hours before surgery. These are liquids you can see through, like water, Gatorade and Propel Water. You may also have black coffee and tea (no cream or milk).    Nothing by mouth within 2 hours of surgery. This includes gum, candy and breath mints.    If you drink alcohol: Stop drinking it the night before surgery.    If your care team tells you to take medicine on the morning of surgery, it's okay to take it with a sip of water.  Preventing infection    Shower or bathe the night before and morning of your surgery. Follow the instructions your clinic gave you. (If no instructions, use regular soap.)    Don't shave or clip hair near your surgery site. We'll remove the hair if needed.    Don't smoke or vape the morning of surgery. You may chew nicotine gum up to 2 hours before surgery. A nicotine patch is okay.  ? Note: Some surgeries require you to completely quit smoking and nicotine. Check with your surgeon.    Your care team will make every effort to keep you safe from infection. We will:  ? Clean our hands often with soap and water (or an alcohol-based hand rub).  ? Clean the skin at your surgery site with a special soap that kills germs.  ? Give you a special gown to keep you warm. (Cold raises the risk of infection.)  ? Wear special hair covers, masks, gowns and gloves during surgery.  ? Give antibiotic medicine, if prescribed. Not all surgeries need antibiotics.  What to bring on the day of surgery    Photo ID and insurance card    Copy of your health care directive, if you have one    Glasses and hearing aides (bring cases)  ? You can't  wear contacts during surgery    Inhaler and eye drops, if you use them (tell us about these when you arrive)    CPAP machine or breathing device, if you use them    A few personal items, if spending the night    If you have . . .  ? A pacemaker, ICD (cardiac defibrillator) or other implant: Bring the ID card.  ? An implanted stimulator: Bring the remote control.  ? A legal guardian: Bring a copy of the certified (court-stamped) guardianship papers.  Please remove any jewelry, including body piercings. Leave jewelry and other valuables at home.  If you're going home the day of surgery    You must have a responsible adult drive you home. They should stay with you overnight as well.    If you don't have someone to stay with you, and you aren't safe to go home alone, we may keep you overnight. Insurance often won't pay for this.  After surgery  If it's hard to control your pain or you need more pain medicine, please call your surgeon's office.  Questions?   If you have any questions for your care team, list them here: _________________________________________________________________________________________________________________________________________________________________________ ____________________________________ ____________________________________ ____________________________________  For informational purposes only. Not to replace the advice of your health care provider. Copyright   2003, 2019 [facility], [place]. All rights reserved. Clinically reviewed by Gisella Graham MD. [employer] [id number] - REV 07/21.

## 2022-02-18 NOTE — NURSING NOTE
"Chief Complaint   Patient presents with     Pre-Op Exam       Initial BP (!) 142/75   Pulse 75   Temp (!) 96.7  F (35.9  C) (Tympanic)   Wt 91.2 kg (201 lb)   SpO2 97%   BMI 30.56 kg/m   Estimated body mass index is 30.56 kg/m  as calculated from the following:    Height as of 2/4/22: 1.727 m (5' 8\").    Weight as of this encounter: 91.2 kg (201 lb).  Medication Reconciliation: complete    MONI English MA    "

## 2022-02-18 NOTE — RESULT ENCOUNTER NOTE
Mr. Lyons,    All of your labs were normal/near normal for you.    Please contact the clinic if you have additional questions.  Thank you.    Sincerely,    Steven Adam PA-C

## 2022-02-18 NOTE — PROGRESS NOTES
" Intensive Care Unit   Progress Note      Today's Date: 2022   Patient Name: Fatoumata Jules, "Stephania"  MRN: [id number]  YOB: 2020  Room/Bed: 0008/0008-A  GA at Birth: 30 4/7     DOL: 12 days  CGA: 32w 2d  Current Weight: 1355 g (2 lb 15.8 oz) Current Head Circumference: 26.5 cm    Weight change: 10 g (0.4 oz)  Current Height: 41.5 cm (16.34")      Interval History      No acute issues overnight    Vital Signs:   Last Recorded Range during the last 24 hours    Temp:99.2 °F (37.3 °C)  HR: (!) 186  RR: 55  BP: (!) 60/31  MAP: 43  SpO2: (!) 99 % Temp  Min: 98.2 °F (36.8 °C)  Max: 99.2 °F (37.3 °C)  Pulse  Min: 149  Max: 186  Resp  Min: 36  Max: 65  BP  Min: 60/31  Max: 61/42  MAP (mmHg)  Min: 43  Max: 47  SpO2  Min: 95 %  Max: 100 %      Physical Exam:      GENERAL: Alert, in [place], no acute distress.  HEENT: Soft and flat fontanelle, intact palate, patent sutures and pink MMM. OGT secure.  RESPIRATORY: Clear breath sounds bilaterally, good air exchange and comfortable work of breathing.  CARDIAC: Normal sinus rhythm, normal perfusion, normal pulses and no murmur.  ABDOMEN: Soft and flat abdomen, active bowel sounds and no organomegaly.  : Normal  genitalia and patent anus.  NEUROLOGIC: Grossly intact for gestational age.  NECK AND SPINE: Intact.  EXTREMITIES: Moves all extremities.   SKIN: Intact.     Apneas/Bradycardia/Desaturations:  Last Recorded Last 24 hours    Date: 3/23/22  Apnea (secs): 20 secs  Bradycardia Rate: 70  Event SpO2: 87  Intervention: Tactile stimulation Apnea x 6  Luis Daniel x 6 HR Range: 52-75  Desat x 6  Stim required x3      Respiratory Support: Room air    Medications:  Scheduled:   caffeine citrate  8 mg/kg/day (Order-Specific) Per OG tube Daily        Intake and Output      INTAKE:  TPN/IVFs ENTERAL      EBM with HMF 24 hal/ounce 27 mL [id number]  Nipple attempts: none     Total Volume Total Calories    159.4 mL/kg/day 128 kcal/kg/day      OUTPUT:  Urine " [employer]  [address] [facility] [address]  Phone: [phone]  Primary Provider: [facility]  Pre-op Performing Provider: JOSSY VASQUEZ    PREOPERATIVE EVALUATION:  Today's date: 2/18/2022    Quique Lyons is a 66 year old male who presents for a preoperative evaluation.    Surgical Information:  Surgery/Procedure: cataracts   Surgery Location: [facility]  Surgeon: Vladimir Donovan  Surgery Date: 02/24/22- right eye and 03/03/22 left eye  Time of Surgery: 02/24/22 12:30pm  Where patient plans to recover: At home with family  Fax number for surgical facility: [phone]    Type of Anesthesia Anticipated: to be determined    Assessment & Plan     The proposed surgical procedure is considered LOW risk.      ICD-10-CM    1. Preop general physical exam  Z01.818 Basic metabolic panel  (Ca, Cl, CO2, Creat, Gluc, K, Na, BUN)   2. Cataract of both eyes, unspecified cataract type  H26.9    3. CRICKET (obstructive sleep apnea)- mild (AHI 8)  G47.33    4. Hypertension goal BP (blood pressure) < 140/90  I10    5. PAF (paroxysmal atrial fibrillation) (H)  I48.0    6. Tobacco abuse  Z72.0          Risks and Recommendations:  The patient has the following additional risks and recommendations for perioperative complications:   - No identified additional risk factors other than previously addressed    Medication Instructions:  Patient is to take all scheduled medications on the day of surgery    RECOMMENDATION:  APPROVAL GIVEN to proceed with proposed procedure, without further diagnostic evaluation.      Subjective     HPI related to upcoming procedure: years of visual difficulties and found to have bilateral cataract.     Preop Questions 2/13/2022   1. Have you ever had a heart attack or stroke? No   2. Have you ever had surgery on your heart or blood vessels, such as a stent placement, a coronary artery bypass, or surgery on an artery in your head,  Stool Other    8  5          Assessment and Plan      Patient Active Problem List    Diagnosis Date Noted    Concern about growth 2022     Receiving EBM24 with HMF.    3/21 GV 20gm/kg/day    Plan:  Follow weekly GV.      Apnea of prematurity 2022     Infant  at 30 4/7 weeks.   Caffeine 3/12-current.     6 episodes in past 24 hours; 3 self recovered and 3 required stimulation. Staff reports infant appears to having reflux    Plan:   Continue caffeine.   Follow clinically.         Prematurity, 1,250-1,499 grams, 29-30 completed weeks 2022     Patient is a 30 4/7 wga female infant born on 2020 at 10:40 AM to a 28yo   Mother  via , [place] [employer]. [facility]renatal care with MD at [facility]. Prenatal History concerning for complete placenta previa and GDM treated with metformin. [place] was [place] on 3/5/22 and discharged on 3/10/22. During this admission she received BMZ x2 and neuroprotection IV magnesium sulfate.  Maternal medications prior to delivery include Metformin, PNV . Length of ROM: at delivery and clear. At delivery, infant resuscitation included BM CPAP at +5 cm PEEP and continuous CPAP at +5cm PEEP enroute to NICU. APGAR score 8  at 1 minute, 9  at 5 minutes. Admitted to NICU for Prematurity, RDS     Maternal Labs:   Blood Type:O+  Hep B:negative  RPR: NR 21; 3/11/22 NR   HIV:negative  Rubella: immune  GBS: unknown  GC/Chlamydia: negative  COVID: negative 3/11/22        TRACKING:   Tox screens: 3/11 maternal UDS negative   NBS: 3/12 unsatisfactory; repeated 3/16; results pending.  Will need repeat at 28 days   CCHD: Prior to discharge    Hearing screen: Prior to discharge    Immunizations:    Hep B: Prior to discharge     Synagis candidate:    Car seat challenge:Prior to discharge    CPR training: Parents to view video prior to discharge    Early Steps referral if indicated   Room in: Prior to discharge    Outpatient appointments: To be made prior  neck, heart, or legs? YES - heart ablation    3. Do you have chest pain with activity? No   4. Do you have a history of  heart failure? No   5. Do you currently have a cold, bronchitis or symptoms of other infection? No   6. Do you have a cough, shortness of breath, or wheezing? No   7. Do you or anyone in your family have previous history of blood clots? No   8. Do you or does anyone in your family have a serious bleeding problem such as prolonged bleeding following surgeries or cuts? No   9. Have you ever had problems with anemia or been told to take iron pills? No   10. Have you had any abnormal blood loss such as black, tarry or bloody stools? No   11. Have you ever had a blood transfusion? No   12. Are you willing to have a blood transfusion if it is medically needed before, during, or after your surgery? Yes   13. Have you or any of your relatives ever had problems with anesthesia? No   14. Do you have sleep apnea, excessive snoring or daytime drowsiness? YES - sleep apnea   14a. Do you have a CPAP machine? Yes   15. Do you have any artifical heart valves or other implanted medical devices like a pacemaker, defibrillator, or continuous glucose monitor? No   16. Do you have artificial joints? No   17. Are you allergic to latex? No       Health Care Directive:  Patient does not have a Health Care Directive or Living Will: Discussed advance care planning with patient; however, patient declined at this time.    Status of Chronic Conditions:  See problem list for active medical problems.  Problems all longstanding and stable, except as noted/documented.  See ROS for pertinent symptoms related to these conditions.      Review of Systems  CONSTITUTIONAL: NEGATIVE for fever, chills, change in weight  INTEGUMENTARY/SKIN: NEGATIVE for worrisome rashes, moles or lesions  EYES: NEGATIVE for vision changes or irritation  ENT/MOUTH: NEGATIVE for ear, mouth and throat problems  RESP: NEGATIVE for significant cough or  SOB  CV: NEGATIVE for chest pain, palpitations or peripheral edema  GI: NEGATIVE for nausea, abdominal pain, heartburn, or change in bowel habits  : NEGATIVE for frequency, dysuria, or hematuria  MUSCULOSKELETAL: NEGATIVE for significant arthralgias or myalgia  NEURO: NEGATIVE for weakness, dizziness or paresthesias  ENDOCRINE: NEGATIVE for temperature intolerance, skin/hair changes  HEME: NEGATIVE for bleeding problems  PSYCHIATRIC: NEGATIVE for changes in mood or affect    Patient Active Problem List    Diagnosis Date Noted     Hypertension goal BP (blood pressure) < 140/90 10/21/2021     Priority: Medium     PAF (paroxysmal atrial fibrillation) (H) 10/21/2021     Priority: Medium     CRICKET (obstructive sleep apnea)- mild (AHI 8) 11/16/2020     Priority: Medium     11/15/2020 [facility] Sleep Study (195.0 lbs) - AHI 8.8, RDI 13.9, Supine AHI 11.0, REM AHI 22.9, Low O2 83.1%, Time Spent ?88% 1.2 minutes / Time Spent ?89% 4.0 minutes. PLM index was 74.2 movements per hour. The PLM Arousal Index was 17.1 per hour.       Atrial flutter (H) 10/06/2020     Priority: Medium     Admitted 9/11/20 with atrial flutter with rapid ventricular response. Converted with diltiazam       Tobacco abuse 04/06/2015     Priority: Medium     Advanced directives, counseling/discussion 01/13/2014     Priority: Medium     Discussed Advance Directive planning with patient; however, patient declined at this time.        Past Medical History:   Diagnosis Date     Atrial fibrillation with RVR (H) 9/11/2020     Cancer (H) 9/2020    skin cancer     Chronic obstructive pulmonary disease with acute exacerbation (HCC) 12/1/2015     Hypertension 9/2020     Past Surgical History:   Procedure Laterality Date     CARDIAC SURGERY       EP ABLATION ATRIAL FLUTTER N/A 10/16/2020    Procedure: EP Ablation Atrial Flutter;  Surgeon: Angeli Seth MD;  Location:  HE[facility] CARDIAC CATH LAB     Current Outpatient Medications   Medication  to discharge     Peds:     Social: 3/23 Message left       RDS (respiratory distress syndrome in the ) 2022     Mother was [facility] 3[facility]5-3/10 [facility] and received BMZ x 2 doses and IV magnesium for neuroprotection. Infant with fair respiratory effort in OR; BM CPAP given with improvement. SACHA cannula placed and attached to bag at PEEP+5 and transported to NICU;  no supplemental O2 required. On arrival to NICU infant placed on VT 5 lpm and FiO2 % 21%.  CXR with good expansion and mild perihilar streaking.     Vapotherm 3/11-3/21.       Plan:  Support and/or wean as needed  Follow CBGs prn.        At risk for anemia 2022     Admit H/H 17.7/51.6    Plan:   Follow serial H/H.   Start Fe at 2 weeks of life if on full feeds.         At risk for alteration in nutrition 2022     Mother desires to breast feed and will pump to provide milk and she has also consented to use of donor EBM as needed. NPO on admission with D10 Starter TPN at 80 ml/kg/d.   Feeds started per protocol 3/12.      Currently tolerating EBM or Donor EBM 24cal/oz, 27 mls every 3 hours, gavage (~160 ml/kg/day).     Plan:  Continue  feeds of EBM or DBM 24 hal, 27 mls every 3 hours, gavage (~160 ml/kg/day).         At risk for developmental delay 2022     Infant 30 4/7 weeks gestation and 1310 gm  3/19 HUS normal    Plan:  Will need eye exam at 4 weeks of age.  Cranial ultrasound at term or prior to discharge.  Early steps referral at discharge.           jaundice associated with  delivery 2022     Maternal Blood type O+/ Infant blood type O+/caro negative.  Phototherapy 3/13-current    3/12 T bili 5.4 (below light level).   3/13 T bili 8.4 phototherapy initiated.  3/14 T bili 7.4   3/15 T bili 6.3 - discontinue phototherapy  3/16 Tbili 6.7  3/18 Bili 8.5/0.3, below light level per [facility] ([place]).  3/19 Bili 8.4/0.4.    Plan:   Follow level prn      Infant of diabetic  Sig Dispense Refill     albuterol (PROAIR HFA/PROVENTIL HFA/VENTOLIN HFA) 108 (90 Base) MCG/ACT inhaler Inhale 2 puffs into the lungs every 6 hours 8 g 1     losartan (COZAAR) 25 MG tablet Take 1 tablet (25 mg) by mouth daily 90 tablet 3       No Known Allergies     Social History     Tobacco Use     Smoking status: Current Every Day Smoker     Packs/day: 1.00     Years: 40.00     Pack years: 40.00     Types: Cigarettes     Start date: 1973     Smokeless tobacco: Never Used     Tobacco comment: off and on quit for nine years and started again   Substance Use Topics     Alcohol use: Yes     Comment: socially     Family History   Problem Relation Age of Onset     Dementia Father      Diabetes No family hx of      Coronary Artery Disease No family hx of      Colon Cancer No family hx of      History   Drug Use Unknown         Objective     /72   Pulse 75   Temp (!) 96.7  F (35.9  C) (Tympanic)   Wt 91.2 kg (201 lb)   SpO2 97%   BMI 30.56 kg/m      Physical Exam    GENERAL APPEARANCE: healthy, alert and no distress     EYES: EOMI,  PERRL     HENT: ear canals and TM's normal and nose and mouth without ulcers or lesions     NECK: no adenopathy, no asymmetry, masses, or scars and thyroid normal to palpation     RESP: lungs clear to auscultation - no rales, rhonchi or wheezes     CV: regular rates and rhythm, normal S1 S2, no S3 or S4 and no murmur, click or rub     ABDOMEN:  soft, nontender, no HSM or masses and bowel sounds normal     MS: extremities normal- no gross deformities noted, no evidence of inflammation in joints, FROM in all extremities.     SKIN: no suspicious lesions or rashes     NEURO: Normal strength and tone, sensory exam grossly normal, mentation intact and speech normal     PSYCH: mentation appears normal. and affect normal/bright     LYMPHATICS: No cervical adenopathy    Recent Labs   Lab Test 09/08/21  0840 10/16/20  1250 09/12/20  0603 09/11/20  1155   HGB  --  16.7 15.1 17.3   PLT  --   mother 2022     Maternal GDM treated with Metformin.   Accu cheks stable on maintenance IV fluids.   3/15 Glucose on CMP 77  3/16 CMP glucose 75; accucheck 91. Discontinued TPN.     Plan:  Resolve       Nelly Crespo, ALEXAP-BC      Sade Padilla MD       [phone]   INR  --   --   --  1.09    138 142 139   POTASSIUM 4.3 4.3 4.4 4.1   CR 0.93 0.96 0.92 0.89        Diagnostics:  Results for orders placed or performed in visit on 02/18/22   Basic metabolic panel  (Ca, Cl, CO2, Creat, Gluc, K, Na, BUN)     Status: Abnormal   Result Value Ref Range    Sodium 139 133 - 144 mmol/L    Potassium 4.4 3.4 - 5.3 mmol/L    Chloride 107 94 - 109 mmol/L    Carbon Dioxide (CO2) 30 20 - 32 mmol/L    Anion Gap 2 (L) 3 - 14 mmol/L    Urea Nitrogen 15 7 - 30 mg/dL    Creatinine 0.88 0.66 - 1.25 mg/dL    Calcium 9.0 8.5 - 10.1 mg/dL    Glucose 109 (H) 70 - 99 mg/dL    GFR Estimate >90 >60 mL/min/1.73m2         No EKG required for low risk surgery (cataract, skin procedure, breast biopsy, etc).    Revised Cardiac Risk Index (RCRI):  The patient has the following serious cardiovascular risks for perioperative complications:   - No serious cardiac risks = 0 points     RCRI Interpretation: 0 points: Class I (very low risk - 0.4% complication rate)           Signed Electronically by: Steven Adam PA-C  Copy of this evaluation report is provided to requesting physician.  Discussed this patient with Steven Adam and agree with above assessment and plan. The patient is an acceptable candidate for the proposed anasthesia.  Guillaume Hensley MD

## 2022-02-18 NOTE — PROGRESS NOTES
I faxed the Pre Op and lab results to [facility] in [place] at fax #[phone].  Irina Jensen,  [facility]

## 2022-03-07 ENCOUNTER — MYC MEDICAL ADVICE (OUTPATIENT)
Dept: FAMILY MEDICINE | Facility: CLINIC | Age: 67
End: 2022-03-07
Payer: COMMERCIAL

## 2022-03-08 ENCOUNTER — OFFICE VISIT (OUTPATIENT)
Dept: URGENT CARE | Facility: URGENT CARE | Age: 67
End: 2022-03-08
Payer: COMMERCIAL

## 2022-03-08 VITALS
DIASTOLIC BLOOD PRESSURE: 81 MMHG | SYSTOLIC BLOOD PRESSURE: 156 MMHG | BODY MASS INDEX: 31.35 KG/M2 | TEMPERATURE: 97.7 F | OXYGEN SATURATION: 96 % | WEIGHT: 206.2 LBS | HEART RATE: 71 BPM

## 2022-03-08 DIAGNOSIS — R05.9 COUGH: Primary | ICD-10-CM

## 2022-03-08 DIAGNOSIS — I10 HYPERTENSION GOAL BP (BLOOD PRESSURE) < 140/90: ICD-10-CM

## 2022-03-08 DIAGNOSIS — J44.9 CHRONIC OBSTRUCTIVE PULMONARY DISEASE, UNSPECIFIED COPD TYPE (H): ICD-10-CM

## 2022-03-08 PROCEDURE — 99214 OFFICE O/P EST MOD 30 MIN: CPT | Performed by: INTERNAL MEDICINE

## 2022-03-08 RX ORDER — PREDNISONE 20 MG/1
60 TABLET ORAL DAILY
Qty: 15 TABLET | Refills: 0 | Status: SHIPPED | OUTPATIENT
Start: 2022-03-08 | End: 2022-03-13

## 2022-03-08 RX ORDER — AZITHROMYCIN 250 MG/1
TABLET, FILM COATED ORAL
Qty: 6 TABLET | Refills: 0 | Status: SHIPPED | OUTPATIENT
Start: 2022-03-08 | End: 2022-03-13

## 2022-03-08 NOTE — PATIENT INSTRUCTIONS
Quique to follow up with Primary Care provider regarding elevated blood pressure.. keep your upcoming appointment with your cardiologist as well.

## 2022-03-08 NOTE — PROGRESS NOTES
SUBJECTIVE:  Quique Lyons is an 66 year old male who presents for cough.  Wife has had pneumonia and was hospitalized.  Pt's sxs started 2 days ago with some nasal congestion.  Then developed a chesty cough.  Coughing some.  Cough feels like some congestion in chest.  No shortness of breath or wheezing.  No fevers, chills, sweats.  No n/v/d.  No skin rashes.  Has taken some advil which helped some.  He took a couple zithromax yesterday.  Does smoke.  Does have htn and is on cozaar.  BP has been in 145-150 range at home.  No recent travel.  Had covid test this morning was negative.  Has used his albuterol inhaler which hasn't really helped.    PMH:   has a past medical history of Atrial fibrillation with RVR (H) (9/11/2020), Cancer (H) (9/2020), Chronic obstructive pulmonary disease with acute exacerbation (HCC) (12/1/2015), and Hypertension (9/2020).  Patient Active Problem List   Diagnosis     Advanced directives, counseling/discussion     Tobacco abuse     Atrial flutter (H)     CRICKET (obstructive sleep apnea)- mild (AHI 8)     Hypertension goal BP (blood pressure) < 140/90     PAF (paroxysmal atrial fibrillation) (H)     Social History     Socioeconomic History     Marital status:      Spouse name: Not on file     Number of children: Not on file     Years of education: Not on file     Highest education level: Not on file   Occupational History     Occupation: [employer]   Tobacco Use     Smoking status: Current Every Day Smoker     Packs/day: 1.00     Years: 40.00     Pack years: 40.00     Types: Cigarettes     Start date: 1973     Smokeless tobacco: Never Used     Tobacco comment: off and on quit for nine years and started again   Vaping Use     Vaping Use: Never used   Substance and Sexual Activity     Alcohol use: Yes     Comment: socially     Drug use: Never     Sexual activity: Yes     Partners: Female     Birth control/protection: None   Other Topics Concern     Parent/sibling w/ CABG, MI or  angioplasty before 65F 55M? No   Social History Narrative     Not on file     Social Determinants of Health     Financial Resource Strain: Not on file   Food Insecurity: Not on file   Transportation Needs: Not on file   Physical Activity: Not on file   Stress: Not on file   Social Connections: Not on file   Intimate Partner Violence: Not on file   Housing Stability: Not on file     Family History   Problem Relation Age of Onset     Dementia Father      Diabetes No family hx of      Coronary Artery Disease No family hx of      Colon Cancer No family hx of        ALLERGIES:  Patient has no known allergies.    Current Outpatient Medications   Medication     albuterol (PROAIR HFA/PROVENTIL HFA/VENTOLIN HFA) 108 (90 Base) MCG/ACT inhaler     losartan (COZAAR) 25 MG tablet     No current facility-administered medications for this visit.         ROS:  ROS is done and is negative for general/constitutional, eye, ENT, Respiratory, cardiovascular, GI, , Skin, musculoskeletal except as noted elsewhere.  All other review of systems negative except as noted elsewhere.      OBJECTIVE:  BP (!) 156/81   Pulse 71   Temp 97.7  F (36.5  C)   Wt 93.5 kg (206 lb 3.2 oz)   SpO2 96%   BMI 31.35 kg/m    GENERAL APPEARANCE: Alert, in no acute distress  EYES: normal  EARS: External ears normal. Canals clear. TM's normal.  NOSE:mild clear discharge and mildly inflamed mucosa  OROPHARYNX:normal  NECK:No adenopathy,masses or thyromegaly  RESP: scattered intermittent coarse upper airway noise with intermittent wheeze with forced expiration  CV:regular rate and rhythm and no murmurs, clicks, or gallops  ABDOMEN: Abdomen soft, non-tender. BS normal. No masses, organomegaly  SKIN: no ulcers, lesions or rash  MUSCULOSKELETAL:Musculoskeletal normal      RESULTS  No results found for any visits on 03/08/22..  No results found for this or any previous visit (from the past 48 hour(s)).    ASSESSMENT/PLAN:    ASSESSMENT / PLAN:  (R05.9) Cough   (primary encounter diagnosis)  Comment: given close recent exposure to pneumonia and pt's hx of copd, will cover for atypicals with zmax.  Also start prednisone as likely starting copd exacerbation  Plan: azithromycin (ZITHROMAX) 250 MG tablet,         predniSONE (DELTASONE) 20 MG tablet        Reviewed medication instructions and side effects. Follow up if experiences side effects.. I reviewed supportive care, otc meds to use if needed, expected course, and signs of concern.  Follow up as needed or if he does not improve within 1 week(s) or if worsens in any way.  Reviewed red flag symptoms and is to go to the ER if experiences any of these.    (J44.9) Chronic obstructive pulmonary disease, unspecified COPD type (H)  Comment: currently appears to be starting an exacerbation due to uri  Plan: azithromycin (ZITHROMAX) 250 MG tablet,         predniSONE (DELTASONE) 20 MG tablet        Reviewed medication instructions and side effects. Follow up if experiences side effects.. I reviewed supportive care, otc meds to use if needed, expected course, and signs of concern.  Follow up as needed or if he does not improve within 1 week(s) or if worsens in any way.  Reviewed red flag symptoms and is to go to the ER if experiences any of these.    (I10) Hypertension goal BP (blood pressure) < 140/90  Comment: bp above goal.  May be in part due to illness, but his home readings have also been above goal and he is following with his cardiologist for that  Plan: continue current med and keep upcoming appt with cardiologist later this month.      PPE worn: mask and shield.    See [facility] for orders, medications, letters, patient instructions    Yenny Maya M.D.

## 2022-03-22 ENCOUNTER — LAB (OUTPATIENT)
Dept: LAB | Facility: CLINIC | Age: 67
End: 2022-03-22
Payer: COMMERCIAL

## 2022-03-22 DIAGNOSIS — E78.5 HYPERLIPIDEMIA LDL GOAL <100: ICD-10-CM

## 2022-03-22 LAB
ALT SERPL W P-5'-P-CCNC: 22 U/L (ref 0–70)
CHOLEST SERPL-MCNC: 236 MG/DL
FASTING STATUS PATIENT QL REPORTED: YES
HDLC SERPL-MCNC: 48 MG/DL
LDLC SERPL CALC-MCNC: 174 MG/DL
NONHDLC SERPL-MCNC: 188 MG/DL
TRIGL SERPL-MCNC: 71 MG/DL

## 2022-03-22 PROCEDURE — 36415 COLL VENOUS BLD VENIPUNCTURE: CPT | Performed by: NURSE PRACTITIONER

## 2022-03-22 PROCEDURE — 80061 LIPID PANEL: CPT | Performed by: NURSE PRACTITIONER

## 2022-03-22 PROCEDURE — 84460 ALANINE AMINO (ALT) (SGPT): CPT | Performed by: NURSE PRACTITIONER

## 2022-03-30 ENCOUNTER — OFFICE VISIT (OUTPATIENT)
Dept: CARDIOLOGY | Facility: CLINIC | Age: 67
End: 2022-03-30
Payer: COMMERCIAL

## 2022-03-30 VITALS
DIASTOLIC BLOOD PRESSURE: 84 MMHG | BODY MASS INDEX: 31.02 KG/M2 | HEART RATE: 70 BPM | SYSTOLIC BLOOD PRESSURE: 155 MMHG | WEIGHT: 204 LBS | OXYGEN SATURATION: 96 % | TEMPERATURE: 96.8 F

## 2022-03-30 DIAGNOSIS — I10 BENIGN ESSENTIAL HYPERTENSION: ICD-10-CM

## 2022-03-30 DIAGNOSIS — I48.3 TYPICAL ATRIAL FLUTTER (H): Primary | ICD-10-CM

## 2022-03-30 DIAGNOSIS — E78.5 HYPERLIPIDEMIA LDL GOAL <100: ICD-10-CM

## 2022-03-30 DIAGNOSIS — I48.0 PAROXYSMAL ATRIAL FIBRILLATION (H): ICD-10-CM

## 2022-03-30 PROCEDURE — 99214 OFFICE O/P EST MOD 30 MIN: CPT | Performed by: NURSE PRACTITIONER

## 2022-03-30 RX ORDER — LOSARTAN POTASSIUM 50 MG/1
50 TABLET ORAL DAILY
Qty: 90 TABLET | Refills: 3 | Status: SHIPPED | OUTPATIENT
Start: 2022-03-30 | End: 2022-06-27

## 2022-03-30 RX ORDER — OMEGA-3 FATTY ACIDS/FISH OIL 300-1000MG
200 CAPSULE ORAL EVERY 4 HOURS PRN
COMMUNITY
End: 2022-05-26

## 2022-03-30 RX ORDER — ROSUVASTATIN CALCIUM 10 MG/1
10 TABLET, COATED ORAL DAILY
Qty: 30 TABLET | Refills: 11 | Status: SHIPPED | OUTPATIENT
Start: 2022-03-30 | End: 2022-06-24

## 2022-03-30 NOTE — LETTER
3/30/2022    Steven Adam PA-C  [address] [address] [address]    RE: Quique Lyons       Dear Colleague,     I had the pleasure of seeing Quique Lyons in the [facility].  Cardiology Clinic Progress Note  Quique Lyons MRN# [id number]   YOB: 1955 Age: 66 year old      Primary Cardiologist:   Dr. Seth/ Dr. Xavier           History of Presenting Illness:      Quique Lyons is a pleasant 66 year old patient with a past cardiac history significant for   1. Atrial flutter s/p ablation   2. Hypertension  3. PFO   Past medical history significant for current tobacco abuse, sleep apnea using CPAP.  He is a monsalve and is .    He was hospitalized in September 2020 with lightheadedness and found to be in atrial flutter with RVR. Echocardiogram showed low normal EF 50 to 55%, possible PFO, and no significant valvular disease.  He converted to sinus rhythm with IV diltiazem.  He was not started on anticoagulation given his [id number] score of 1.  He later underwent flutter ablation in October 2020 which was successful.  He went into a short run of atrial fibrillation after the ablation which was self-limiting with no known recurrence.  He subsequently was diagnosed with obstructive sleep apnea and was started on CPAP.     September 2021, 1 week Zio patch showing showing sinus rhythm with average heart rate 72 bpm, less than 1% burden of paroxysmal atrial fibrillation the longest lasting 67 seconds at a rate of 118, and no symptoms reported.  His lightheadedness correlated with sinus rhythm.  Most of his atrial fibrillation actually occurred while he was sleeping.  Carotid ultrasound November 2021 showing less than 50% stenosis bilaterally.    Pt presents today for 6 month follow-up.  BMP February 2022 showing normal renal function and electrolytes.  Lipid profile with total cholesterol 236 HDL 48  triglycerides 71 and ALT WNL.  Results reviewed today.    He  "was seen by PCP at the beginning of March and found to have COPD exacerbation.  He was started on antibiotics and prednisone.  Blood pressures in clinic and at home have been elevated up to 150 systolic.  He is agreeable to increasing losartan.  He has a documented episode of heart rate up to 120s and 130s, at home, which was short-lived.  He has noted a sensation of his heart \"beating harder\", over the last couple weeks.  He has not been able to check his heart rates during most of these episodes.  He was on vacation and noticed increased alcohol and caffeine intake, which could be contributing.  He would like to monitor this over the next couple weeks and will let us know if it continues.  Given that his lipids have increased further and not improved with lifestyle modification, he is agreeable to starting statin therapy.  He continues using his CPAP consistently.  He notes one episode of heartburn, with no history of heartburn.  This occurred while at rest and improved with Tums.  He will continue to monitor this.  Patient reports no shortness of breath, PND, orthopnea, presyncope, syncope, edema, heart racing.     Current Cardiac Medications   Losartan 25 mg daily                    Assessment and Plan:       Plan    Patient Instructions   Medication Changes:  4. INCREASE losartan to 50 mg daily for HTN   5. START rosuvastatin 10 mg daily     Recommendations:  1. Check blood pressure at least 1 hour after medications. Call the clinic if your blood pressure is consistently greater than 130/80.    2. Call if still having the harder heart beats in 2 weeks, after decreasing caffeine and alcohol.   3. Call if having more heartburn symptoms     Follow-up:  1.  Nonfasting lab in 1-2 weeks (BMP) after increasing losartan   2. Fasting lab in 2 months (lipid/ALT) after starting statin   3. See Pili NIELSEN  for cardiology follow up at [facility]: 2 months reassess palpitations, HTN, \"heartburn\"    Cardiology " Scheduling~[phone]  Cardiology Clinic RN~[phone] (Lexis Singletary, RN and Gabi DENNEY RN)            1. Typical atrial flutter    Hospitalized with typical atrial flutter RVR 9/11/2020 with conversion to sinus rhythm after IV diltiazem    Symptomatic with dizziness and presyncope    S/p flutter ablation 10/2020    Low normal EF    Chads VASC score of 1 for HTN    no need for anticoagulation at this time    Lightheadedness with diltiazem       2.  Hypertension    Not Controlled     Continue losartan     Cough with lisinopril       3.  Paroxysmal atrial fibrillation    self-limiting episode post flutter ablation    Zio patch 9/2021 with less than 1% burden longest lasting 67 seconds    [id number] score 1 for hypertension    No need for anticoagulation at this time given [id number] score less than 2 and low burden less than 1%    Discuss starting anticoagulation when he turns 67 as [id number] score will be 2      4.  Hyperlipidemia    Last  3/2022    Goal LDL less than 100    Start rosuvastatin 10 mg daily           Thank you for allowing me to participate in this delightful patient's care.      This note was completed in part using Dragon voice recognition software. Although reviewed after completion, some word and grammatical errors may occur.    Pili Dong, APRN CNP, APRN, CNP           Data:   All laboratory data reviewed      Total time spent today was 30 mins, reviewing labs, testing, notes, documenting notes, and seeing patient.       Constitutional:  cooperative, alert and oriented, well developed, well nourished, in no acute distress  overweight    Skin:  warm and dry to the touch         Head:  normocephalic       Eyes:  pupils equal and round       ENT:  no pallor or cyanosis       Neck:  no stiffness       Respiratory:  clear to auscultation; normal symmetry, decreased sounds bilaterally   Cardiac: regular rate and rhythm; normal S1 and S2                 pulses full and  equal     GI:  abdomen soft, nondistended     Extremities and Muscular Skeletal:  no edema        Neurological:  affect appropriate; no gross motor deficits       Psych:  Alert and Oriented x 3 , appropriate affact    Thank you for allowing me to participate in the care of your patient.      Sincerely,     CRISTO Boudreaux [facility]  cc:   No referring provider defined for this encounter.

## 2022-03-30 NOTE — PROGRESS NOTES
Cardiology Clinic Progress Note  Quique Lyons MRN# [id number]   YOB: 1955 Age: 66 year old      Primary Cardiologist:   Dr. Seth/ Dr. Xavier           History of Presenting Illness:      Quique Lyons is a pleasant 66 year old patient with a past cardiac history significant for   1. Atrial flutter s/p ablation   2. Hypertension  3. PFO   Past medical history significant for current tobacco abuse, sleep apnea using CPAP.  He is a monsalve and is .    He was hospitalized in September 2020 with lightheadedness and found to be in atrial flutter with RVR. Echocardiogram showed low normal EF 50 to 55%, possible PFO, and no significant valvular disease.  He converted to sinus rhythm with IV diltiazem.  He was not started on anticoagulation given his [id number] score of 1.  He later underwent flutter ablation in October 2020 which was successful.  He went into a short run of atrial fibrillation after the ablation which was self-limiting with no known recurrence.  He subsequently was diagnosed with obstructive sleep apnea and was started on CPAP.     September 2021, 1 week Zio patch showing showing sinus rhythm with average heart rate 72 bpm, less than 1% burden of paroxysmal atrial fibrillation the longest lasting 67 seconds at a rate of 118, and no symptoms reported.  His lightheadedness correlated with sinus rhythm.  Most of his atrial fibrillation actually occurred while he was sleeping.  Carotid ultrasound November 2021 showing less than 50% stenosis bilaterally.    Pt presents today for 6 month follow-up.  BMP February 2022 showing normal renal function and electrolytes.  Lipid profile with total cholesterol 236 HDL 48  triglycerides 71 and ALT WNL.  Results reviewed today.    He was seen by PCP at the beginning of March and found to have COPD exacerbation.  He was started on antibiotics and prednisone.  Blood pressures in clinic and at home have been elevated up to 150 systolic.  He is  "agreeable to increasing losartan.  He has a documented episode of heart rate up to 120s and 130s, at home, which was short-lived.  He has noted a sensation of his heart \"beating harder\", over the last couple weeks.  He has not been able to check his heart rates during most of these episodes.  He was on vacation and noticed increased alcohol and caffeine intake, which could be contributing.  He would like to monitor this over the next couple weeks and will let us know if it continues.  Given that his lipids have increased further and not improved with lifestyle modification, he is agreeable to starting statin therapy.  He continues using his CPAP consistently.  He notes one episode of heartburn, with no history of heartburn.  This occurred while at rest and improved with Tums.  He will continue to monitor this.  Patient reports no shortness of breath, PND, orthopnea, presyncope, syncope, edema, heart racing.     Current Cardiac Medications   Losartan 25 mg daily                    Assessment and Plan:       Plan    Patient Instructions   Medication Changes:  4. INCREASE losartan to 50 mg daily for HTN   5. START rosuvastatin 10 mg daily     Recommendations:  1. Check blood pressure at least 1 hour after medications. Call the clinic if your blood pressure is consistently greater than 130/80.    2. Call if still having the harder heart beats in 2 weeks, after decreasing caffeine and alcohol.   3. Call if having more heartburn symptoms     Follow-up:  1.  Nonfasting lab in 1-2 weeks (BMP) after increasing losartan   2. Fasting lab in 2 months (lipid/ALT) after starting statin   3. See Pili NIELSEN  for cardiology follow up at [facility]: 2 months reassess palpitations, HTN, \"heartburn\"    Cardiology Scheduling~[phone]  Cardiology Clinic RN~[phone] (Lexis Singletary, DIANA and Gabi DENNEY RN)            1. Typical atrial flutter    Hospitalized with typical atrial flutter RVR 9/11/2020 with conversion to sinus rhythm " after IV diltiazem    Symptomatic with dizziness and presyncope    S/p flutter ablation 10/2020    Low normal EF    Chads VASC score of 1 for HTN    no need for anticoagulation at this time    Lightheadedness with diltiazem       2.  Hypertension    Not Controlled     Continue losartan     Cough with lisinopril       3.  Paroxysmal atrial fibrillation    self-limiting episode post flutter ablation    Zio patch 9/2021 with less than 1% burden longest lasting 67 seconds    [id number] score 1 for hypertension    No need for anticoagulation at this time given [id number] score less than 2 and low burden less than 1%    Discuss starting anticoagulation when he turns 67 as [id number] score will be 2      4.  Hyperlipidemia    Last  3/2022    Goal LDL less than 100    Start rosuvastatin 10 mg daily           Thank you for allowing me to participate in this delightful patient's care.      This note was completed in part using Dragon voice recognition software. Although reviewed after completion, some word and grammatical errors may occur.    Pili Dong, APRN CNP, APRN, CNP           Data:   All laboratory data reviewed      Total time spent today was 30 mins, reviewing labs, testing, notes, documenting notes, and seeing patient.       Constitutional:  cooperative, alert and oriented, well developed, well nourished, in no acute distress  overweight    Skin:  warm and dry to the touch         Head:  normocephalic       Eyes:  pupils equal and round       ENT:  no pallor or cyanosis       Neck:  no stiffness       Respiratory:  clear to auscultation; normal symmetry, decreased sounds bilaterally   Cardiac: regular rate and rhythm; normal S1 and S2                 pulses full and equal     GI:  abdomen soft, nondistended     Extremities and Muscular Skeletal:  no edema        Neurological:  affect appropriate; no gross motor deficits       Psych:  Alert and Oriented x 3 , appropriate affact

## 2022-03-30 NOTE — PATIENT INSTRUCTIONS
Medication Changes:  1. INCREASE losartan to 50 mg daily   2. START rosuvastatin 10 mg daily     Recommendations:  1. Check blood pressure at least 1 hour after medications. Call the clinic if your blood pressure is consistently greater than 130/80.    2. Call if still having the harder heart beats in 2 weeks, after decreasing caffeine and alcohol.   3. Call if having more heartburn symptoms     Follow-up:  1.  Nonfasting lab in 1-2 weeks (BMP)  2. Fasting lab in 2 months (lipid/ALT)  3. See Pili NIELSEN  for cardiology follow up at [facility]: 2 months.     Cardiology Scheduling~[phone]  Cardiology Clinic RN~[phone] (Lexis Singletary, RN and Gabi DENNEY RN)

## 2022-04-15 ENCOUNTER — LAB (OUTPATIENT)
Dept: LAB | Facility: CLINIC | Age: 67
End: 2022-04-15
Payer: COMMERCIAL

## 2022-04-15 DIAGNOSIS — I10 BENIGN ESSENTIAL HYPERTENSION: ICD-10-CM

## 2022-04-15 LAB
ANION GAP SERPL CALCULATED.3IONS-SCNC: 2 MMOL/L (ref 3–14)
BUN SERPL-MCNC: 14 MG/DL (ref 7–30)
CALCIUM SERPL-MCNC: 9.4 MG/DL (ref 8.5–10.1)
CHLORIDE BLD-SCNC: 111 MMOL/L (ref 94–109)
CO2 SERPL-SCNC: 30 MMOL/L (ref 20–32)
CREAT SERPL-MCNC: 0.98 MG/DL (ref 0.66–1.25)
GFR SERPL CREATININE-BSD FRML MDRD: 86 ML/MIN/1.73M2
GLUCOSE BLD-MCNC: 88 MG/DL (ref 70–99)
POTASSIUM BLD-SCNC: 4.1 MMOL/L (ref 3.4–5.3)
SODIUM SERPL-SCNC: 143 MMOL/L (ref 133–144)

## 2022-04-15 PROCEDURE — 36415 COLL VENOUS BLD VENIPUNCTURE: CPT | Performed by: NURSE PRACTITIONER

## 2022-04-15 PROCEDURE — 80048 BASIC METABOLIC PNL TOTAL CA: CPT | Performed by: NURSE PRACTITIONER

## 2022-05-26 ENCOUNTER — OFFICE VISIT (OUTPATIENT)
Dept: CARDIOLOGY | Facility: CLINIC | Age: 67
End: 2022-05-26
Attending: NURSE PRACTITIONER
Payer: COMMERCIAL

## 2022-05-26 ENCOUNTER — LAB (OUTPATIENT)
Dept: LAB | Facility: CLINIC | Age: 67
End: 2022-05-26
Payer: COMMERCIAL

## 2022-05-26 VITALS
SYSTOLIC BLOOD PRESSURE: 139 MMHG | DIASTOLIC BLOOD PRESSURE: 70 MMHG | OXYGEN SATURATION: 96 % | WEIGHT: 210 LBS | BODY MASS INDEX: 31.93 KG/M2 | HEART RATE: 72 BPM | RESPIRATION RATE: 16 BRPM

## 2022-05-26 DIAGNOSIS — I48.3 TYPICAL ATRIAL FLUTTER (H): ICD-10-CM

## 2022-05-26 DIAGNOSIS — I10 BENIGN ESSENTIAL HYPERTENSION: Primary | ICD-10-CM

## 2022-05-26 DIAGNOSIS — R12 HEARTBURN: ICD-10-CM

## 2022-05-26 DIAGNOSIS — R00.2 PALPITATIONS: ICD-10-CM

## 2022-05-26 DIAGNOSIS — E78.5 HYPERLIPIDEMIA LDL GOAL <100: ICD-10-CM

## 2022-05-26 DIAGNOSIS — I48.0 PAROXYSMAL ATRIAL FIBRILLATION (H): ICD-10-CM

## 2022-05-26 DIAGNOSIS — R07.9 CHEST PAIN, UNSPECIFIED TYPE: ICD-10-CM

## 2022-05-26 LAB
ALT SERPL W P-5'-P-CCNC: 24 U/L (ref 0–70)
CHOLEST SERPL-MCNC: 134 MG/DL
FASTING STATUS PATIENT QL REPORTED: YES
HDLC SERPL-MCNC: 49 MG/DL
LDLC SERPL CALC-MCNC: 73 MG/DL
NONHDLC SERPL-MCNC: 85 MG/DL
TRIGL SERPL-MCNC: 58 MG/DL

## 2022-05-26 PROCEDURE — 84460 ALANINE AMINO (ALT) (SGPT): CPT | Performed by: NURSE PRACTITIONER

## 2022-05-26 PROCEDURE — 99213 OFFICE O/P EST LOW 20 MIN: CPT | Performed by: NURSE PRACTITIONER

## 2022-05-26 PROCEDURE — 36415 COLL VENOUS BLD VENIPUNCTURE: CPT | Performed by: NURSE PRACTITIONER

## 2022-05-26 PROCEDURE — 80061 LIPID PANEL: CPT | Performed by: NURSE PRACTITIONER

## 2022-05-26 NOTE — PROGRESS NOTES
"Cardiology Clinic Progress Note  Quique Lyons MRN# [id number]   YOB: 1955 Age: 66 year old      Primary Cardiologist:   Dr. Seth/ Dr. Xavier           History of Presenting Illness:      Quique Lyons is a pleasant 66 year old patient with a past cardiac history significant for   1. Atrial flutter s/p ablation   2. Hypertension  3. PFO   Past medical history significant for current tobacco abuse, sleep apnea using CPAP.  He is a monsalve and is .    He was hospitalized in September 2020 with lightheadedness and found to be in atrial flutter with RVR. Echocardiogram showed low normal EF 50 to 55%, possible PFO, and no significant valvular disease.  He converted to sinus rhythm with IV diltiazem.  He was not started on anticoagulation given his [id number] score of 1.  He later underwent flutter ablation in October 2020 which was successful.  He went into a short run of atrial fibrillation after the ablation which was self-limiting with no known recurrence.  He subsequently was diagnosed with obstructive sleep apnea and was started on CPAP.     September 2021, 1 week Zio patch showing showing sinus rhythm with average heart rate 72 bpm, less than 1% burden of paroxysmal atrial fibrillation the longest lasting 67 seconds at a rate of 118, and no symptoms reported.  His lightheadedness correlated with sinus rhythm.  Most of his atrial fibrillation actually occurred while he was sleeping.  Carotid ultrasound November 2021 showing less than 50% stenosis bilaterally.    He was seen in March 2022.  He recently had COPD exacerbation and was started on antibiotics and prednisone.  Blood pressures were elevated to 150 and losartan was increased.  He also had an episode of heart rate up to 120s to 130s at home which resolved spontaneously.  He had palpitations feeling his heart \"beating harder\" over the prior couple weeks but had not checked heart rates during this time.  During this time he had " increased alcohol and caffeine intake as he was on vacation.  He preferred to monitor symptoms and let us know if they continued.  He was agreeable to starting statin therapy given lipids had increased further.  He was using CPAP consistently.  He had an episode of heartburn without history of heartburn but symptoms resolved with Tums.    Pt presents today for 2 month follow-up. BMP 4/15/2022 after increasing losartan shows normal renal function and electrolytes.  Lipid profile today is well controlled after starting statin therapy with LDL 73 and ALT WNL.  Results reviewed today.    He denies any side effects after increasing losartan or starting statin therapy.  His palpitations have improved and are only occurring occasionally.  He has not had any further episodes of tachycardia that he is aware of.  Blood pressure today is controlled.  He has had 2 further episodes of heartburn.  However, these are not associated with eating and cannot find any pattern and can happen at rest or with exertion.  They can last 5 to 10 minutes and he has not tried antiacid with these most recent episodes.  He is agreeable to stress test. Patient reports no shortness of breath, PND, orthopnea, presyncope, syncope, edema, heart racing.     Current Cardiac Medications   Losartan 50 mg daily   Rosuvastatin 10 mg daily                    Assessment and Plan:       Plan    Patient Instructions   Medication Changes:  4. None     Recommendations:  1. Check blood pressure at least 1 hour after medications. Call the clinic if your blood pressure is consistently greater than 140/90.     Follow-up:  1. Call to schedule exercise stress echo- results will come through [employer]   2. See any cardiologist for follow up at [place]: Nov 2022  (difficult to get in to Dr. Xavier so Pt ok changing to a different cardiologist)    Cardiology Scheduling~[phone]  Cardiology Clinic RN~[phone] (Lexis Singletary RN)          1. Typical atrial  flutter    Hospitalized with typical atrial flutter RVR 9/11/2020 with conversion to sinus rhythm after IV diltiazem    Symptomatic with dizziness and presyncope    S/p flutter ablation 10/2020    Low normal EF    Chads VASC score of 1 for HTN    no need for anticoagulation at this time    Lightheadedness with diltiazem       2.  Hypertension    Controlled     Continue losartan     Cough with lisinopril       3.  Paroxysmal atrial fibrillation    self-limiting episode post flutter ablation    Zio patch 9/2021 with less than 1% burden longest lasting 67 seconds    [id number] score 1 for hypertension    No need for anticoagulation at this time given [id number]-CLDb score less than 2 and low burden less than 1%    Discuss starting anticoagulation when he turns 67 as [id number] score will be 2      4.  Hyperlipidemia    Last LDL 73 5/2022     Goal LDL less than 100    Start rosuvastatin 10 mg daily           Thank you for allowing me to participate in this delightful patient's care.      This note was completed in part using Dragon voice recognition software. Although reviewed after completion, some word and grammatical errors may occur.    Pili Dong, APRN CNP, APRN, CNP           Data:   All laboratory data reviewed      Total time spent today was 28 mins, reviewing labs, testing, notes, documenting notes, and seeing patient.       Constitutional:  cooperative, alert and oriented, well developed, well nourished, in no acute distress  overweight    Skin:  warm and dry to the touch         Head:  normocephalic       Eyes:  pupils equal and round       ENT:  no pallor or cyanosis       Neck:  no stiffness       Respiratory:  clear to auscultation; normal symmetry, decreased sounds bilaterally   Cardiac: regular rate and rhythm; normal S1 and S2                 pulses full and equal     GI:  abdomen soft, nondistended     Extremities and Muscular Skeletal:  no edema        Neurological:  affect  appropriate; no gross motor deficits       Psych:  Alert and Oriented x 3 , appropriate affact

## 2022-05-26 NOTE — PATIENT INSTRUCTIONS
Medication Changes:  None     Recommendations:  Check blood pressure at least 1 hour after medications. Call the clinic if your blood pressure is consistently greater than 140/90.     Follow-up:  Call to schedule exercise stress echo[facility]   See Dr. Xavier for cardiology follow up at [facility]: Nov 2022. Call to schedule.     Cardiology Scheduling~[phone]  Cardiology Clinic RN~[phone] (Lexis Singletary RN)

## 2022-05-26 NOTE — LETTER
5/26/2022    Steven Adam PA-C  [address] [address]    RE: Quique Lyons       Dear Colleague,     I had the pleasure of seeing Quique Lyons in the [facility].  Cardiology Clinic Progress Note  Quique Lyons MRN# [id number]   YOB: 1955 Age: 66 year old      Primary Cardiologist:   Dr. Seth/ Dr. Xavier           History of Presenting Illness:      Quique Lyons is a pleasant 66 year old patient with a past cardiac history significant for   1. Atrial flutter s/p ablation   2. Hypertension  3. PFO   Past medical history significant for current tobacco abuse, sleep apnea using CPAP.  He is a monsalve and is .    He was hospitalized in September 2020 with lightheadedness and found to be in atrial flutter with RVR. Echocardiogram showed low normal EF 50 to 55%, possible PFO, and no significant valvular disease.  He converted to sinus rhythm with IV diltiazem.  He was not started on anticoagulation given his [id number] score of 1.  He later underwent flutter ablation in October 2020 which was successful.  He went into a short run of atrial fibrillation after the ablation which was self-limiting with no known recurrence.  He subsequently was diagnosed with obstructive sleep apnea and was started on CPAP.     September 2021, 1 week Zio patch showing showing sinus rhythm with average heart rate 72 bpm, less than 1% burden of paroxysmal atrial fibrillation the longest lasting 67 seconds at a rate of 118, and no symptoms reported.  His lightheadedness correlated with sinus rhythm.  Most of his atrial fibrillation actually occurred while he was sleeping.  Carotid ultrasound November 2021 showing less than 50% stenosis bilaterally.    He was seen in March 2022.  He recently had COPD exacerbation and was started on antibiotics and prednisone.  Blood pressures were elevated to 150 and losartan was increased.  He also had an episode of heart rate up to 120s to  "130s at home which resolved spontaneously.  He had palpitations feeling his heart \"beating harder\" over the prior couple weeks but had not checked heart rates during this time.  During this time he had increased alcohol and caffeine intake as he was on vacation.  He preferred to monitor symptoms and let us know if they continued.  He was agreeable to starting statin therapy given lipids had increased further.  He was using CPAP consistently.  He had an episode of heartburn without history of heartburn but symptoms resolved with Tums.    Pt presents today for 2 month follow-up. BMP 4/15/2022 after increasing losartan shows normal renal function and electrolytes.  Lipid profile today is well controlled after starting statin therapy with LDL 73 and ALT WNL.  Results reviewed today.    He denies any side effects after increasing losartan or starting statin therapy.  His palpitations have improved and are only occurring occasionally.  He has not had any further episodes of tachycardia that he is aware of.  Blood pressure today is controlled.  He has had 2 further episodes of heartburn.  However, these are not associated with eating and cannot find any pattern and can happen at rest or with exertion.  They can last 5 to 10 minutes and he has not tried antiacid with these most recent episodes.  He is agreeable to stress test. Patient reports no shortness of breath, PND, orthopnea, presyncope, syncope, edema, heart racing.     Current Cardiac Medications   Losartan 50 mg daily   Rosuvastatin 10 mg daily                    Assessment and Plan:       Plan    Patient Instructions   Medication Changes:  4. None     Recommendations:  1. Check blood pressure at least 1 hour after medications. Call the clinic if your blood pressure is consistently greater than 140/90.     Follow-up:  1. Call to schedule exercise stress echo- results will come through [employer]   2. See any cardiologist for follow up at [facility]: Nov 2022  " (difficult to get in to Dr. Xavier so Pt ok changing to a different cardiologist)    Cardiology Scheduling~[phone]  Cardiology Clinic RN~[phone] (Lexis Singletary RN)          1. Typical atrial flutter    Hospitalized with typical atrial flutter RVR 9/11/2020 with conversion to sinus rhythm after IV diltiazem    Symptomatic with dizziness and presyncope    S/p flutter ablation 10/2020    Low normal EF    Chads VASC score of 1 for HTN    no need for anticoagulation at this time    Lightheadedness with diltiazem       2.  Hypertension    Controlled     Continue losartan     Cough with lisinopril       3.  Paroxysmal atrial fibrillation    self-limiting episode post flutter ablation    Zio patch 9/2021 with less than 1% burden longest lasting 67 seconds    [id number] score 1 for hypertension    No need for anticoagulation at this time given [id number]-SQEk score less than 2 and low burden less than 1%    Discuss starting anticoagulation when he turns 67 as [id number] score will be 2      4.  Hyperlipidemia    Last LDL 73 5/2022     Goal LDL less than 100    Start rosuvastatin 10 mg daily           Thank you for allowing me to participate in this delightful patient's care.      This note was completed in part using Dragon voice recognition software. Although reviewed after completion, some word and grammatical errors may occur.    Pili Dong, APRN CNP, APRN, CNP           Data:   All laboratory data reviewed      Total time spent today was 28 mins, reviewing labs, testing, notes, documenting notes, and seeing patient.       Constitutional:  cooperative, alert and oriented, well developed, well nourished, in no acute distress  overweight    Skin:  warm and dry to the touch         Head:  normocephalic       Eyes:  pupils equal and round       ENT:  no pallor or cyanosis       Neck:  no stiffness       Respiratory:  clear to auscultation; normal symmetry, decreased sounds bilaterally   Cardiac:  regular rate and rhythm; normal S1 and S2                 pulses full and equal     GI:  abdomen soft, nondistended     Extremities and Muscular Skeletal:  no edema        Neurological:  affect appropriate; no gross motor deficits       Psych:  Alert and Oriented x 3 , appropriate affact    Thank you for allowing me to participate in the care of your patient.      Sincerely,     CRISTO Boudreaux CNP     [facility]  cc:   CRISTO Lopez CNP  [address]  [place] [address]

## 2022-06-24 DIAGNOSIS — I10 BENIGN ESSENTIAL HYPERTENSION: ICD-10-CM

## 2022-06-24 DIAGNOSIS — E78.5 HYPERLIPIDEMIA LDL GOAL <100: ICD-10-CM

## 2022-06-24 RX ORDER — ROSUVASTATIN CALCIUM 10 MG/1
10 TABLET, COATED ORAL DAILY
Qty: 90 TABLET | Refills: 1 | Status: SHIPPED | OUTPATIENT
Start: 2022-06-24 | End: 2022-11-16

## 2022-06-27 RX ORDER — LOSARTAN POTASSIUM 50 MG/1
50 TABLET ORAL DAILY
Qty: 90 TABLET | Refills: 3 | Status: SHIPPED | OUTPATIENT
Start: 2022-06-27 | End: 2022-11-10 | Stop reason: DRUGHIGH

## 2022-06-27 NOTE — TELEPHONE ENCOUNTER
[facility] Cardiology Refill Guideline reviewed.  Medication meets criteria for refill.   Gabi Le RN on 6/27/2022 at 2:33 PM

## 2022-07-01 NOTE — PROGRESS NOTES
Occupational Therapy Discharge - mr. Pat Santos was seen by skilled OT for 6 visits s/p recovery from Rhabdomyolysis (diagnosis). Pt  has been educated on and demonstrates ability to implement energy conservation strategies to reduce SOB and level of exertion with ADL and IADL tasks. Pt complete upper and lower body bathing and dressing ADLs Mod I. Pt Mod I for all functional transfers including toilet and tub transfers. Pt Mod I for item retrieval. Patient verbalized increased functional activity tolerance as measured by the Modified Mita Scale for PRE score of 4/10 . Pt is able to follow UE HEP Ind. Education provided to pt to ensure living area is decluttered for increased safety. Pt able to verbalize understanding and identify > 3 hazards. He has reached maximal potential wish skilled OT at this time. Caregiver is able to independently provide care for pt. No further skilled OT is indicated at this time. [facility] Dermatology Note  Encounter Date: Feb 15, 2022  Office Visit     Dermatology Problem List:  Last skin check 2/15/22, recommended yearly  1. 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 Works outside a lot.  ____________________________________________    Assessment & Plan:    # History of NMSC. No evidence of recurrence.   - Recommend sunscreens SPF #30 or greater, protective clothing and avoidance of tanning beds.   - Recommended yearly skin exams.    # Sun damaged skin with solar lentigines: Chronic, stable.  - Recommend sunscreens SPF #30 or greater, protective clothing and avoidance of tanning beds.    # Benign skin findings including: seborrheic keratoses, cherry angioma - minor, self limited problem  - No further intervention required. Patient to report changes.   - Patient reassured of the benign nature of these lesions.    # Multiple clinically benign nevi: Chronic, stable  - No further intervention required. Patient to report changes.   - Patient reassured of the benign nature of these lesions.    # Skin colored papules of the left lower mucosal lip and right buccal mucosa.  - Most consistent with scar tissue/bite neuromas.  - Patient is scheduled with oral surgeon today. Recommended discussing this at that appointment.    # Plaque psoriasis. Chronic, flared on the bilateral palmar hands.  - Offered topicals. Patient is not bothered by this at this time.    Procedures Performed:   None.    Follow-up: 1 year in-person for skin check, or earlier for new or changing lesions.    Staff and Scribe:     Scribe Disclosure:   IHelen, am serving as a scribe to document services personally performed by this physician, Dr. Edilia Patel, based on data collection and the provider's statements to me.     Provider Disclosure:   The documentation  recorded by the scribe accurately reflects the services I personally performed and the decisions made by me.    Edilia Patel MD    Department of Dermatology  [facility]: Phone: [phone], Fax:[phone]  [facility]: Phone: [phone], Fax: [phone]    ____________________________________________    CC: Skin Check (FBSE. Area of concern-had 3 deer tick bites 3 months ago-2 on neck and thigh, still itchy, 2 spots inside mouth. Hx of NMSC)    HPI:  Mr. Quique Lyons is a(n) 66 year old male who presents today as a return patient for skin check.    Last seen 6/15/21 for skin check. No concerning lesions were noted.    Today, Quique notes having been bit by ticks on the neck and thigh. This happened during deer hunting season in November. The areas are still intermittently itchy. There are note having a root canal in January. There was an infection from the root canal. There have been a couple lesions that appeared after this. He has seen a dentist for this. Quique is also scheduled to see an oral surgeon later today.    Patient is otherwise feeling well, without additional skin concerns.    Labs Reviewed:  N/A    Physical Exam:  Vitals: There were no vitals taken for this visit.  SKIN: Total skin excluding the undergarment areas was performed. The exam included the head/face, neck, both arms, chest, back, abdomen, buttocks, both legs, digits and/or nails.  - Lambert Type III  - Scattered brown macules on sun exposed areas.  - There are dome shaped bright red papules on the trunk.   - Multiple regular brown pigmented macules and papules are identified on the trunk and extremities. About 40 nevi in all. None are atypical.   - There are waxy stuck on tan to brown papules on the trunk.  - Well healed scar on the left nasolabial fold. No pigment recurrence, no nodularity.  - There  is a skin colored papule on the oral mucosa of the left lower inner lip.  - There is a skin colored papule on the right lateral buccal mucosa.  - Psoriasiform skin colored plaques on the bilateral palmar hands  - No other lesions of concern on areas examined.     Medications:  Current Outpatient Medications   Medication     albuterol (PROAIR HFA/PROVENTIL HFA/VENTOLIN HFA) 108 (90 Base) MCG/ACT inhaler     losartan (COZAAR) 25 MG tablet     buPROPion (ZYBAN) 150 MG 12 hr tablet     INCRUSE ELLIPTA 62.5 MCG/INH Inhaler     No current facility-administered medications for this visit.      Past Medical History:   Patient Active Problem List   Diagnosis     Advanced directives, counseling/discussion     Tobacco abuse     Atrial flutter (H)     CRICKET (obstructive sleep apnea)- mild (AHI 8)     Hypertension goal BP (blood pressure) < 140/90     PAF (paroxysmal atrial fibrillation) (H)     Past Medical History:   Diagnosis Date     Atrial fibrillation with RVR (H) 9/11/2020     Chronic obstructive pulmonary disease with acute exacerbation (HCC) 12/1/2015        CC Referred Self, MD  No address on file on close of this encounter.

## 2022-07-03 ENCOUNTER — HEALTH MAINTENANCE LETTER (OUTPATIENT)
Age: 67
End: 2022-07-03

## 2022-10-18 ENCOUNTER — HOSPITAL ENCOUNTER (OUTPATIENT)
Dept: CARDIOLOGY | Facility: CLINIC | Age: 67
Discharge: HOME | End: 2022-10-18
Attending: NURSE PRACTITIONER | Admitting: NURSE PRACTITIONER
Payer: COMMERCIAL

## 2022-10-18 DIAGNOSIS — R12 HEARTBURN: ICD-10-CM

## 2022-10-18 DIAGNOSIS — R07.9 CHEST PAIN, UNSPECIFIED TYPE: ICD-10-CM

## 2022-10-18 PROCEDURE — 93325 DOPPLER ECHO COLOR FLOW MAPG: CPT | Mod: TC

## 2022-10-18 PROCEDURE — 255N000002 HC RX 255 OP 636: Performed by: NURSE PRACTITIONER

## 2022-10-18 PROCEDURE — 93018 CV STRESS TEST I&R ONLY: CPT | Performed by: INTERNAL MEDICINE

## 2022-10-18 PROCEDURE — C8928 TTE W OR W/O FOL W/CON,STRES: HCPCS

## 2022-10-18 PROCEDURE — 93325 DOPPLER ECHO COLOR FLOW MAPG: CPT | Mod: 26 | Performed by: INTERNAL MEDICINE

## 2022-10-18 PROCEDURE — 93016 CV STRESS TEST SUPVJ ONLY: CPT | Performed by: INTERNAL MEDICINE

## 2022-10-18 PROCEDURE — 93321 DOPPLER ECHO F-UP/LMTD STD: CPT | Mod: 26 | Performed by: INTERNAL MEDICINE

## 2022-10-18 PROCEDURE — 93350 STRESS TTE ONLY: CPT | Mod: 26 | Performed by: INTERNAL MEDICINE

## 2022-10-18 RX ADMIN — HUMAN ALBUMIN MICROSPHERES AND PERFLUTREN 2 ML: 10; .22 INJECTION, SOLUTION INTRAVENOUS at 10:05

## 2022-11-03 NOTE — PROGRESS NOTES
"CARDIOLOGY VISIT    REASON FOR VISIT: Atrial arrhythmia    SUBJECTIVE:  67-year-old male seen for atrial flutter.  He has hypertension, COPD, PFO, tobacco abuse, sleep apnea.    2020 he had atrial flutter, he underwent subsequent ablation.  He has had less than 1% A. fib burden subsequently.    Zio monitor August 2021 showed sinus rhythm, less than 1% A. fib with longest episode 67 seconds.    Stress echo October 2022 was normal peak blood pressure 220 systolic.    Overall he has been feeling quite well.  He has some exertional dyspnea, but no chest pain.  He denies any recent palpitations.  Home blood pressure ranges from 140/62 to 174/77, heart rate around 62.    MEDICATIONS:  Current Outpatient Medications   Medication     albuterol (PROAIR HFA/PROVENTIL HFA/VENTOLIN HFA) 108 (90 Base) MCG/ACT inhaler     losartan (COZAAR) 50 MG tablet     rosuvastatin (CRESTOR) 10 MG tablet     No current facility-administered medications for this visit.       ALLERGIES:  No Known Allergies    REVIEW OF SYSTEMS:  Constitutional:  No weight loss, fever, chills  HEENT:  Eyes:  No visual loss, blurred vision, double vision or yellow sclerae. No hearing loss, sneezing, congestion, runny nose or sore throat.  Skin:  No rash or itching.  Cardiovascular: per HPI  Respiratory: per HPI  GI:  No anorexia, nausea, vomiting or diarrhea. No abdominal pain or blood.  :  No dysurea, hematuria  Neurologic:  No headache, paralysis, ataxia, numbness or tingling in the extremities. No change in bowel or bladder control.  Musculoskeletal:  No muscle pain  Hematologic:  No bleeding or bruising.  Lymphatics:  No enlarged nodes. No history of splenectomy.  Endocrine:  No reports of sweating, cold or heat intolerance. No polyuria or polydipsia.  Allergies:  No history of asthma, hives, eczema or rhinitis.    PHYSICAL EXAM:  /75 (BP Location: Right arm, Patient Position: Sitting, Cuff Size: Adult Large)   Pulse 78   Ht 1.727 m (5' 8\")   Wt " 92.1 kg (203 lb)   SpO2 95%   BMI 30.87 kg/m      Constitutional: awake, alert, no distress  Eyes: PERRL, sclera nonicteric  ENT: trachea midline  Respiratory: Lungs clear  Cardiovascular: Regular rate and rhythm, no murmurs  GI: nondistended, nontender, bowel sounds present  Lymph/Hematologic: no lymphadenopathy  Skin: dry, no rash  Musculoskeletal: good muscle tone, strength 5/5 in upper and lower extremities  Neurologic: no focal deficits  Neuropsychiatric: appropriate affact    DATA:  Lab: April 2022: Creatinine 0.9  Recent Labs   Lab Test 05/26/22  0911 03/22/22  1014   CHOL 134 236*   HDL 49 48   LDL 73 174*   TRIG 58 71     ASSESSMENT:  67-year-old male seen for atrial arrhythmia.  He has no more symptoms of A. fib.  On Zio last year he had less than 1% A. fib.  Would not recommend anticoagulation at this point.  His stress echo was normal, although he has hypertension at home and was quite hypertensive on stress echo.  He is agreeable to starting amlodipine.  The risk and benefit of the medication was explained in detail.    RECOMMENDATIONS:  1.  Atrial arrhythmia  -No further medication or testing at this point, low threshold for repeat Zio monitor if any palpitation symptoms return    2.  Hypertension, uncontrolled  -Increase losartan to 100 mg daily, start amlodipine 10 mg daily  -May need third agent such as chlorthalidone    Follow-up in 2 months with MILLER to review blood pressure.    Alejandro Calvo MD  Cardiology - [facility]  Pager:  [phone]  Text Page  November 10, 2022

## 2022-11-10 ENCOUNTER — OFFICE VISIT (OUTPATIENT)
Dept: CARDIOLOGY | Facility: CLINIC | Age: 67
End: 2022-11-10
Attending: NURSE PRACTITIONER
Payer: COMMERCIAL

## 2022-11-10 VITALS
DIASTOLIC BLOOD PRESSURE: 75 MMHG | WEIGHT: 203 LBS | BODY MASS INDEX: 30.77 KG/M2 | HEART RATE: 78 BPM | OXYGEN SATURATION: 95 % | SYSTOLIC BLOOD PRESSURE: 138 MMHG | HEIGHT: 68 IN

## 2022-11-10 DIAGNOSIS — E78.5 HYPERLIPIDEMIA LDL GOAL <100: ICD-10-CM

## 2022-11-10 DIAGNOSIS — I10 BENIGN ESSENTIAL HYPERTENSION: ICD-10-CM

## 2022-11-10 DIAGNOSIS — R07.9 CHEST PAIN, UNSPECIFIED TYPE: ICD-10-CM

## 2022-11-10 DIAGNOSIS — I48.3 TYPICAL ATRIAL FLUTTER (H): ICD-10-CM

## 2022-11-10 DIAGNOSIS — R12 HEARTBURN: ICD-10-CM

## 2022-11-10 DIAGNOSIS — R00.2 PALPITATIONS: ICD-10-CM

## 2022-11-10 DIAGNOSIS — I48.0 PAROXYSMAL ATRIAL FIBRILLATION (H): ICD-10-CM

## 2022-11-10 PROCEDURE — 99214 OFFICE O/P EST MOD 30 MIN: CPT | Performed by: INTERNAL MEDICINE

## 2022-11-10 RX ORDER — LOSARTAN POTASSIUM 100 MG/1
100 TABLET ORAL DAILY
Qty: 90 TABLET | Refills: 3 | Status: SHIPPED | OUTPATIENT
Start: 2022-11-10 | End: 2023-12-08

## 2022-11-10 RX ORDER — AMLODIPINE BESYLATE 10 MG/1
10 TABLET ORAL DAILY
Qty: 90 TABLET | Refills: 3 | Status: SHIPPED | OUTPATIENT
Start: 2022-11-10 | End: 2023-12-08

## 2022-11-10 NOTE — LETTER
11/10/2022    Steven Adam PA-C  [address] [address] [facility] [address]    RE: Quique Lyons       Dear Colleague,     I had the pleasure of seeing Quique Loyns in the [facility].  CARDIOLOGY VISIT    REASON FOR VISIT: Atrial arrhythmia    SUBJECTIVE:  67-year-old male seen for atrial flutter.  He has hypertension, COPD, PFO, tobacco abuse, sleep apnea.    2020 he had atrial flutter, he underwent subsequent ablation.  He has had less than 1% A. fib burden subsequently.    Zio monitor August 2021 showed sinus rhythm, less than 1% A. fib with longest episode 67 seconds.    Stress echo October 2022 was normal peak blood pressure 220 systolic.    Overall he has been feeling quite well.  He has some exertional dyspnea, but no chest pain.  He denies any recent palpitations.  Home blood pressure ranges from 140/62 to 174/77, heart rate around 62.    MEDICATIONS:  Current Outpatient Medications   Medication     albuterol (PROAIR HFA/PROVENTIL HFA/VENTOLIN HFA) 108 (90 Base) MCG/ACT inhaler     losartan (COZAAR) 50 MG tablet     rosuvastatin (CRESTOR) 10 MG tablet     No current facility-administered medications for this visit.       ALLERGIES:  No Known Allergies    REVIEW OF SYSTEMS:  Constitutional:  No weight loss, fever, chills  HEENT:  Eyes:  No visual loss, blurred vision, double vision or yellow sclerae. No hearing loss, sneezing, congestion, runny nose or sore throat.  Skin:  No rash or itching.  Cardiovascular: per HPI  Respiratory: per HPI  GI:  No anorexia, nausea, vomiting or diarrhea. No abdominal pain or blood.  :  No dysurea, hematuria  Neurologic:  No headache, paralysis, ataxia, numbness or tingling in the extremities. No change in bowel or bladder control.  Musculoskeletal:  No muscle pain  Hematologic:  No bleeding or bruising.  Lymphatics:  No enlarged nodes. No history of splenectomy.  Endocrine:  No reports of sweating, cold or heat intolerance. No polyuria or  "polydipsia.  Allergies:  No history of asthma, hives, eczema or rhinitis.    PHYSICAL EXAM:  /75 (BP Location: Right arm, Patient Position: Sitting, Cuff Size: Adult Large)   Pulse 78   Ht 1.727 m (5' 8\")   Wt 92.1 kg (203 lb)   SpO2 95%   BMI 30.87 kg/m      Constitutional: awake, alert, no distress  Eyes: PERRL, sclera nonicteric  ENT: trachea midline  Respiratory: Lungs clear  Cardiovascular: Regular rate and rhythm, no murmurs  GI: nondistended, nontender, bowel sounds present  Lymph/Hematologic: no lymphadenopathy  Skin: dry, no rash  Musculoskeletal: good muscle tone, strength 5/5 in upper and lower extremities  Neurologic: no focal deficits  Neuropsychiatric: appropriate affact    DATA:  Lab: April 2022: Creatinine 0.9  Recent Labs   Lab Test 05/26/22  0911 03/22/22  1014   CHOL 134 236*   HDL 49 48   LDL 73 174*   TRIG 58 71     ASSESSMENT:  67-year-old male seen for atrial arrhythmia.  He has no more symptoms of A. fib.  On Zio last year he had less than 1% A. fib.  Would not recommend anticoagulation at this point.  His stress echo was normal, although he has hypertension at home and was quite hypertensive on stress echo.  He is agreeable to starting amlodipine.  The risk and benefit of the medication was explained in detail.    RECOMMENDATIONS:  1.  Atrial arrhythmia  -No further medication or testing at this point, low threshold for repeat Zio monitor if any palpitation symptoms return    2.  Hypertension, uncontrolled  -Increase losartan to 100 mg daily, start amlodipine 10 mg daily  -May need third agent such as chlorthalidone    Follow-up in 2 months with MILLER to review blood pressure.    Alejandro Calvo MD  Cardiology - [facility]  Pager:  [phone]  Text Page  November 10, 2022    Thank you for allowing me to participate in the care of your patient.      Sincerely,     Alejandro Calvo MD     [facility]  cc: "   Pili Hurst, APRN CNP  [address]  [place] [address]

## 2022-11-10 NOTE — PATIENT INSTRUCTIONS
Increase losartan to 100mg once daily.    Start amlodipine 10mg once daily.     Goal blood pressure is 130/80 or less.

## 2022-11-10 NOTE — NURSING NOTE
"Chief Complaint   Patient presents with     Atrial Fib     Follow up A-Flutter,HTN,PAF,HLD  Follow up Stress test       Vitals:    11/10/22 0931   Pulse: 78   SpO2: 95%   Weight: 92.1 kg (203 lb)   Height: 1.727 m (5' 8\")     Wt Readings from Last 1 Encounters:   11/10/22 92.1 kg (203 lb)       Sonal Hilton MA      "

## 2022-11-16 DIAGNOSIS — E78.5 HYPERLIPIDEMIA LDL GOAL <100: ICD-10-CM

## 2022-11-16 RX ORDER — ROSUVASTATIN CALCIUM 10 MG/1
10 TABLET, COATED ORAL DAILY
Qty: 90 TABLET | Refills: 3 | Status: SHIPPED | OUTPATIENT
Start: 2022-11-16 | End: 2023-12-08

## 2022-11-16 NOTE — TELEPHONE ENCOUNTER
[facility] Cardiology Refill Guideline reviewed.  Medication meets criteria for refill. Refills sent. Lexis Singletary RN Cardiology November 16, 2022, 9:00 AM

## 2022-11-21 ENCOUNTER — MYC MEDICAL ADVICE (OUTPATIENT)
Dept: CARDIOLOGY | Facility: CLINIC | Age: 67
End: 2022-11-21

## 2023-01-11 ENCOUNTER — OFFICE VISIT (OUTPATIENT)
Dept: CARDIOLOGY | Facility: CLINIC | Age: 68
End: 2023-01-11
Attending: INTERNAL MEDICINE
Payer: COMMERCIAL

## 2023-01-11 VITALS
DIASTOLIC BLOOD PRESSURE: 68 MMHG | SYSTOLIC BLOOD PRESSURE: 131 MMHG | BODY MASS INDEX: 31.99 KG/M2 | RESPIRATION RATE: 12 BRPM | WEIGHT: 210.4 LBS | OXYGEN SATURATION: 96 % | HEART RATE: 65 BPM

## 2023-01-11 DIAGNOSIS — Z86.79 H/O ATRIAL FLUTTER: Primary | ICD-10-CM

## 2023-01-11 DIAGNOSIS — I10 BENIGN ESSENTIAL HYPERTENSION: ICD-10-CM

## 2023-01-11 DIAGNOSIS — I48.0 PAROXYSMAL ATRIAL FIBRILLATION (H): ICD-10-CM

## 2023-01-11 PROCEDURE — 99214 OFFICE O/P EST MOD 30 MIN: CPT | Performed by: NURSE PRACTITIONER

## 2023-01-11 NOTE — PATIENT INSTRUCTIONS
Medication Changes:  None     Recommendations:  Check blood pressure at least 1 hour after medications. Call the clinic if your blood pressure is consistently greater than 130/80.      Follow-up:  Call if having more heart racing and we can do a heart monitor   See Dr. Calvo for cardiology follow up at [facility]: July 2023.   Call 6 months prior, to schedule.     Cardiology Scheduling~[phone]  Cardiology Clinic RN~[phone] (Lexis RN, Amanda RN, Ira RN)

## 2023-01-11 NOTE — LETTER
1/11/2023    Steven Adam PA-C  [address] [facility] [address]    RE: Quique Lyons       Dear Colleague,     I had the pleasure of seeing Quique Lyons in the [facility].  Cardiology Clinic Progress Note  Quique Lyons MRN# [id number]   YOB: 1955 Age: 67 year old      Primary Cardiologist:   Dr. Seth/ Dr. Xavier           History of Presenting Illness:      Quique Lyons is a pleasant 67 year old patient with a past cardiac history significant for   1. Atrial flutter/ Atrial fibrillation    Hospitalized with typical atrial flutter RVR 9/11/2020 with conversion to sinus rhythm after IV diltiazem    Symptomatic with dizziness and presyncope    S/p flutter ablation 10/2020  2. Hypertension  3. PFO     Possible PFO seen on echo 2020  Past medical history significant for current tobacco abuse, COPD, sleep apnea using CPAP.  He is a monsalve and is .     Carotid ultrasound November 2021 showing less than 50% stenosis bilaterally.    In 2020 he had atrial flutter with subsequent ablation.  He has had less than 1% atrial fibrillation burden thereafter.  Zio patch August 2021 with sinus rhythm, less than 1% A. fib longest lasting 67 seconds, stress echo October 2022 was normal with peak blood pressure 220.    Patient was seen by Dr. Calvo in November 2022 for routine follow-up.  He noted exertional dyspnea but no palpitations.  Home blood pressures sometimes up to 170 systolic.  Losartan was increased and added amlodipine.  It was noted that anticoagulation would not be recommended at this point given his low burden.  If return of palpitations would repeat Zio patch.    Pt presents today for 2 month follow-up.  Blood pressure today is 131/68.  He denies any side effects with medication changes.  Home blood pressures have been controlled.  At the end of November he had an episode of heart racing with heart rate up to 140s lasting about 20 to 25 minutes.  He has  not had any further episodes since that.  He has had a couple times where he felt his heart beating harder but not racing.  I have asked him to monitor this and if this recurs we can do Zio patch.  He remains off of anticoagulation due to his low atrial fibrillation burden. Patient reports no chest pain, shortness of breath, PND, orthopnea, presyncope, syncope, edema.       Current Cardiac Medications   Amlodipine 10 mg daily  Losartan 100 mg daily   Rosuvastatin 10 mg daily                    Assessment and Plan:       Plan    Patient Instructions   Medication Changes:  4. None     Recommendations:  1. Check blood pressure at least 1 hour after medications. Call the clinic if your blood pressure is consistently greater than 130/80.      Follow-up:  1. Call if having more heart racing and we can do a heart monitor   2. See Dr. Calvo for cardiology follow up at [facility]: July 2023.   Call 6 months prior, to schedule.     Cardiology Scheduling~[phone]  Cardiology Clinic RN~[phone] (Lexis RN, Amanda RN, Ira RN)          1. Typical atrial flutter    Low normal EF    Chads VASC score of 1 for HTN    no need for anticoagulation at this time    Lightheadedness with diltiazem       2.  Hypertension    Controlled      Continue losartan, amlodipine    Cough with lisinopril       3.  Paroxysmal atrial fibrillation    self-limiting episode post flutter ablation    Zio patch 9/2021 with less than 1% burden longest lasting 67 seconds    [id number] score 2 for hypertension and age     No need for anticoagulation at this time given low burden less than 1%       4.  Hyperlipidemia    Last LDL 73 5/2022     Goal LDL less than 100    Continue rosuvastatin 10 mg daily           Thank you for allowing me to participate in this delightful patient's care.      This note was completed in part using Dragon voice recognition software. Although reviewed after completion, some word and grammatical errors may  occur.    CRISTO Boudreaux CNP, CRISTO, CNP           Data:   All laboratory data reviewed         Constitutional:  cooperative, alert and oriented, well developed, well nourished, in no acute distress  overweight    Skin:  warm and dry to the touch         Head:  normocephalic       Eyes:  pupils equal and round       ENT:  no pallor or cyanosis       Neck:  no stiffness       Respiratory:  clear to auscultation; normal symmetry, decreased sounds bilaterally   Cardiac: regular rate and rhythm; normal S1 and S2                 pulses full and equal     GI:  abdomen soft, nondistended     Extremities and Muscular Skeletal:  no edema        Neurological:  affect appropriate; no gross motor deficits       Psych:  Alert and Oriented x 3 , appropriate affact      Thank you for allowing me to participate in the care of your patient.      Sincerely,     CRISTO Boudreaux CNP     [facility]  cc:   Alejandro Calvo MD  [facility]  [address]  [place] [address]

## 2023-01-11 NOTE — PROGRESS NOTES
Cardiology Clinic Progress Note  Quique Lyons MRN# [id number]   YOB: 1955 Age: 67 year old      Primary Cardiologist:   Dr. Seth/ Dr. Xavier           History of Presenting Illness:      Quique Lyons is a pleasant 67 year old patient with a past cardiac history significant for   1. Atrial flutter/ Atrial fibrillation    Hospitalized with typical atrial flutter RVR 9/11/2020 with conversion to sinus rhythm after IV diltiazem    Symptomatic with dizziness and presyncope    S/p flutter ablation 10/2020  2. Hypertension  3. PFO     Possible PFO seen on echo 2020  Past medical history significant for current tobacco abuse, COPD, sleep apnea using CPAP.  He is a monsalve and is .     Carotid ultrasound November 2021 showing less than 50% stenosis bilaterally.    In 2020 he had atrial flutter with subsequent ablation.  He has had less than 1% atrial fibrillation burden thereafter.  Zio patch August 2021 with sinus rhythm, less than 1% A. fib longest lasting 67 seconds, stress echo October 2022 was normal with peak blood pressure 220.    Patient was seen by Dr. Calvo in November 2022 for routine follow-up.  He noted exertional dyspnea but no palpitations.  Home blood pressures sometimes up to 170 systolic.  Losartan was increased and added amlodipine.  It was noted that anticoagulation would not be recommended at this point given his low burden.  If return of palpitations would repeat Zio patch.    Pt presents today for 2 month follow-up.  Blood pressure today is 131/68.  He denies any side effects with medication changes.  Home blood pressures have been controlled.  At the end of November he had an episode of heart racing with heart rate up to 140s lasting about 20 to 25 minutes.  He has not had any further episodes since that.  He has had a couple times where he felt his heart beating harder but not racing.  I have asked him to monitor this and if this recurs we can do Zio patch.  He remains off  of anticoagulation due to his low atrial fibrillation burden. Patient reports no chest pain, shortness of breath, PND, orthopnea, presyncope, syncope, edema.       Current Cardiac Medications   Amlodipine 10 mg daily  Losartan 100 mg daily   Rosuvastatin 10 mg daily                    Assessment and Plan:       Plan    Patient Instructions   Medication Changes:  4. None     Recommendations:  1. Check blood pressure at least 1 hour after medications. Call the clinic if your blood pressure is consistently greater than 130/80.      Follow-up:  1. Call if having more heart racing and we can do a heart monitor   2. See Dr. Calvo for cardiology follow up at [facility]: July 2023.   Call 6 months prior, to schedule.     Cardiology Scheduling~[phone]  Cardiology Clinic RN~[phone] (Lexis RN, Amanda RN, Ira RN)          1. Typical atrial flutter    Low normal EF    Chads VASC score of 1 for HTN    no need for anticoagulation at this time    Lightheadedness with diltiazem       2.  Hypertension    Controlled      Continue losartan, amlodipine    Cough with lisinopril       3.  Paroxysmal atrial fibrillation    self-limiting episode post flutter ablation    Zio patch 9/2021 with less than 1% burden longest lasting 67 seconds    [id number] score 2 for hypertension and age     No need for anticoagulation at this time given low burden less than 1%       4.  Hyperlipidemia    Last LDL 73 5/2022     Goal LDL less than 100    Continue rosuvastatin 10 mg daily           Thank you for allowing me to participate in this delightful patient's care.      This note was completed in part using Dragon voice recognition software. Although reviewed after completion, some word and grammatical errors may occur.    Pili Dong, APRN CNP, APRN, CNP           Data:   All laboratory data reviewed         Constitutional:  cooperative, alert and oriented, well developed, well nourished, in no acute distress   overweight    Skin:  warm and dry to the touch         Head:  normocephalic       Eyes:  pupils equal and round       ENT:  no pallor or cyanosis       Neck:  no stiffness       Respiratory:  clear to auscultation; normal symmetry, decreased sounds bilaterally   Cardiac: regular rate and rhythm; normal S1 and S2                 pulses full and equal     GI:  abdomen soft, nondistended     Extremities and Muscular Skeletal:  no edema        Neurological:  affect appropriate; no gross motor deficits       Psych:  Alert and Oriented x 3 , appropriate affact

## 2023-04-23 ENCOUNTER — HEALTH MAINTENANCE LETTER (OUTPATIENT)
Age: 68
End: 2023-04-23

## 2023-07-16 ENCOUNTER — HEALTH MAINTENANCE LETTER (OUTPATIENT)
Age: 68
End: 2023-07-16

## 2023-08-11 ENCOUNTER — MYC MEDICAL ADVICE (OUTPATIENT)
Dept: FAMILY MEDICINE | Facility: CLINIC | Age: 68
End: 2023-08-11
Payer: COMMERCIAL

## 2023-08-11 DIAGNOSIS — L98.9 SKIN LESION: Primary | ICD-10-CM

## 2023-08-17 ENCOUNTER — OFFICE VISIT (OUTPATIENT)
Dept: DERMATOLOGY | Facility: CLINIC | Age: 68
End: 2023-08-17
Attending: PHYSICIAN ASSISTANT
Payer: COMMERCIAL

## 2023-08-17 DIAGNOSIS — D22.9 MULTIPLE BENIGN NEVI: ICD-10-CM

## 2023-08-17 DIAGNOSIS — L81.4 LENTIGO: ICD-10-CM

## 2023-08-17 DIAGNOSIS — Z85.828 HISTORY OF BASAL CELL CANCER: ICD-10-CM

## 2023-08-17 DIAGNOSIS — D18.01 CHERRY ANGIOMA: ICD-10-CM

## 2023-08-17 DIAGNOSIS — L82.1 SEBORRHEIC KERATOSIS: Primary | ICD-10-CM

## 2023-08-17 DIAGNOSIS — D48.5 NEOPLASM OF UNCERTAIN BEHAVIOR OF SKIN: ICD-10-CM

## 2023-08-17 PROCEDURE — 88305 TISSUE EXAM BY PATHOLOGIST: CPT | Performed by: DERMATOLOGY

## 2023-08-17 PROCEDURE — 11102 TANGNTL BX SKIN SINGLE LES: CPT | Performed by: PHYSICIAN ASSISTANT

## 2023-08-17 PROCEDURE — 99213 OFFICE O/P EST LOW 20 MIN: CPT | Mod: 25 | Performed by: PHYSICIAN ASSISTANT

## 2023-08-17 PROCEDURE — 11103 TANGNTL BX SKIN EA SEP/ADDL: CPT | Performed by: PHYSICIAN ASSISTANT

## 2023-08-17 ASSESSMENT — PAIN SCALES - GENERAL: PAINLEVEL: NO PAIN (1)

## 2023-08-17 NOTE — PROGRESS NOTES
Quique Lyons is an extremely pleasant 68 year old year old male patient here today for skin check. He notes are that recently started on right superior shoulder, initially thought it was bug bite. Slightly tender itchy.  Patient has no other skin complaints today.  Remainder of the HPI, Meds, PMH, Allergies, FH, and SH was reviewed in chart.    Pertinent Hx:   History of BCC  Past Medical History:   Diagnosis Date    Atrial fibrillation with RVR (H) 09/11/2020    Basal cell carcinoma     Cancer (H) 09/2020    skin cancer    Chronic obstructive pulmonary disease with acute exacerbation (HCC) 12/01/2015    Hypertension 09/2020       Past Surgical History:   Procedure Laterality Date    CARDIAC SURGERY      EP ABLATION ATRIAL FLUTTER N/A 10/16/2020    Procedure: EP Ablation Atrial Flutter;  Surgeon: Angeli Seth MD;  Location:  HEART CARDIAC CATH LAB        Family History   Problem Relation Age of Onset    Dementia Father     Diabetes No family hx of     Coronary Artery Disease No family hx of     Colon Cancer No family hx of        Social History     Socioeconomic History    Marital status:      Spouse name: Not on file    Number of children: Not on file    Years of education: Not on file    Highest education level: Not on file   Occupational History    Occupation: [employer]   Tobacco Use    Smoking status: Every Day     Packs/day: 1.00     Years: 40.00     Pack years: 40.00     Types: Cigarettes     Start date: 1973    Smokeless tobacco: Never    Tobacco comments:     1 PPD 3/30/22   Vaping Use    Vaping Use: Never used   Substance and Sexual Activity    Alcohol use: Yes     Comment: socially    Drug use: Never    Sexual activity: Yes     Partners: Female     Birth control/protection: None   Other Topics Concern    Parent/sibling w/ CABG, MI or angioplasty before 65F 55M? No   Social History Narrative    Not on file     Social Determinants of Health     Financial Resource Strain: Not on  file   Food Insecurity: Not on file   Transportation Needs: Not on file   Physical Activity: Not on file   Stress: Not on file   Social Connections: Not on file   Intimate Partner Violence: Not on file   Housing Stability: Not on file       Outpatient Encounter Medications as of 8/17/2023   Medication Sig Dispense Refill    amLODIPine (NORVASC) 10 MG tablet Take 1 tablet (10 mg) by mouth daily 90 tablet 3    losartan (COZAAR) 100 MG tablet Take 1 tablet (100 mg) by mouth daily 90 tablet 3    albuterol (PROAIR HFA/PROVENTIL HFA/VENTOLIN HFA) 108 (90 Base) MCG/ACT inhaler Inhale 2 puffs into the lungs every 6 hours (Patient not taking: Reported on 8/17/2023) 8 g 1    rosuvastatin (CRESTOR) 10 MG tablet Take 1 tablet (10 mg) by mouth daily (Patient not taking: Reported on 8/17/2023) 90 tablet 3     No facility-administered encounter medications on file as of 8/17/2023.             O:   NAD, WDWN, Alert & Oriented, Mood & Affect wnl, Vitals stable   Here today alone   There were no vitals taken for this visit.   General appearance normal   Vitals stable   Alert, oriented and in no acute distress      0.3 cm brownish papule on right superior shoulder   0.5 cm brown irregular macle on right posterior shoulder   Stuck on papules and brown macules on trunk and ext   Red papules on trunk  Brown papules and macules with regular pigment network and border on torso and extremities      The remainder of skin exam is normal       Eyes: Conjunctivae/lids:Normal     ENT: Lips: normal    MSK:Normal    Cardiovascular: peripheral edema none    Pulm: Breathing Normal    Neuro/Psych: Orientation:Alert and Orientedx3 ; Mood/Affect:normal   A/P:  1. R/O ISK vs SCC on right superior shoulder   TANGENTIAL BIOPSY SENT OUT:  After consent, anesthesia with LEC and prep, tangential excision performed and specimen sent out for permanent section histology.  No complications and routine wound care. Patient told to call our office in 1-2 weeks for  result.    2  2. R/O lentigo vs mis on right posterior shoulder  TANGENTIAL BIOPSY SENT OUT:  After consent, anesthesia with LEC and prep, tangential excision performed and specimen sent out for permanent section histology.  No complications and routine wound care. Patient told to call our office in 1-2 weeks for result.      3. Seborrheic keratosis, lentigo, angioma, benign nevi, history of BCC  It was a pleasure speaking to Quique Lyons today.  BENIGN LESIONS DISCUSSED WITH PATIENT:  I discussed the specifics of tumor, prognosis, and genetics of benign lesions.  I explained that treatment of these lesions would be purely cosmetic and not medically neccessary.  I discussed with patient different removal options including excision, cautery and /or laser.      Nature and genetics of benign skin lesions dicussed with patient.  Signs and Symptoms of skin cancer discussed with patient.  ABCDEs of melanoma reviewed with patient.  Patient encouraged to perform monthly skin exams.  UV precautions reviewed with patient.  Risks of non-melanoma skin cancer discussed with patient   Return to clinic pending biopsy results.

## 2023-08-17 NOTE — PATIENT INSTRUCTIONS
Wound Care Instructions     FOR SUPERFICIAL WOUNDS     [facility] [phone]    [facility] [phone]                       AFTER 24 HOURS YOU SHOULD REMOVE THE BANDAGE AND BEGIN DAILY DRESSING CHANGES AS FOLLOWS:     1) Remove Dressing.     2) Clean and dry the area with tap water using a Q-tip or sterile gauze pad.     3) Apply Vaseline, Aquaphor, Polysporin ointment or Bacitracin ointment over entire wound.  Do NOT use Neosporin ointment.     4) Cover the wound with a band-aid, or a sterile non-stick gauze pad and micropore paper tape      REPEAT THESE INSTRUCTIONS AT LEAST ONCE A DAY UNTIL THE WOUND HAS COMPLETELY HEALED.    It is an old wives tale that a wound heals better when it is exposed to air and allowed to dry out. The wound will heal faster with a better cosmetic result if it is kept moist with ointment and covered with a bandage.    **Do not let the wound dry out.**      Supplies Needed:      *Cotton tipped applicators (Q-tips)    *Polysporin Ointment or Bacitracin Ointment (NOT NEOSPORIN)    *Band-aids or non-stick gauze pads and micropore paper tape.      PATIENT INFORMATION:    During the healing process you will notice a number of changes. All wounds develop a small halo of redness surrounding the wound.  This means healing is occurring. Severe itching with extensive redness usually indicates sensitivity to the ointment or bandage tape used to dress the wound.  You should call our office if this develops.      Swelling  and/or discoloration around your surgical site is common, particularly when performed around the eye.    All wounds normally drain.  The larger the wound the more drainage there will be.  After 7-10 days, you will notice the wound beginning to shrink and new skin will begin to grow.  The wound is healed when you can see skin has formed over the entire area.  A healed wound has a healthy, shiny look to the surface and is red to dark pink in color  to normalize.  Wounds may take approximately 4-6 weeks to heal.  Larger wounds may take 6-8 weeks.  After the wound is healed you may discontinue dressing changes.    You may experience a sensation of tightness as your wound heals. This is normal and will gradually subside.    Your healed wound may be sensitive to temperature changes. This sensitivity improves with time, but if you re having a lot of discomfort, try to avoid temperature extremes.    Patients frequently experience itching after their wound appears to have healed because of the continue healing under the skin.  Plain Vaseline will help relieve the itching.        POSSIBLE COMPLICATIONS    BLEEDING:    Leave the bandage in place.  Use tightly rolled up gauze or a cloth to apply direct pressure over the bandage for 30  minutes.  Reapply pressure for an additional 30 minutes if necessary  Use additional gauze and tape to maintain pressure once the bleeding has stopped.

## 2023-08-17 NOTE — LETTER
8/17/2023         RE: Quique Lyons  [id number] [facility] [id number]        Dear Colleague,    Thank you for referring your patient, Quique Lyons, to the [facility]. Please see a copy of my visit note below.    Quique Lyons is an extremely pleasant 68 year old year old male patient here today for skin check. He notes are that recently started on right superior shoulder, initially thought it was bug bite. Slightly tender itchy.  Patient has no other skin complaints today.  Remainder of the HPI, Meds, PMH, Allergies, FH, and SH was reviewed in chart.    Pertinent Hx:   History of BCC  Past Medical History:   Diagnosis Date     Atrial fibrillation with RVR (H) 09/11/2020     Basal cell carcinoma      Cancer (H) 09/2020    skin cancer     Chronic obstructive pulmonary disease with acute exacerbation (HCC) 12/01/2015     Hypertension 09/2020       Past Surgical History:   Procedure Laterality Date     CARDIAC SURGERY       EP ABLATION ATRIAL FLUTTER N/A 10/16/2020    Procedure: EP Ablation Atrial Flutter;  Surgeon: Angeli Seth MD;  Location:  [facility] CATH LAB        Family History   Problem Relation Age of Onset     Dementia Father      Diabetes No family hx of      Coronary Artery Disease No family hx of      Colon Cancer No family hx of        Social History     Socioeconomic History     Marital status:      Spouse name: Not on file     Number of children: Not on file     Years of education: Not on file     Highest education level: Not on file   Occupational History     Occupation: [employer]   Tobacco Use     Smoking status: Every Day     Packs/day: 1.00     Years: 40.00     Pack years: 40.00     Types: Cigarettes     Start date: 1973     Smokeless tobacco: Never     Tobacco comments:     1 PPD 3/30/22   Vaping Use     Vaping Use: Never used   Substance and Sexual Activity     Alcohol use: Yes     Comment: socially     Drug use: Never      Sexual activity: Yes     Partners: Female     Birth control/protection: None   Other Topics Concern     Parent/sibling w/ CABG, MI or angioplasty before 65F 55M? No   Social History Narrative     Not on file     Social Determinants of Health     Financial Resource Strain: Not on file   Food Insecurity: Not on file   Transportation Needs: Not on file   Physical Activity: Not on file   Stress: Not on file   Social Connections: Not on file   Intimate Partner Violence: Not on file   Housing Stability: Not on file       Outpatient Encounter Medications as of 8/17/2023   Medication Sig Dispense Refill     amLODIPine (NORVASC) 10 MG tablet Take 1 tablet (10 mg) by mouth daily 90 tablet 3     losartan (COZAAR) 100 MG tablet Take 1 tablet (100 mg) by mouth daily 90 tablet 3     albuterol (PROAIR HFA/PROVENTIL HFA/VENTOLIN HFA) 108 (90 Base) MCG/ACT inhaler Inhale 2 puffs into the lungs every 6 hours (Patient not taking: Reported on 8/17/2023) 8 g 1     rosuvastatin (CRESTOR) 10 MG tablet Take 1 tablet (10 mg) by mouth daily (Patient not taking: Reported on 8/17/2023) 90 tablet 3     No facility-administered encounter medications on file as of 8/17/2023.             O:   NAD, WDWN, Alert & Oriented, Mood & Affect wnl, Vitals stable   Here today alone   There were no vitals taken for this visit.   General appearance normal   Vitals stable   Alert, oriented and in no acute distress      0.3 cm brownish papule on right superior shoulder   0.5 cm brown irregular macle on right posterior shoulder   Stuck on papules and brown macules on trunk and ext   Red papules on trunk  Brown papules and macules with regular pigment network and border on torso and extremities      The remainder of skin exam is normal       Eyes: Conjunctivae/lids:Normal     ENT: Lips: normal    MSK:Normal    Cardiovascular: peripheral edema none    Pulm: Breathing Normal    Neuro/Psych: Orientation:Alert and Orientedx3 ; Mood/Affect:normal   A/P:  1. R/O ISK vs  SCC on right superior shoulder   TANGENTIAL BIOPSY SENT OUT:  After consent, anesthesia with LEC and prep, tangential excision performed and specimen sent out for permanent section histology.  No complications and routine wound care. Patient told to call our office in 1-2 weeks for result.    2  2. R/O lentigo vs mis on right posterior shoulder  TANGENTIAL BIOPSY SENT OUT:  After consent, anesthesia with LEC and prep, tangential excision performed and specimen sent out for permanent section histology.  No complications and routine wound care. Patient told to call our office in 1-2 weeks for result.      3. Seborrheic keratosis, lentigo, angioma, benign nevi, history of BCC  It was a pleasure speaking to Quique Lyons today.  BENIGN LESIONS DISCUSSED WITH PATIENT:  I discussed the specifics of tumor, prognosis, and genetics of benign lesions.  I explained that treatment of these lesions would be purely cosmetic and not medically neccessary.  I discussed with patient different removal options including excision, cautery and /or laser.      Nature and genetics of benign skin lesions dicussed with patient.  Signs and Symptoms of skin cancer discussed with patient.  ABCDEs of melanoma reviewed with patient.  Patient encouraged to perform monthly skin exams.  UV precautions reviewed with patient.  Risks of non-melanoma skin cancer discussed with patient   Return to clinic pending biopsy results.       Again, thank you for allowing me to participate in the care of your patient.        Sincerely,        Farrah Westbrook PA-C

## 2023-08-22 LAB
PATH REPORT.COMMENTS IMP SPEC: NORMAL
PATH REPORT.COMMENTS IMP SPEC: NORMAL
PATH REPORT.FINAL DX SPEC: NORMAL
PATH REPORT.GROSS SPEC: NORMAL
PATH REPORT.MICROSCOPIC SPEC OTHER STN: NORMAL
PATH REPORT.RELEVANT HX SPEC: NORMAL

## 2023-12-08 ENCOUNTER — MYC REFILL (OUTPATIENT)
Dept: CARDIOLOGY | Facility: CLINIC | Age: 68
End: 2023-12-08
Payer: COMMERCIAL

## 2023-12-08 DIAGNOSIS — E78.5 HYPERLIPIDEMIA LDL GOAL <100: ICD-10-CM

## 2023-12-08 DIAGNOSIS — I10 BENIGN ESSENTIAL HYPERTENSION: ICD-10-CM

## 2023-12-08 RX ORDER — AMLODIPINE BESYLATE 10 MG/1
10 TABLET ORAL DAILY
Qty: 90 TABLET | Refills: 0 | Status: SHIPPED | OUTPATIENT
Start: 2023-12-08 | End: 2024-03-07

## 2023-12-08 RX ORDER — LOSARTAN POTASSIUM 100 MG/1
100 TABLET ORAL DAILY
Qty: 90 TABLET | Refills: 0 | Status: SHIPPED | OUTPATIENT
Start: 2023-12-08 | End: 2024-03-07

## 2023-12-08 RX ORDER — ROSUVASTATIN CALCIUM 10 MG/1
10 TABLET, COATED ORAL DAILY
Qty: 90 TABLET | Refills: 0 | Status: SHIPPED | OUTPATIENT
Start: 2023-12-08 | End: 2024-03-04

## 2023-12-08 NOTE — TELEPHONE ENCOUNTER
[facility] Cardiology Refill Guideline reviewed.  Medication does not meet criteria for refill due to overdue for follow up.  Messaged to providers team for further review. 90 day fill given and pt asked to call for follow up. Lexis Singletary RN Cardiology December 8, 2023, 3:30 PM

## 2023-12-21 ENCOUNTER — ANCILLARY PROCEDURE (OUTPATIENT)
Dept: GENERAL RADIOLOGY | Facility: CLINIC | Age: 68
End: 2023-12-21
Attending: PHYSICIAN ASSISTANT
Payer: COMMERCIAL

## 2023-12-21 ENCOUNTER — OFFICE VISIT (OUTPATIENT)
Dept: URGENT CARE | Facility: URGENT CARE | Age: 68
End: 2023-12-21
Payer: COMMERCIAL

## 2023-12-21 VITALS
TEMPERATURE: 99 F | HEART RATE: 76 BPM | WEIGHT: 198 LBS | OXYGEN SATURATION: 93 % | DIASTOLIC BLOOD PRESSURE: 82 MMHG | BODY MASS INDEX: 30.11 KG/M2 | RESPIRATION RATE: 22 BRPM | SYSTOLIC BLOOD PRESSURE: 169 MMHG

## 2023-12-21 DIAGNOSIS — J10.1 INFLUENZA A: Primary | ICD-10-CM

## 2023-12-21 DIAGNOSIS — R05.9 COUGH: ICD-10-CM

## 2023-12-21 DIAGNOSIS — R68.83 CHILLS: ICD-10-CM

## 2023-12-21 DIAGNOSIS — R05.1 ACUTE COUGH: ICD-10-CM

## 2023-12-21 LAB
FLUAV AG SPEC QL IA: POSITIVE
FLUBV AG SPEC QL IA: NEGATIVE

## 2023-12-21 PROCEDURE — 87804 INFLUENZA ASSAY W/OPTIC: CPT | Performed by: PHYSICIAN ASSISTANT

## 2023-12-21 PROCEDURE — 99214 OFFICE O/P EST MOD 30 MIN: CPT | Performed by: PHYSICIAN ASSISTANT

## 2023-12-21 PROCEDURE — 71046 X-RAY EXAM CHEST 2 VIEWS: CPT | Mod: TC | Performed by: RADIOLOGY

## 2023-12-21 RX ORDER — PREDNISONE 20 MG/1
40 TABLET ORAL DAILY
Qty: 10 TABLET | Refills: 0 | Status: SHIPPED | OUTPATIENT
Start: 2023-12-21 | End: 2023-12-26

## 2023-12-21 RX ORDER — ALBUTEROL SULFATE 90 UG/1
2 AEROSOL, METERED RESPIRATORY (INHALATION) EVERY 4 HOURS PRN
Qty: 18 G | Refills: 0 | Status: SHIPPED | OUTPATIENT
Start: 2023-12-21

## 2023-12-21 RX ORDER — OSELTAMIVIR PHOSPHATE 75 MG/1
75 CAPSULE ORAL 2 TIMES DAILY
Qty: 10 CAPSULE | Refills: 0 | Status: SHIPPED | OUTPATIENT
Start: 2023-12-21 | End: 2023-12-26

## 2023-12-22 NOTE — PATIENT INSTRUCTIONS
Start Tamiflu (antiviral medicine) twice a day for 5 days with food  If symptoms have not significantly improved in 2 days, start prednisone 40mg daily for 5 days  Drink plenty of fluids and rest  Tylenol or ibuprofen as needed for aches and fever   Stay home and away from others until you have been fever free for 24 hours without medications.  Be seen in person at urgent care if your symptoms do not improve after 10 days.  Continue to receive the influenza vaccine yearly.

## 2023-12-22 NOTE — PROGRESS NOTES
Assessment & Plan     Influenza A  - oseltamivir (TAMIFLU) 75 MG capsule; Take 1 capsule (75 mg) by mouth 2 times daily for 5 days  - predniSONE (DELTASONE) 20 MG tablet; Take 2 tablets (40 mg) by mouth daily for 5 days  - albuterol (PROAIR HFA/PROVENTIL HFA/VENTOLIN HFA) 108 (90 Base) MCG/ACT inhaler; Inhale 2 puffs into the lungs every 4 hours as needed for shortness of breath or wheezing    Chills  - Influenza A & B Antigen - Clinic Collect    Acute cough  - XR Chest 2 Views; Future    Influenza A positive, chest xray normal by my read, radiology read negative. Treat with Tamiflu.  Albuterol as needed.  If symptoms do not improve in 48 hours, start prednisone.  Follow-up to be seen if symptoms significantly worsen or fail to improve.    Return in about 1 week (around 12/28/2023) for visit with primary care provider if not improving.     TYRON Hebert [facility]    Subjective   Quique Lyons is a 68 year old who presents for the following health issues     Patient presents with:  Urgent Care  Cough: Per patient states he has been having symptoms for a couple of days, cough, wheezing, SOB, and chills      HPI    Quique   Cough in chest x 3 days  Chills/sweats  sob  Runny nose    Review of Systems   ROS negative except as stated above.      Objective    BP (!) 169/82   Pulse 76   Temp 99  F (37.2  C) (Tympanic)   Resp 22   Wt 89.8 kg (198 lb)   SpO2 93%   BMI 30.11 kg/m    Physical Exam   GENERAL: healthy, alert and no distress  EYES: Eyes grossly normal to inspection, PERRL and conjunctivae and sclerae normal  HENT: ear canals and TM's normal, nose and mouth without ulcers or lesions  NECK: no adenopathy, no asymmetry, masses, or scars and thyroid normal to palpation  RESP: lungs clear to auscultation - no rales, rhonchi or wheezes  CV: regular rate and rhythm, normal S1 S2, no S3 or S4, no murmur, click or rub, no peripheral edema and peripheral pulses strong    Results  for orders placed or performed in visit on 12/21/23   XR Chest 2 Views     Status: None    Narrative    EXAM: XR CHEST 2 VIEWS  LOCATION: [facility] [place]  DATE: 12/21/2023    INDICATION:  Cough  COMPARISON: 10/21/2021      Impression    IMPRESSION: Negative chest.   Results for orders placed or performed in visit on 12/21/23   Influenza A & B Antigen - Clinic Collect     Status: Abnormal    Specimen: Nose; Swab   Result Value Ref Range    Influenza A antigen Positive (A) Negative    Influenza B antigen Negative Negative    Narrative    Test results must be correlated with clinical data. If necessary, results should be confirmed by a molecular assay or viral culture.

## 2024-01-01 PROBLEM — E66.09 CLASS 1 OBESITY DUE TO EXCESS CALORIES WITH SERIOUS COMORBIDITY AND BODY MASS INDEX (BMI) OF 30.0 TO 30.9 IN ADULT: Chronic | Status: ACTIVE | Noted: 2024-01-01

## 2024-01-01 PROBLEM — I10 HYPERTENSION GOAL BP (BLOOD PRESSURE) < 140/90: Chronic | Status: ACTIVE | Noted: 2021-10-21

## 2024-01-01 PROBLEM — E66.811 CLASS 1 OBESITY DUE TO EXCESS CALORIES WITH SERIOUS COMORBIDITY AND BODY MASS INDEX (BMI) OF 30.0 TO 30.9 IN ADULT: Chronic | Status: ACTIVE | Noted: 2024-01-01

## 2024-01-01 ASSESSMENT — SLEEP AND FATIGUE QUESTIONNAIRES
HOW LIKELY ARE YOU TO NOD OFF OR FALL ASLEEP WHILE SITTING AND TALKING TO SOMEONE: MODERATE CHANCE OF DOZING
HOW LIKELY ARE YOU TO NOD OFF OR FALL ASLEEP WHILE SITTING AND READING: HIGH CHANCE OF DOZING
HOW LIKELY ARE YOU TO NOD OFF OR FALL ASLEEP WHILE LYING DOWN TO REST IN THE AFTERNOON WHEN CIRCUMSTANCES PERMIT: HIGH CHANCE OF DOZING
HOW LIKELY ARE YOU TO NOD OFF OR FALL ASLEEP WHILE WATCHING TV: HIGH CHANCE OF DOZING
HOW LIKELY ARE YOU TO NOD OFF OR FALL ASLEEP IN A CAR, WHILE STOPPED FOR A FEW MINUTES IN TRAFFIC: SLIGHT CHANCE OF DOZING
HOW LIKELY ARE YOU TO NOD OFF OR FALL ASLEEP WHILE SITTING QUIETLY AFTER LUNCH WITHOUT ALCOHOL: HIGH CHANCE OF DOZING
HOW LIKELY ARE YOU TO NOD OFF OR FALL ASLEEP WHILE SITTING INACTIVE IN A PUBLIC PLACE: MODERATE CHANCE OF DOZING
HOW LIKELY ARE YOU TO NOD OFF OR FALL ASLEEP WHEN YOU ARE A PASSENGER IN A CAR FOR AN HOUR WITHOUT A BREAK: HIGH CHANCE OF DOZING

## 2024-01-02 ENCOUNTER — DOCUMENTATION ONLY (OUTPATIENT)
Dept: SLEEP MEDICINE | Facility: CLINIC | Age: 69
End: 2024-01-02

## 2024-01-02 ENCOUNTER — VIRTUAL VISIT (OUTPATIENT)
Dept: SLEEP MEDICINE | Facility: CLINIC | Age: 69
End: 2024-01-02
Payer: COMMERCIAL

## 2024-01-02 VITALS — HEIGHT: 68 IN | WEIGHT: 195 LBS | BODY MASS INDEX: 29.55 KG/M2

## 2024-01-02 DIAGNOSIS — E66.09 CLASS 1 OBESITY DUE TO EXCESS CALORIES WITH SERIOUS COMORBIDITY AND BODY MASS INDEX (BMI) OF 30.0 TO 30.9 IN ADULT: Chronic | ICD-10-CM

## 2024-01-02 DIAGNOSIS — G47.33 OSA (OBSTRUCTIVE SLEEP APNEA): Primary | Chronic | ICD-10-CM

## 2024-01-02 DIAGNOSIS — E66.811 CLASS 1 OBESITY DUE TO EXCESS CALORIES WITH SERIOUS COMORBIDITY AND BODY MASS INDEX (BMI) OF 30.0 TO 30.9 IN ADULT: Chronic | ICD-10-CM

## 2024-01-02 PROCEDURE — 99214 OFFICE O/P EST MOD 30 MIN: CPT | Mod: 95 | Performed by: INTERNAL MEDICINE

## 2024-01-02 ASSESSMENT — PAIN SCALES - GENERAL: PAINLEVEL: NO PAIN (0)

## 2024-01-02 NOTE — NURSING NOTE
Is the patient currently in the state of [place]? YES    Visit mode:VIDEO    If the visit is dropped, the patient can be reconnected by: VIDEO VISIT: Text to cell phone:   Telephone Information:   Mobile [phone]       Will anyone else be joining the visit? NO  (If patient encounters technical issues they should call [phone])    How would you like to obtain your AVS? MyChart    Are changes needed to the allergy or medication list? Pt stated no changes to allergies and Pt stated no med changes    Reason for visit: RECHECK  Has patient had flu shot for current/most recent flu season? If so, when? No      Esthela SOUZA

## 2024-01-02 NOTE — PROGRESS NOTES
Virtual Visit Details    Type of service:  Video Visit   Video Start Time: 9:52 AM  Video End Time:10:08 AM    Originating Location (pt. Location): Other truck    Distant Location (provider location):  Off-site  Platform used for Video Visit: Indra

## 2024-01-02 NOTE — PROGRESS NOTES
Sleep Apnea - Follow-up Visit:    Impression/Plan:     Mild Sleep apnea. Tolerating PAP well.  - Narrow pressures to 9-14 cmH20 (thus was recommended at last visit)      Quique Lyons will follow up in about 2 year(s).       I spent 10 minutes with patient including counseling, and 10 minutes with chart review, and documentation         History of Present Illness:  Chief Complaint   Patient presents with    RECHECK       Quique Lyons presents for follow-up of their mild sleep apnea, managed with CPAP.     DME [facility]     Quique Lyons for follow-up of their mild sleep apnea, managed with CPAP.       Initial sleep study done for loud snoring, restless sleep/'jerking', excessive daytime sleepiness (ESS 18), night sweats, crowded oropharynx. Comrobid atrial flutter.        Study Date: 11/15/2020 (195.0 lbs)   - Apnea/hypopnea index was 8.8 events per hour (central apnea/hypopnea index was 0 events per hour). The REM AHI was 22.9 events per hour. The supine AHI was 11.0 events per hour. RDI was 13.9 events per hour.   -  Lowest oxygen saturation was 83.1%. Time spent less than or equal to 88% was 1.2 minutes. Time spent less than or equal to 89% was 4.0 minutes.   - PLM index was 74.2 movements per hour. The PLM Arousal Index was 17.1 per hour.     Elected trial of autoCPAP 5-15 cmH20    Do you use a CPAP Machine at home: Yes  Overall, on a scale of 0-10 how would you rate your CPAP (0 poor, 10 great): 5    Occasional discomfort     What type of mask do you use: Nasal Pillow  Is your mask comfortable: Yes       Is your mask leaking: Yes  If yes, where do you feel it: face  How many night per week does the mask leak (0-7): 5    Do you notice snoring with mask on: No  Do you notice gasping arousals with mask on: No  Are you having significant oral or nasal dryness: No  Is the pressure setting comfortable: Yes       What is your typical bedtime: 9pm  How long does it take you to go to sleep on PAP therapy: 1  min  What time do you typically get out of bed for the day: 5am  How many hours on average per night are you using PAP therapy: 8  How many hours are you sleeping per night: 8  Do you feel well rested in the morning: Yes      [employer]Med   Auto-PAP 5.0 - 15.0 cmH2O 30 day usage data:    80% of days with > 4 hours of use. 6/30 days with no use when he had the flu and was sleeping in a chair cough  Average use 6' 20 minutes per day.   95%ile Leak 40.85 L/min.   CPAP 95% pressure 12.9 cm. Median 9  AHI 1.84 events per hour.     He will take CPAP off due to leak and discomfort       EPWORTH SLEEPINESS SCALE         1/1/2024     9:43 AM    Graham Sleepiness Scale ( CINTHYA Rodriguez  [id number]<br>ESS - [place]/English - Final version - 21 Nov 07 - [facility] Research [place].)   Sitting and reading High chance of dozing   Watching TV High chance of dozing   Sitting, inactive in a public place (e.g. a theatre or a meeting) Moderate chance of dozing   As a passenger in a car for an hour without a break High chance of dozing   Lying down to rest in the afternoon when circumstances permit High chance of dozing   Sitting and talking to someone Moderate chance of dozing   Sitting quietly after a lunch without alcohol High chance of dozing   In a car, while stopped for a few minutes in traffic Slight chance of dozing   Graham Score (MC) 20   Graham Score (Sleep) 20       INSOMNIA SEVERITY INDEX (GENARO)          1/1/2024     9:38 AM   Insomnia Severity Index (GENARO)   Difficulty falling asleep 0   Difficulty staying asleep 0   Problems waking up too early 1   How SATISFIED/DISSATISFIED are you with your CURRENT sleep pattern? 2   How NOTICEABLE to others do you think your sleep problem is in terms of impairing the quality of your life? 3   How WORRIED/DISTRESSED are you about your current sleep problem? 2   To what extent do you consider your sleep problem to INTERFERE with your daily functioning (e.g. daytime fatigue, mood, ability to function at  "work/daily chores, concentration, memory, mood, etc.) CURRENTLY? 2   GENARO Total Score 10       Guidelines for Scoring/Interpretation:  Total score categories:  0-7 = No clinically significant insomnia   8-14 = Subthreshold insomnia   15-21 = Clinical insomnia (moderate severity)  22-28 = Clinical insomnia (severe)  Used via courtesy of www.myhealth.[place].gov with permission from Kenan Saenz PhD., [facility]        Past medical/surgical history, family history, social history, medications and allergies were reviewed.        Problem List:  Patient Active Problem List    Diagnosis Date Noted    Hypertension goal BP (blood pressure) < 140/90 10/21/2021     Priority: Medium    CRICKET (obstructive sleep apnea)- mild (AHI 8) 11/16/2020     Priority: Medium     11/15/2020 [facility] Study (195.0 lbs) - AHI 8.8, RDI 13.9, Supine AHI 11.0, REM AHI 22.9, Low O2 83.1%, Time Spent ?88% 1.2 minutes / Time Spent ?89% 4.0 minutes. PLM index was 74.2 movements per hour. The PLM Arousal Index was 17.1 per hour.      Atrial flutter (H) 10/06/2020     Priority: Medium     Admitted 9/11/20 with atrial flutter with rapid ventricular response. Converted with diltiazam      Tobacco abuse 04/06/2015     Priority: Medium    Class 1 obesity due to excess calories with serious comorbidity and body mass index (BMI) of 30.0 to 30.9 in adult 01/01/2024     Priority: Low    Advanced directives, counseling/discussion 01/13/2014     Priority: Low     Discussed Advance Directive planning with patient; however, patient declined at this time.          Ht 1.727 m (5' 8\")   Wt 88.5 kg (195 lb)   BMI 29.65 kg/m      "

## 2024-01-05 NOTE — NURSING NOTE
Pressured where changed by [facility] on 1/2/2024. Confirmed in [facility]view. 2 year reminder sent.     Janey Salinas   Clinic Assistant  [facility]

## 2024-03-01 DIAGNOSIS — I10 BENIGN ESSENTIAL HYPERTENSION: ICD-10-CM

## 2024-03-01 DIAGNOSIS — E78.5 HYPERLIPIDEMIA LDL GOAL <100: ICD-10-CM

## 2024-03-04 ENCOUNTER — TELEPHONE (OUTPATIENT)
Dept: CARDIOLOGY | Facility: CLINIC | Age: 69
End: 2024-03-04
Payer: COMMERCIAL

## 2024-03-04 DIAGNOSIS — E78.5 HYPERLIPIDEMIA LDL GOAL <100: ICD-10-CM

## 2024-03-04 RX ORDER — ROSUVASTATIN CALCIUM 10 MG/1
10 TABLET, COATED ORAL DAILY
Qty: 90 TABLET | Refills: 0 | OUTPATIENT
Start: 2024-03-04

## 2024-03-04 RX ORDER — ROSUVASTATIN CALCIUM 10 MG/1
10 TABLET, COATED ORAL DAILY
Qty: 90 TABLET | Refills: 0 | Status: SHIPPED | OUTPATIENT
Start: 2024-03-04 | End: 2024-05-28

## 2024-03-07 DIAGNOSIS — I10 BENIGN ESSENTIAL HYPERTENSION: ICD-10-CM

## 2024-03-07 RX ORDER — LOSARTAN POTASSIUM 100 MG/1
100 TABLET ORAL DAILY
Qty: 90 TABLET | Refills: 0 | OUTPATIENT
Start: 2024-03-07

## 2024-03-07 RX ORDER — LOSARTAN POTASSIUM 100 MG/1
100 TABLET ORAL DAILY
Qty: 90 TABLET | Refills: 0 | Status: SHIPPED | OUTPATIENT
Start: 2024-03-07 | End: 2024-05-28

## 2024-03-07 RX ORDER — AMLODIPINE BESYLATE 10 MG/1
10 TABLET ORAL DAILY
Qty: 90 TABLET | Refills: 0 | Status: SHIPPED | OUTPATIENT
Start: 2024-03-07 | End: 2024-05-28

## 2024-03-07 RX ORDER — AMLODIPINE BESYLATE 10 MG/1
10 TABLET ORAL DAILY
Qty: 90 TABLET | Refills: 0 | OUTPATIENT
Start: 2024-03-07

## 2024-04-16 ENCOUNTER — TELEPHONE (OUTPATIENT)
Dept: FAMILY MEDICINE | Facility: CLINIC | Age: 69
End: 2024-04-16
Payer: COMMERCIAL

## 2024-04-16 NOTE — TELEPHONE ENCOUNTER
Patient Quality Outreach    Patient is due for the following:   Hypertension -  BP check  Colon Cancer Screening  Physical Annual Wellness Visit      Topic Date Due    Zoster (Shingles) Vaccine (1 of 2) Never done    Pneumococcal Vaccine (2 of 2 - PCV) 09/23/2021    Diptheria Tetanus Pertussis (DTAP/TDAP/TD) Vaccine (2 - Td or Tdap) 08/13/2023    Flu Vaccine (1) 09/01/2023    COVID-19 Vaccine (4 - 2023-24 season) 09/01/2023       Next Steps:   Schedule a Annual Wellness Visit    Type of outreach:    Sent [employer] message.    Next Steps:  Reach out within 90 days via [employer].    Max number of attempts reached: No. Will try again in 90 days if patient still on fail list.    Questions for provider review:    None           Annabella Benton MA  Chart routed to Care Team.

## 2024-05-21 NOTE — PROGRESS NOTES
CARDIOLOGY VISIT    REASON FOR VISIT: aflutter    SUBJECTIVE:  68-year-old male seen for atrial flutter.  He has hypertension, COPD, PFO, tobacco abuse, sleep apnea.     2020 he had atrial flutter, he underwent subsequent ablation.  He has had less than 1% A. fib burden subsequently.     Zio monitor August 2021 showed sinus rhythm, less than 1% A. fib with longest episode 67 seconds.     Stress echo October 2022 was normal peak blood pressure 220 systolic.    Overall has been doing well.  He denies any palpitations.  Blood pressure averages 130/65 at home.  He continues to work construction and denies any significant chest pain or shortness of breath.  He does complain of some mild tiredness, but is compliant with his CPAP.    MEDICATIONS:  Current Outpatient Medications   Medication Sig Dispense Refill    albuterol (PROAIR HFA/PROVENTIL HFA/VENTOLIN HFA) 108 (90 Base) MCG/ACT inhaler Inhale 2 puffs into the lungs every 4 hours as needed for shortness of breath or wheezing 18 g 0    albuterol (PROAIR HFA/PROVENTIL HFA/VENTOLIN HFA) 108 (90 Base) MCG/ACT inhaler Inhale 2 puffs into the lungs every 6 hours 8 g 1    amLODIPine (NORVASC) 10 MG tablet Take 1 tablet (10 mg) by mouth daily 90 tablet 0    losartan (COZAAR) 100 MG tablet Take 1 tablet (100 mg) by mouth daily 90 tablet 0    rosuvastatin (CRESTOR) 10 MG tablet Take 1 tablet (10 mg) by mouth daily Appointment required for further refills 90 tablet 0     No current facility-administered medications for this visit.       ALLERGIES:  No Known Allergies    REVIEW OF SYSTEMS:  Constitutional:  No weight loss, fever, chills  HEENT:  Eyes:  No visual loss, blurred vision, double vision or yellow sclerae. No hearing loss, sneezing, congestion, runny nose or sore throat.  Skin:  No rash or itching.  Cardiovascular: per HPI  Respiratory: per HPI  GI:  No anorexia, nausea, vomiting or diarrhea. No abdominal pain or blood.  :  No dysurea, hematuria  Neurologic:  No  headache, paralysis, ataxia, numbness or tingling in the extremities. No change in bowel or bladder control.  Musculoskeletal:  No muscle pain  Hematologic:  No bleeding or bruising.  Lymphatics:  No enlarged nodes. No history of splenectomy.  Endocrine:  No reports of sweating, cold or heat intolerance. No polyuria or polydipsia.  Allergies:  No history of asthma, hives, eczema or rhinitis.    PHYSICAL EXAM:  BP (!) 158/71 (BP Location: Right arm, Patient Position: Sitting)   Pulse 65   Resp 14   Wt 93 kg (205 lb)   SpO2 99%   BMI 31.17 kg/m    Constitutional: awake, alert, no distress  Eyes: PERRL, sclera nonicteric  ENT: trachea midline  Respiratory: Lungs clear  Cardiovascular: Regular rate and rhythm, no murmurs  GI: nondistended, nontender, bowel sounds present  Lymph/Hematologic: no lymphadenopathy  Skin: dry, no rash  Musculoskeletal: good muscle tone, strength 5/5 in upper and lower extremities  Neurologic: no focal deficits  Neuropsychiatric: appropriate affact    DATA:  Lab:   Recent Labs   Lab Test 05/26/22  0911 03/22/22  1014   CHOL 134 236*   HDL 49 48   LDL 73 174*   TRIG 58 71     ASSESSMENT:  68-year-old male seen for atrial arrhythmia.  He seems to be doing well with no concerning cardiac symptoms.  He has no symptoms of recurrent atrial fibrillation or flutter.  Blood pressure is well-controlled at home.  He is due for lab work.    RECOMMENDATIONS:  1.  History of atrial flutter, status post ablation  -No symptomatic recurrence, no need for anticoagulation    2.  Hypertension  -Controlled on current therapy    Check labs today including BMP, CBC, A1c, lipids.    Follow-up in 1 year with MILLER.    Alejandro Calvo MD  Cardiology - [facility]  Pager:  [phone]  Text Page  May 28, 2024

## 2024-05-28 ENCOUNTER — OFFICE VISIT (OUTPATIENT)
Dept: CARDIOLOGY | Facility: CLINIC | Age: 69
End: 2024-05-28
Attending: NURSE PRACTITIONER
Payer: COMMERCIAL

## 2024-05-28 VITALS
WEIGHT: 205 LBS | DIASTOLIC BLOOD PRESSURE: 65 MMHG | SYSTOLIC BLOOD PRESSURE: 130 MMHG | BODY MASS INDEX: 31.17 KG/M2 | HEART RATE: 65 BPM | RESPIRATION RATE: 14 BRPM | OXYGEN SATURATION: 99 %

## 2024-05-28 DIAGNOSIS — E78.5 HYPERLIPIDEMIA LDL GOAL <100: ICD-10-CM

## 2024-05-28 DIAGNOSIS — I10 BENIGN ESSENTIAL HYPERTENSION: ICD-10-CM

## 2024-05-28 DIAGNOSIS — R73.09 BLOOD GLUCOSE ABNORMAL: Primary | ICD-10-CM

## 2024-05-28 DIAGNOSIS — I48.0 PAROXYSMAL ATRIAL FIBRILLATION (H): ICD-10-CM

## 2024-05-28 DIAGNOSIS — Z86.79 H/O ATRIAL FLUTTER: ICD-10-CM

## 2024-05-28 PROCEDURE — 99214 OFFICE O/P EST MOD 30 MIN: CPT | Performed by: INTERNAL MEDICINE

## 2024-05-28 RX ORDER — ROSUVASTATIN CALCIUM 10 MG/1
10 TABLET, COATED ORAL DAILY
Qty: 90 TABLET | Refills: 0 | Status: SHIPPED | OUTPATIENT
Start: 2024-05-28 | End: 2024-06-17

## 2024-05-28 RX ORDER — AMLODIPINE BESYLATE 10 MG/1
10 TABLET ORAL DAILY
Qty: 90 TABLET | Refills: 0 | Status: SHIPPED | OUTPATIENT
Start: 2024-05-28 | End: 2024-06-17

## 2024-05-28 RX ORDER — LOSARTAN POTASSIUM 100 MG/1
100 TABLET ORAL DAILY
Qty: 90 TABLET | Refills: 0 | Status: SHIPPED | OUTPATIENT
Start: 2024-05-28 | End: 2024-06-17

## 2024-05-28 NOTE — LETTER
5/28/2024    Steven Adam PA-C  [address] [facility] [address]    RE: Quique Lyons       Dear Colleague,     I had the pleasure of seeing Quique Lyons in the [facility].  CARDIOLOGY VISIT    REASON FOR VISIT: aflutter    SUBJECTIVE:  68-year-old male seen for atrial flutter.  He has hypertension, COPD, PFO, tobacco abuse, sleep apnea.     2020 he had atrial flutter, he underwent subsequent ablation.  He has had less than 1% A. fib burden subsequently.     Zio monitor August 2021 showed sinus rhythm, less than 1% A. fib with longest episode 67 seconds.     Stress echo October 2022 was normal peak blood pressure 220 systolic.    Overall has been doing well.  He denies any palpitations.  Blood pressure averages 130/65 at home.  He continues to work construction and denies any significant chest pain or shortness of breath.  He does complain of some mild tiredness, but is compliant with his CPAP.    MEDICATIONS:  Current Outpatient Medications   Medication Sig Dispense Refill    albuterol (PROAIR HFA/PROVENTIL HFA/VENTOLIN HFA) 108 (90 Base) MCG/ACT inhaler Inhale 2 puffs into the lungs every 4 hours as needed for shortness of breath or wheezing 18 g 0    albuterol (PROAIR HFA/PROVENTIL HFA/VENTOLIN HFA) 108 (90 Base) MCG/ACT inhaler Inhale 2 puffs into the lungs every 6 hours 8 g 1    amLODIPine (NORVASC) 10 MG tablet Take 1 tablet (10 mg) by mouth daily 90 tablet 0    losartan (COZAAR) 100 MG tablet Take 1 tablet (100 mg) by mouth daily 90 tablet 0    rosuvastatin (CRESTOR) 10 MG tablet Take 1 tablet (10 mg) by mouth daily Appointment required for further refills 90 tablet 0     No current facility-administered medications for this visit.       ALLERGIES:  No Known Allergies    REVIEW OF SYSTEMS:  Constitutional:  No weight loss, fever, chills  HEENT:  Eyes:  No visual loss, blurred vision, double vision or yellow sclerae. No hearing loss, sneezing, congestion, runny nose or sore  throat.  Skin:  No rash or itching.  Cardiovascular: per HPI  Respiratory: per HPI  GI:  No anorexia, nausea, vomiting or diarrhea. No abdominal pain or blood.  :  No dysurea, hematuria  Neurologic:  No headache, paralysis, ataxia, numbness or tingling in the extremities. No change in bowel or bladder control.  Musculoskeletal:  No muscle pain  Hematologic:  No bleeding or bruising.  Lymphatics:  No enlarged nodes. No history of splenectomy.  Endocrine:  No reports of sweating, cold or heat intolerance. No polyuria or polydipsia.  Allergies:  No history of asthma, hives, eczema or rhinitis.    PHYSICAL EXAM:  BP (!) 158/71 (BP Location: Right arm, Patient Position: Sitting)   Pulse 65   Resp 14   Wt 93 kg (205 lb)   SpO2 99%   BMI 31.17 kg/m    Constitutional: awake, alert, no distress  Eyes: PERRL, sclera nonicteric  ENT: trachea midline  Respiratory: Lungs clear  Cardiovascular: Regular rate and rhythm, no murmurs  GI: nondistended, nontender, bowel sounds present  Lymph/Hematologic: no lymphadenopathy  Skin: dry, no rash  Musculoskeletal: good muscle tone, strength 5/5 in upper and lower extremities  Neurologic: no focal deficits  Neuropsychiatric: appropriate affact    DATA:  Lab:   Recent Labs   Lab Test 05/26/22  0911 03/22/22  1014   CHOL 134 236*   HDL 49 48   LDL 73 174*   TRIG 58 71     ASSESSMENT:  68-year-old male seen for atrial arrhythmia.  He seems to be doing well with no concerning cardiac symptoms.  He has no symptoms of recurrent atrial fibrillation or flutter.  Blood pressure is well-controlled at home.  He is due for lab work.    RECOMMENDATIONS:  1.  History of atrial flutter, status post ablation  -No symptomatic recurrence, no need for anticoagulation    2.  Hypertension  -Controlled on current therapy    Check labs today including BMP, CBC, A1c, lipids.    Follow-up in 1 year with MILLER.    Alejandro Calvo MD  Cardiology - [facility]  Pager:  [phone]  Text Page  May 28,  2024        Thank you for allowing me to participate in the care of your patient.      Sincerely,     Alejandro Calvo MD     [facility]  cc:   CRISTO Lopez CNP  [address]  [place] [address]

## 2024-06-17 ENCOUNTER — MYC REFILL (OUTPATIENT)
Dept: CARDIOLOGY | Facility: CLINIC | Age: 69
End: 2024-06-17
Payer: COMMERCIAL

## 2024-06-17 DIAGNOSIS — I10 BENIGN ESSENTIAL HYPERTENSION: ICD-10-CM

## 2024-06-17 DIAGNOSIS — E78.5 HYPERLIPIDEMIA LDL GOAL <100: ICD-10-CM

## 2024-06-17 RX ORDER — LOSARTAN POTASSIUM 100 MG/1
100 TABLET ORAL DAILY
Qty: 90 TABLET | Refills: 3 | Status: SHIPPED | OUTPATIENT
Start: 2024-06-17 | End: 2024-09-20

## 2024-06-17 RX ORDER — ROSUVASTATIN CALCIUM 10 MG/1
10 TABLET, COATED ORAL DAILY
Qty: 90 TABLET | Refills: 3 | Status: SHIPPED | OUTPATIENT
Start: 2024-06-17 | End: 2024-09-20

## 2024-06-17 RX ORDER — AMLODIPINE BESYLATE 10 MG/1
10 TABLET ORAL DAILY
Qty: 90 TABLET | Refills: 3 | Status: SHIPPED | OUTPATIENT
Start: 2024-06-17 | End: 2024-09-20

## 2024-06-17 NOTE — TELEPHONE ENCOUNTER
[facility] Cardiology Refill Guideline reviewed.  Medication meets criteria for refill. Lexis Singletary RN Cardiology June 17, 2024, 11:17 AM

## 2024-09-08 ENCOUNTER — HEALTH MAINTENANCE LETTER (OUTPATIENT)
Age: 69
End: 2024-09-08

## 2024-09-20 ENCOUNTER — MYC REFILL (OUTPATIENT)
Dept: CARDIOLOGY | Facility: CLINIC | Age: 69
End: 2024-09-20
Payer: COMMERCIAL

## 2024-09-20 DIAGNOSIS — I10 BENIGN ESSENTIAL HYPERTENSION: ICD-10-CM

## 2024-09-20 DIAGNOSIS — E78.5 HYPERLIPIDEMIA LDL GOAL <100: ICD-10-CM

## 2024-09-20 RX ORDER — LOSARTAN POTASSIUM 100 MG/1
100 TABLET ORAL DAILY
Qty: 90 TABLET | Refills: 3 | Status: SHIPPED | OUTPATIENT
Start: 2024-09-20

## 2024-09-20 RX ORDER — AMLODIPINE BESYLATE 10 MG/1
10 TABLET ORAL DAILY
Qty: 90 TABLET | Refills: 3 | Status: SHIPPED | OUTPATIENT
Start: 2024-09-20

## 2024-09-20 RX ORDER — ROSUVASTATIN CALCIUM 10 MG/1
10 TABLET, COATED ORAL DAILY
Qty: 90 TABLET | Refills: 3 | Status: SHIPPED | OUTPATIENT
Start: 2024-09-20

## 2024-09-20 NOTE — TELEPHONE ENCOUNTER
[facility] Cardiology Refill Guideline reviewed.  Medication meets criteria for refill. Lexis Singletary RN Cardiology September 20, 2024, 3:34 PM

## 2025-01-14 ENCOUNTER — DOCUMENTATION ONLY (OUTPATIENT)
Dept: SLEEP MEDICINE | Facility: CLINIC | Age: 70
End: 2025-01-14
Payer: COMMERCIAL

## 2025-01-14 ENCOUNTER — TELEPHONE (OUTPATIENT)
Dept: FAMILY MEDICINE | Facility: CLINIC | Age: 70
End: 2025-01-14
Payer: COMMERCIAL

## 2025-01-14 DIAGNOSIS — G47.33 OBSTRUCTIVE SLEEP APNEA (ADULT) (PEDIATRIC): Primary | ICD-10-CM

## 2025-01-14 NOTE — TELEPHONE ENCOUNTER
Order/Referral Request    Who is requesting: patient    Orders being requested: all of his cpap equipment, hose, head mask, water container, pillows    Reason service is needed/diagnosis: equipment is worn out     When are orders needed by: asap as he is leaving for vacation early Thursday morning    Has this been discussed with Provider: Yes [facility] medical equipment sent info to provider also    Does patient have a preference on a Group/Provider/Facility? [facility]    Does patient have an appointment scheduled?: No    Where to send orders: Place orders within Epic    Could we send this information to you in [facility] or would you prefer to receive a phone call?:   Patient would prefer a phone call   Okay to leave a detailed message?: Yes at Home number on file [phone] (home)

## 2025-03-26 ENCOUNTER — MYC REFILL (OUTPATIENT)
Dept: CARDIOLOGY | Facility: CLINIC | Age: 70
End: 2025-03-26
Payer: COMMERCIAL

## 2025-03-26 DIAGNOSIS — I10 BENIGN ESSENTIAL HYPERTENSION: ICD-10-CM

## 2025-03-26 DIAGNOSIS — E78.5 HYPERLIPIDEMIA LDL GOAL <100: ICD-10-CM

## 2025-03-26 RX ORDER — ROSUVASTATIN CALCIUM 10 MG/1
10 TABLET, COATED ORAL DAILY
Qty: 90 TABLET | Refills: 0 | Status: SHIPPED | OUTPATIENT
Start: 2025-03-26

## 2025-03-26 RX ORDER — LOSARTAN POTASSIUM 100 MG/1
100 TABLET ORAL DAILY
Qty: 90 TABLET | Refills: 0 | Status: SHIPPED | OUTPATIENT
Start: 2025-03-26

## 2025-03-26 RX ORDER — AMLODIPINE BESYLATE 10 MG/1
10 TABLET ORAL DAILY
Qty: 90 TABLET | Refills: 0 | Status: SHIPPED | OUTPATIENT
Start: 2025-03-26

## 2025-03-26 NOTE — TELEPHONE ENCOUNTER
[facility] Cardiology Refill Guideline reviewed.  Medication meets criteria for refill. Lexis Singletary RN Cardiology March 26, 2025, 3:22 PM

## 2025-04-03 ENCOUNTER — LAB (OUTPATIENT)
Dept: LAB | Facility: CLINIC | Age: 70
End: 2025-04-03
Payer: COMMERCIAL

## 2025-04-03 DIAGNOSIS — Z86.79 H/O ATRIAL FLUTTER: ICD-10-CM

## 2025-04-03 DIAGNOSIS — I10 BENIGN ESSENTIAL HYPERTENSION: ICD-10-CM

## 2025-04-03 DIAGNOSIS — E78.5 HYPERLIPIDEMIA LDL GOAL <100: ICD-10-CM

## 2025-04-03 DIAGNOSIS — R73.09 BLOOD GLUCOSE ABNORMAL: ICD-10-CM

## 2025-04-03 LAB
ANION GAP SERPL CALCULATED.3IONS-SCNC: 11 MMOL/L (ref 7–15)
BUN SERPL-MCNC: 20.1 MG/DL (ref 8–23)
CALCIUM SERPL-MCNC: 9.4 MG/DL (ref 8.8–10.4)
CHLORIDE SERPL-SCNC: 103 MMOL/L (ref 98–107)
CHOLEST SERPL-MCNC: 144 MG/DL
CREAT SERPL-MCNC: 1.11 MG/DL (ref 0.67–1.17)
EGFRCR SERPLBLD CKD-EPI 2021: 73 ML/MIN/1.73M2
ERYTHROCYTE [DISTWIDTH] IN BLOOD BY AUTOMATED COUNT: 12.3 % (ref 10–15)
EST. AVERAGE GLUCOSE BLD GHB EST-MCNC: 120 MG/DL
FASTING STATUS PATIENT QL REPORTED: YES
FASTING STATUS PATIENT QL REPORTED: YES
GLUCOSE SERPL-MCNC: 123 MG/DL (ref 70–99)
HBA1C MFR BLD: 5.8 % (ref 0–5.6)
HCO3 SERPL-SCNC: 26 MMOL/L (ref 22–29)
HCT VFR BLD AUTO: 49.8 % (ref 40–53)
HDLC SERPL-MCNC: 46 MG/DL
HGB BLD-MCNC: 16.7 G/DL (ref 13.3–17.7)
LDLC SERPL CALC-MCNC: 88 MG/DL
MCH RBC QN AUTO: 31.4 PG (ref 26.5–33)
MCHC RBC AUTO-ENTMCNC: 33.5 G/DL (ref 31.5–36.5)
MCV RBC AUTO: 94 FL (ref 78–100)
NONHDLC SERPL-MCNC: 98 MG/DL
PLATELET # BLD AUTO: 246 10E3/UL (ref 150–450)
POTASSIUM SERPL-SCNC: 4.6 MMOL/L (ref 3.4–5.3)
RBC # BLD AUTO: 5.32 10E6/UL (ref 4.4–5.9)
SODIUM SERPL-SCNC: 140 MMOL/L (ref 135–145)
TRIGL SERPL-MCNC: 49 MG/DL
WBC # BLD AUTO: 8.2 10E3/UL (ref 4–11)

## 2025-05-22 ENCOUNTER — OFFICE VISIT (OUTPATIENT)
Dept: CARDIOLOGY | Facility: CLINIC | Age: 70
End: 2025-05-22
Payer: COMMERCIAL

## 2025-05-22 VITALS
OXYGEN SATURATION: 95 % | RESPIRATION RATE: 16 BRPM | WEIGHT: 211.9 LBS | DIASTOLIC BLOOD PRESSURE: 67 MMHG | HEART RATE: 116 BPM | BODY MASS INDEX: 32.22 KG/M2 | SYSTOLIC BLOOD PRESSURE: 137 MMHG

## 2025-05-22 DIAGNOSIS — I10 BENIGN ESSENTIAL HYPERTENSION: ICD-10-CM

## 2025-05-22 DIAGNOSIS — E78.5 HYPERLIPIDEMIA LDL GOAL <100: Primary | ICD-10-CM

## 2025-05-22 DIAGNOSIS — I48.0 PAROXYSMAL ATRIAL FIBRILLATION (H): ICD-10-CM

## 2025-05-22 RX ORDER — AMLODIPINE BESYLATE 10 MG/1
10 TABLET ORAL DAILY
Qty: 90 TABLET | Refills: 3 | Status: CANCELLED | OUTPATIENT
Start: 2025-05-22

## 2025-05-22 RX ORDER — DILTIAZEM HYDROCHLORIDE 240 MG/1
240 CAPSULE, EXTENDED RELEASE ORAL DAILY
Qty: 30 CAPSULE | Refills: 11 | Status: SHIPPED | OUTPATIENT
Start: 2025-05-22

## 2025-05-22 RX ORDER — ROSUVASTATIN CALCIUM 10 MG/1
10 TABLET, COATED ORAL DAILY
Qty: 90 TABLET | Refills: 3 | Status: SHIPPED | OUTPATIENT
Start: 2025-05-22

## 2025-05-22 RX ORDER — LOSARTAN POTASSIUM 100 MG/1
100 TABLET ORAL DAILY
Qty: 90 TABLET | Refills: 3 | Status: SHIPPED | OUTPATIENT
Start: 2025-05-22

## 2025-05-22 RX ORDER — METOPROLOL SUCCINATE 25 MG/1
25 TABLET, EXTENDED RELEASE ORAL DAILY
Qty: 2 TABLET | Refills: 0 | Status: SHIPPED | OUTPATIENT
Start: 2025-05-22 | End: 2025-05-23

## 2025-05-22 NOTE — LETTER
5/22/2025    Steven Adam PA-C  [address] [facility] [address]    RE: Quique Lyons       Dear Colleague,     I had the pleasure of seeing Quique Lyons in the [facility].    [facility] [place]  [address]  [facility] [address]  Phone: [phone]  Fax: [phone]    Patient:  Quique Lyons, Date of birth 1955  Date of Visit:  05/22/2025  Referring Provider Alejandro Turcios*      Subjective  Quique is a 69 year old male who presents for Follow Up, Atrial Fib, Hypertension, and Fatigue    Atrial flutter/ Atrial fibrillation  Hospitalized with typical atrial flutter RVR 9/11/2020 with conversion to sinus rhythm after IV diltiazem  Previously symptomatic with dizziness and presyncope  S/p flutter ablation 10/2020  Zio patch 2021 <1% afib   Has not been on anticoagulation due to low A-fib burden  Hypertension  PFO   Possible PFO seen on echo 2020  Past medical history significant for current tobacco abuse, COPD, sleep apnea using CPAP.  He is a monsalve and is .    History of Present Illness    Quique Lyons, 69 years    Paroxysmal Atrial Fibrillation  - History of paroxysmal atrial fibrillation  - Previous flutter ablation  - Reports heart racing currently  - A-fib/flutter possibly triggered by 2 drinks of hard liquor last night (patient is unsure of the date of onset)    Fatigue  - Feeling tired all the time for over the last year.   - Consistent use of CPAP machine  - Previous feeling of tiredness last year, unsure if it's worse or the same now  - Possible causes mentioned include atrial flutter    Hypertension and Hyperlipidemia  - History of hypertension and hyperlipidemia  - Most recent lipid profile in April 2025 showed controlled LDL at 88     Remains physically active with walking, biking, cutting down trees      Objective    Vital signs:  /67   Pulse 116   Resp 16   Wt 96.1 kg (211 lb 14.4 oz)   SpO2 95%    BMI 32.22 kg/m    Blood pressure 137/67, pulse 116, resp. rate 16, weight 96.1 kg (211 lb 14.4 oz), SpO2 95%.  Physical Exam    - CARDIOVASCULAR: Intermittent irregular heart rhythm, possibly early beats or atrial fibrillation. Normotensive.  - LUNGS: Clear breath sounds on auscultation.         Results    - LDL cholesterol: 88 mg/dL  - Hemoglobin: 1 g/dL  - A1c: 5.8%   EKG today, personally viewed by me, shows A-fib/flutter with RVR      Results reviewed today.         Assessment & Plan    Assessment  - atrial fibrillation or atrial flutter possibly contributing to fatigue.  - Controlled hypertension    Plan  -Start Eliquis for thrombotic prophylaxis  - Discontinue amlodipine  - Instead start diltiazem 240 mg daily for rate control and hypertension  -Plan for cardioversion after being on anticoagulation for 3 weeks, with EKG in [place] the day prior to confirm that he was still in A-fib/flutter.   - Echocardiogram given A-fib with tachycardia  - Monitor blood pressure at home if it starts to rise. If elevated readings are observed, continue monitoring for one to two weeks to evaluate overall trends.  - If persistent tiredness is affecting daily activities, consult with sleep medicine to ensure CPAP machine settings are correct and functioning optimally.  - If fatigue continues despite normal EKG results and CPAP settings, consult primary care to evaluate vitamin levels and thyroid function for potential underlying causes.            The patient will start anticoagulation today.  He is aware that he should not miss any doses of anticoagulation for 3 weeks leading up to cardioversion.  He is also aware that he should maintain anticoagulation without missing any doses for 1 month post cardioversion.    I discussed risks, benefits, and indications of proceeding with DC cardioversion.  This includes but is not limited to irregular rhythms such as kareem or tachy arrhythmias, stroke, and surface burns.  I also discussed  discomfort at the IV site and risks of conscious sedation including aspiration pneumonia.  They voice understanding and are willing to proceed.  A formal consent form will be signed by the procedural physician.         Use of an AI documentation tool was used in the creation of  this note.     I spent a total of 53 minutes on the day of the visit.   Time spent by me today doing chart review, history and exam, documentation and further activities per the note        Thank you for allowing me to participate in the care of your patient.      Sincerely,     CRISTO Boudreaux [facility]  cc:   Alejandro Calvo MD  No address on file

## 2025-05-22 NOTE — PROGRESS NOTES
[facility] [place]  [address] [facility] [address]  Phone: [phone]  Fax: [phone]    Patient:  Quique Lyons, Date of birth 1955  Date of Visit:  05/22/2025  Referring Provider Alejandro Turcios*      Subjective   Quique is a 69 year old male who presents for Follow Up, Atrial Fib, Hypertension, and Fatigue    Atrial flutter/ Atrial fibrillation  Hospitalized with typical atrial flutter RVR 9/11/2020 with conversion to sinus rhythm after IV diltiazem  Previously symptomatic with dizziness and presyncope  S/p flutter ablation 10/2020  Zio patch 2021 <1% afib   Has not been on anticoagulation due to low A-fib burden  Hypertension  PFO   Possible PFO seen on echo 2020  Past medical history significant for current tobacco abuse, COPD, sleep apnea using CPAP.  He is a monsalve and is .    History of Present Illness    Quique Lyons, 69 years    Paroxysmal Atrial Fibrillation  - History of paroxysmal atrial fibrillation  - Previous flutter ablation  - Reports heart racing currently  - A-fib/flutter possibly triggered by 2 drinks of hard liquor last night (patient is unsure of the date of onset)    Fatigue  - Feeling tired all the time for over the last year.   - Consistent use of CPAP machine  - Previous feeling of tiredness last year, unsure if it's worse or the same now  - Possible causes mentioned include atrial flutter    Hypertension and Hyperlipidemia  - History of hypertension and hyperlipidemia  - Most recent lipid profile in April 2025 showed controlled LDL at 88     Remains physically active with walking, biking, cutting down trees      Objective     Vital signs:  /67   Pulse 116   Resp 16   Wt 96.1 kg (211 lb 14.4 oz)   SpO2 95%   BMI 32.22 kg/m    Blood pressure 137/67, pulse 116, resp. rate 16, weight 96.1 kg (211 lb 14.4 oz), SpO2 95%.  Physical Exam    - CARDIOVASCULAR: Intermittent irregular heart rhythm, possibly early beats or  atrial fibrillation. Normotensive.  - LUNGS: Clear breath sounds on auscultation.         Results    - LDL cholesterol: 88 mg/dL  - Hemoglobin: 1 g/dL  - A1c: 5.8%   EKG today, personally viewed by me, shows A-fib/flutter with RVR      Results reviewed today.          Assessment & Plan     Assessment  - atrial fibrillation or atrial flutter possibly contributing to fatigue.  - Controlled hypertension    Plan  -Start Eliquis for thrombotic prophylaxis  - Discontinue amlodipine  - Instead start diltiazem 240 mg daily for rate control and hypertension  -Plan for cardioversion after being on anticoagulation for 3 weeks, with EKG in [place] the day prior to confirm that he was still in A-fib/flutter.   - Echocardiogram given A-fib with tachycardia  - Monitor blood pressure at home if it starts to rise. If elevated readings are observed, continue monitoring for one to two weeks to evaluate overall trends.  - If persistent tiredness is affecting daily activities, consult with sleep medicine to ensure CPAP machine settings are correct and functioning optimally.  - If fatigue continues despite normal EKG results and CPAP settings, consult primary care to evaluate vitamin levels and thyroid function for potential underlying causes.            The patient will start anticoagulation today.  He is aware that he should not miss any doses of anticoagulation for 3 weeks leading up to cardioversion.  He is also aware that he should maintain anticoagulation without missing any doses for 1 month post cardioversion.    I discussed risks, benefits, and indications of proceeding with DC cardioversion.  This includes but is not limited to irregular rhythms such as kareem or tachy arrhythmias, stroke, and surface burns.  I also discussed discomfort at the IV site and risks of conscious sedation including aspiration pneumonia.  They voice understanding and are willing to proceed.  A formal consent form will be signed by the procedural  physician.         Use of an AI documentation tool was used in the creation of  this note.     I spent a total of 53 minutes on the day of the visit.   Time spent by me today doing chart review, history and exam, documentation and further activities per the note

## 2025-05-22 NOTE — PATIENT INSTRUCTIONS
"Medication Changes:  Take 1 dose of metoprolol XL 25 mg today after our visit  Start eliquis 5 mg twice a day   Stop amlodipine   Start diltiazem 240 mg daily   CALL IF YOU MISS ANY DOSES OF BLOOD THINNER    Recommendations:  Call if blood pressure is less than 90 on top or less than 100 with lightheadedness.     Check blood pressure at least 1 hour after medications. Call the clinic if your blood pressure is consistently greater than 130/80.    Call if heart rate is staying greater than 100   Call if bleeding concerns     Follow-up:  Cardioversion at [facility] with EKG the day prior, in [place] to make sure you are still in afib/flutter  Echo   Cardiology follow up at [place]: 1 month .   Call 6 months prior to schedule.     Cardiology Scheduling~[phone]  Cardiology Clinic RN~[phone] (Lexis RN, Amanda RN; Olga RN)      Cardioversion    1.    Please call clinic with any new COVID like symptoms prior to procedure.    2.    Take your temperature the morning of the procedure. If it is >100 call the Care Suites at [phone]    3.    Cardioversion to be done at [facility] on 6/13/25 . Please arrive at 6:30am  . If you need to contact [facility] for any reason, please call [phone] option #2.    Please call the cardiology nurse line at [phone] for any questions or concerns  Lexis ORTIZ; Amanda RN; Olga RN      ELECTIVE CARDIOVERSION  [facility]  Your procedure will be done at [facility] located at [address] [place] [address]. Please park in the\"Skyway Ramp\", walk skyway over to hospital, go down one floor and register at the \"[employer] Desk\"   Or \" Parking\" is located by "[employer]" Desk entrance.    Please follow the instructions below:  1. In preparation for your cardioversion you will need to be anticoagulated.    If you are taking a \"Novel Anticoagulant\" Eliquis, please continue to take this medication daily as " directed. You will continue to take this medication after your procedure.    2. The morning of your cardioversion we require that you do the following:  No solid foods 8 hours prior to arrival but may have clear liquids up to 2 hours prior to arrival.    Take your medications immediately upon arising with a small sip of water.    3. You will be unable to drive or make important legal decisions for 24 hours after your procedure. You will need a  home and a responsible adult to stay with you for 24 hours post procedure. Your appointment may be cancelled if you do not have a  or responsible adult to stay with you.    4. If you have any questions please contact your cardiology RN at [phone], [facility] clinic or [facility] at [phone], Option #2.

## 2025-05-23 ENCOUNTER — RESULTS FOLLOW-UP (OUTPATIENT)
Dept: CARDIOLOGY | Facility: CLINIC | Age: 70
End: 2025-05-23

## 2025-05-23 NOTE — RESULT ENCOUNTER NOTE
EF 55-60%; no WMAs; trace to mild TR; mod aortic sclerosis but no AS; rapid AFib--pt on Eliquis. Scheduled for DCCV 6/13/25. Pt notified of results via [employer].

## 2025-06-12 ENCOUNTER — HOSPITAL ENCOUNTER (OUTPATIENT)
Dept: CARDIOLOGY | Facility: CLINIC | Age: 70
End: 2025-06-12
Attending: NURSE PRACTITIONER
Payer: COMMERCIAL

## 2025-06-12 DIAGNOSIS — I48.0 PAROXYSMAL ATRIAL FIBRILLATION (H): ICD-10-CM

## 2025-06-12 PROCEDURE — 93005 ELECTROCARDIOGRAM TRACING: CPT

## 2025-06-13 ENCOUNTER — HOSPITAL ENCOUNTER (OUTPATIENT)
Dept: MEDSURG UNIT | Facility: CLINIC | Age: 70
Discharge: HOME | End: 2025-06-13
Attending: NURSE PRACTITIONER | Admitting: INTERNAL MEDICINE
Payer: COMMERCIAL

## 2025-06-13 ENCOUNTER — HOSPITAL ENCOUNTER (OUTPATIENT)
Facility: CLINIC | Age: 70
Discharge: HOME | End: 2025-06-13
Admitting: INTERNAL MEDICINE
Payer: COMMERCIAL

## 2025-06-13 VITALS
OXYGEN SATURATION: 96 % | BODY MASS INDEX: 30.31 KG/M2 | SYSTOLIC BLOOD PRESSURE: 122 MMHG | HEART RATE: 64 BPM | WEIGHT: 200 LBS | RESPIRATION RATE: 13 BRPM | TEMPERATURE: 97.5 F | DIASTOLIC BLOOD PRESSURE: 69 MMHG | HEIGHT: 68 IN

## 2025-06-13 DIAGNOSIS — I48.0 PAROXYSMAL ATRIAL FIBRILLATION (H): ICD-10-CM

## 2025-06-13 LAB
ATRIAL RATE - MUSE: 61 BPM
ATRIAL RATE - MUSE: 84 BPM
DIASTOLIC BLOOD PRESSURE - MUSE: NORMAL MMHG
DIASTOLIC BLOOD PRESSURE - MUSE: NORMAL MMHG
INTERPRETATION ECG - MUSE: NORMAL
INTERPRETATION ECG - MUSE: NORMAL
MAGNESIUM SERPL-MCNC: 2.2 MG/DL (ref 1.7–2.3)
P AXIS - MUSE: 58 DEGREES
P AXIS - MUSE: NORMAL DEGREES
POTASSIUM SERPL-SCNC: 4.9 MMOL/L (ref 3.4–5.3)
PR INTERVAL - MUSE: 200 MS
PR INTERVAL - MUSE: NORMAL MS
QRS DURATION - MUSE: 104 MS
QRS DURATION - MUSE: 106 MS
QT - MUSE: 364 MS
QT - MUSE: 392 MS
QTC - MUSE: 394 MS
QTC - MUSE: 430 MS
R AXIS - MUSE: 84 DEGREES
R AXIS - MUSE: 85 DEGREES
SYSTOLIC BLOOD PRESSURE - MUSE: NORMAL MMHG
SYSTOLIC BLOOD PRESSURE - MUSE: NORMAL MMHG
T AXIS - MUSE: 70 DEGREES
T AXIS - MUSE: 74 DEGREES
VENTRICULAR RATE- MUSE: 61 BPM
VENTRICULAR RATE- MUSE: 84 BPM

## 2025-06-13 PROCEDURE — 370N000017 HC ANESTHESIA TECHNICAL FEE, PER MIN

## 2025-06-13 PROCEDURE — 92960 CARDIOVERSION ELECTRIC EXT: CPT | Performed by: INTERNAL MEDICINE

## 2025-06-13 PROCEDURE — 999N000184 HC STATISTIC TELEMETRY

## 2025-06-13 PROCEDURE — 999N000010 HC STATISTIC ANES STAT CODE-CRNA PER MINUTE

## 2025-06-13 PROCEDURE — 36415 COLL VENOUS BLD VENIPUNCTURE: CPT | Performed by: INTERNAL MEDICINE

## 2025-06-13 PROCEDURE — 83735 ASSAY OF MAGNESIUM: CPT | Performed by: INTERNAL MEDICINE

## 2025-06-13 PROCEDURE — 258N000003 HC RX IP 258 OP 636: Performed by: INTERNAL MEDICINE

## 2025-06-13 PROCEDURE — 93010 ELECTROCARDIOGRAM REPORT: CPT | Mod: XP | Performed by: INTERNAL MEDICINE

## 2025-06-13 PROCEDURE — 92960 CARDIOVERSION ELECTRIC EXT: CPT

## 2025-06-13 PROCEDURE — 84132 ASSAY OF SERUM POTASSIUM: CPT | Performed by: INTERNAL MEDICINE

## 2025-06-13 PROCEDURE — 93005 ELECTROCARDIOGRAM TRACING: CPT

## 2025-06-13 RX ORDER — SODIUM CHLORIDE 9 MG/ML
INJECTION, SOLUTION INTRAVENOUS CONTINUOUS
Status: DISCONTINUED | OUTPATIENT
Start: 2025-06-13 | End: 2025-06-13 | Stop reason: HOSPADM

## 2025-06-13 RX ORDER — POTASSIUM CHLORIDE 1500 MG/1
20 TABLET, EXTENDED RELEASE ORAL
Status: DISCONTINUED | OUTPATIENT
Start: 2025-06-13 | End: 2025-06-13 | Stop reason: HOSPADM

## 2025-06-13 RX ORDER — MAGNESIUM SULFATE HEPTAHYDRATE 40 MG/ML
2 INJECTION, SOLUTION INTRAVENOUS
Status: DISCONTINUED | OUTPATIENT
Start: 2025-06-13 | End: 2025-06-13 | Stop reason: HOSPADM

## 2025-06-13 RX ORDER — LIDOCAINE 40 MG/G
CREAM TOPICAL
Status: DISCONTINUED | OUTPATIENT
Start: 2025-06-13 | End: 2025-06-13 | Stop reason: HOSPADM

## 2025-06-13 RX ADMIN — SODIUM CHLORIDE: 0.9 INJECTION, SOLUTION INTRAVENOUS at 08:18

## 2025-06-13 ASSESSMENT — ACTIVITIES OF DAILY LIVING (ADL)
ADLS_ACUITY_SCORE: 41

## 2025-06-13 NOTE — PROGRESS NOTES
Reason for Visit: Cardioversion    0730 A/O. Pt has sleep apnea. No dentures or loose teeth. NPO x 12+ hrs. Pt's wife at bedside. Discharge instructions given to pt & wife w/ verbal understanding received. All questions & concerns addressed.         0720 EKG done - rhythm is Afib    CDV: Pt tolerated well. CDV x 3. See rhythm strips. See Procedural Sedation Flowsheet. Total sedation given by anesthesia see MAR.    0946 Pt discharged per w/c to private vehicle. All personal belongings sent with pt.

## 2025-06-13 NOTE — DISCHARGE INSTRUCTIONS
Cardioversion Discharge Instructions    After you go home:       For 24 hours - due to the sedation you received:    Have an adult stay with you for 24 hours.   Relax and take it easy.  Do NOT make any important or legal decisions.  Do NOT drive or operate machines at home or at work.  Do NOT drink alcohol.    Diet:    Start with clear liquids and progress to your normal diet as you feel able.    Medicines:    Take your medications, including blood thinners, unless your provider tells you not to.  If you have stopped any medications, check with your provider about when to restart them.    Follow Up Appointments:    Follow up with your cardiologist at [facility] of patient preference as instructed.  Follow up with your primary care provider as needed.    Post cardioversion:    The skin on your chest or back may feel tender for 48 hours.  If your skin is tender, you may:    Use a cold pack on the site. Never use ice directly on your skin. Use the cold pack for 20 minutes. Remove it for at least 30 minutes before re-using.  Apply 1% hydrocortisone cream to the skin (sold at drug stores)  Take Advil (Ibuprofen) or Tylenol (Acetaminophen) per your provider's recommendations.      Call your provider if you have:    Weakness, dizziness, lightheadedness, or fainting.  Shortness of breath.  Irregular heartbeat, feelings of your heart fluttering or beating fast, hard or palpitations.   More than minor skin discomfort or redness where the cardioversion pads were placed.  Questions or concerns.      Call 911 if you have:    Pain in your chest, arm, shoulder, neck, or upper back.  You have problems speaking or seeing.  Weakness in your arm or leg.  You are unable to move your arm or leg.  You have uncontrolled bleeding.         [facility] at [place]:    [phone] [facility] (7 days a week)

## 2025-06-13 NOTE — PRE-PROCEDURE
GENERAL PRE-PROCEDURE:   Procedure:  Cardioversion  Date/Time:  6/13/2025 8:33 AM    Verbal consent obtained?: Yes    Written consent obtained?: Yes    Risks and benefits: Risks, benefits and alternatives were discussed    Consent given by:  Patient  Patient states understanding of procedure being performed: Yes    Patient's understanding of procedure matches consent: Yes    Procedure consent matches procedure scheduled: Yes    Expected level of sedation:  Moderate  Appropriately NPO:  Yes  Mallampati  :  Grade 2- soft palate, base of uvula, tonsillar pillars, and portion of posterior pharyngeal wall visible  Lungs:  Lungs clear with good breath sounds bilaterally  Heart:  A-fib  History & Physical reviewed:  History and physical reviewed and no updates needed  Statement of review:  I have reviewed the lab findings, diagnostic data, medications, and the plan for sedation

## 2025-06-13 NOTE — PROGRESS NOTES
Care Suites Admission Nursing Note    Patient Information  Name: Quique Lyons  Age: 70 year old  Reason for admission: Cardioversion  Care Suites arrival time: 0630    Visitor Information  Name: Selina     Patient Admission/Assessment   Pre-procedure assessment complete: Yes  If abnormal assessment/labs, provider notified: N/A  NPO: Yes  Medications held per instructions/orders: Yes  Consent: deferred  If applicable, pregnancy test status: deferred  Patient oriented to room: Yes  Education/questions answered: Yes  Plan/other: Prep for cardioversion    Discharge Planning  Discharge name/phone number: Selina  Discharge location: home    Adan Black RN

## 2025-06-13 NOTE — PROCEDURES
DC Cardioversion Procedure Note:    Informed consent obtained.  Pads placed in AP position.  Anesthesia used, please see their documentation.    Synchronized, biphasic 200J shock x 3 delivered and on third attempt was successful in converting atrial fibrillation to normal sinus rhythm at 55 bpm.    Post DCCV rhythm: NSR at 55 bpm     No apparent complications.    Herman Osuna MD, [facility]  Cardiology

## 2025-06-16 ENCOUNTER — MYC MEDICAL ADVICE (OUTPATIENT)
Dept: CARDIOLOGY | Facility: CLINIC | Age: 70
End: 2025-06-16
Payer: COMMERCIAL

## 2025-06-16 DIAGNOSIS — I48.0 PAROXYSMAL ATRIAL FIBRILLATION (H): ICD-10-CM

## 2025-06-16 NOTE — TELEPHONE ENCOUNTER
"Pt calls in to report that he is pretty sure he is back in AFib again. Sent a MyChart with BP/HR readings and it appears it started this morning. Is compliant with Eliquis and diltiazem 240 mg daily. NOT taking metoprolol (unsure why only 2 tabs were sent). Follow up with Katey Hill NP for post-DCCV visit on 7/21/25. Has a \"smidge\" of chest discomfort when he really thinks about it, but only other symptom is HA--coffee drinker but did have a couple cups today. Will discuss with Dr Calvo for recommendations. Lexis Singletary RN Cardiology June 16, 2025, 3:53 PM    "

## 2025-06-16 NOTE — TELEPHONE ENCOUNTER
He should take his Toprol 25 mg once daily, continue other medications.  Continue to monitor at home.  So long as symptoms are minimal, okay to follow-up as planned.  He should call back sooner if he has any concerning symptoms from A-fib.    Ender

## 2025-06-17 ENCOUNTER — MYC REFILL (OUTPATIENT)
Dept: CARDIOLOGY | Facility: CLINIC | Age: 70
End: 2025-06-17
Payer: COMMERCIAL

## 2025-06-17 DIAGNOSIS — I48.0 PAROXYSMAL ATRIAL FIBRILLATION (H): ICD-10-CM

## 2025-06-17 DIAGNOSIS — I10 BENIGN ESSENTIAL HYPERTENSION: ICD-10-CM

## 2025-06-17 DIAGNOSIS — E78.5 HYPERLIPIDEMIA LDL GOAL <100: ICD-10-CM

## 2025-06-17 RX ORDER — METOPROLOL SUCCINATE 25 MG/1
25 TABLET, EXTENDED RELEASE ORAL DAILY
Qty: 2 TABLET | Refills: 0 | Status: CANCELLED | OUTPATIENT
Start: 2025-06-17

## 2025-06-17 RX ORDER — LOSARTAN POTASSIUM 100 MG/1
100 TABLET ORAL DAILY
Qty: 90 TABLET | Refills: 3 | Status: CANCELLED | OUTPATIENT
Start: 2025-06-17

## 2025-06-18 RX ORDER — METOPROLOL SUCCINATE 25 MG/1
25 TABLET, EXTENDED RELEASE ORAL DAILY
Qty: 90 TABLET | Refills: 3 | Status: SHIPPED | OUTPATIENT
Start: 2025-06-18

## 2025-06-18 RX ORDER — DILTIAZEM HYDROCHLORIDE 240 MG/1
240 CAPSULE, EXTENDED RELEASE ORAL DAILY
Qty: 90 CAPSULE | Refills: 3 | Status: SHIPPED | OUTPATIENT
Start: 2025-06-18

## 2025-06-18 RX ORDER — ROSUVASTATIN CALCIUM 10 MG/1
10 TABLET, COATED ORAL DAILY
Qty: 90 TABLET | Refills: 3 | OUTPATIENT
Start: 2025-06-18

## 2025-06-18 NOTE — TELEPHONE ENCOUNTER
[facility] Cardiology Refill Guideline reviewed.  Medication meets criteria for refill. Lexis Singletary RN Cardiology June 18, 2025, 7:51 AM

## 2025-07-02 ENCOUNTER — RESULTS FOLLOW-UP (OUTPATIENT)
Dept: CARDIOLOGY | Facility: CLINIC | Age: 70
End: 2025-07-02

## 2025-07-02 ENCOUNTER — HOSPITAL ENCOUNTER (OUTPATIENT)
Dept: CARDIOLOGY | Facility: CLINIC | Age: 70
Discharge: HOME | End: 2025-07-02
Attending: INTERNAL MEDICINE
Payer: COMMERCIAL

## 2025-07-02 ENCOUNTER — MYC MEDICAL ADVICE (OUTPATIENT)
Dept: CARDIOLOGY | Facility: CLINIC | Age: 70
End: 2025-07-02
Payer: COMMERCIAL

## 2025-07-02 DIAGNOSIS — I10 BENIGN ESSENTIAL HYPERTENSION: ICD-10-CM

## 2025-07-02 DIAGNOSIS — I48.0 PAROXYSMAL ATRIAL FIBRILLATION (H): Primary | ICD-10-CM

## 2025-07-02 DIAGNOSIS — I48.0 PAROXYSMAL ATRIAL FIBRILLATION (H): ICD-10-CM

## 2025-07-02 PROCEDURE — 93005 ELECTROCARDIOGRAM TRACING: CPT

## 2025-07-02 RX ORDER — LOSARTAN POTASSIUM 50 MG/1
50 TABLET ORAL DAILY
Qty: 90 TABLET | Refills: 1 | Status: SHIPPED | OUTPATIENT
Start: 2025-07-02

## 2025-07-02 RX ORDER — METOPROLOL SUCCINATE 50 MG/1
50 TABLET, EXTENDED RELEASE ORAL DAILY
Qty: 180 TABLET | Refills: 1 | Status: SHIPPED | OUTPATIENT
Start: 2025-07-02

## 2025-07-02 NOTE — TELEPHONE ENCOUNTER
Pt notified of recommendations via [employer]. Scripts to pharmacy. EKG order placed. Lexis Singletary RN Cardiology July 2, 2025, 10:32 AM

## 2025-07-02 NOTE — TELEPHONE ENCOUNTER
Blood pressures are good, but he is probably back in A-fib with heart rates in the 90s and monitor is detecting an irregular beat.  I would like him to increase metoprolol to 50 mg daily, decrease losartan to 50 mg daily, keep diltiazem the same.  I like him to come in for an EKG to confirm he is in A-fib.  Will go from there.  Will probably need antiarrhythmic medication with repeat cardioversion.    Ender

## 2025-07-02 NOTE — TELEPHONE ENCOUNTER
Has post-DCCV visit with Katey Hill NP on 7/21/25. Lexis Singletary RN Cardiology July 2, 2025, 7:38 AM

## 2025-07-21 ENCOUNTER — OFFICE VISIT (OUTPATIENT)
Dept: CARDIOLOGY | Facility: CLINIC | Age: 70
End: 2025-07-21
Attending: NURSE PRACTITIONER
Payer: COMMERCIAL

## 2025-07-21 VITALS
SYSTOLIC BLOOD PRESSURE: 125 MMHG | HEIGHT: 68 IN | HEART RATE: 95 BPM | DIASTOLIC BLOOD PRESSURE: 79 MMHG | WEIGHT: 209.3 LBS | RESPIRATION RATE: 16 BRPM | OXYGEN SATURATION: 97 % | BODY MASS INDEX: 31.72 KG/M2

## 2025-07-21 DIAGNOSIS — I48.91 ATRIAL FIBRILLATION STATUS POST CARDIOVERSION (H): Primary | ICD-10-CM

## 2025-07-21 DIAGNOSIS — I48.19 PERSISTENT ATRIAL FIBRILLATION (H): ICD-10-CM

## 2025-07-21 DIAGNOSIS — E78.5 HYPERLIPIDEMIA LDL GOAL <100: ICD-10-CM

## 2025-07-21 DIAGNOSIS — I10 BENIGN ESSENTIAL HYPERTENSION: ICD-10-CM

## 2025-07-21 PROCEDURE — 3074F SYST BP LT 130 MM HG: CPT | Performed by: NURSE PRACTITIONER

## 2025-07-21 PROCEDURE — 99214 OFFICE O/P EST MOD 30 MIN: CPT | Performed by: NURSE PRACTITIONER

## 2025-07-21 PROCEDURE — 3078F DIAST BP <80 MM HG: CPT | Performed by: NURSE PRACTITIONER

## 2025-07-21 PROCEDURE — 93000 ELECTROCARDIOGRAM COMPLETE: CPT | Performed by: NURSE PRACTITIONER

## 2025-07-21 RX ORDER — METOPROLOL SUCCINATE 50 MG/1
50 TABLET, EXTENDED RELEASE ORAL 2 TIMES DAILY
Qty: 120 TABLET | Refills: 3 | Status: SHIPPED | OUTPATIENT
Start: 2025-07-21

## 2025-07-21 NOTE — PROGRESS NOTES
~Cardiology Clinic Visit~    Primary Cardiologist: Dr. Calvo  Reason for visit: [facility] follow up    History of Present Illness    History of Present Illness-Quique Lyons, 70-year-old male, has a history of paroxysmal atrial fibrillation and atrial flutter, hypertension, hyperlipidemia, obesity, obstructive sleep apnea, COPD, tobacco abuse, and possible PFO seen on echocardiogram in 2020.     He was hospitalized in September 2020 for typical atrial flutter with rapid rates, symptomatic with dizziness and presyncope, converted to sinus rhythm after IV diltiazem. Underwent atrial flutter ablation in October 2020. Zio patch in 2021 showed less than 1% atrial fibrillation burden. Not on anticoagulation due to low AFib burden at that time.     He uses CPAP for sleep apnea. Possible alcohol trigger for arrhythmia events.     In May 2025, amlodipine was discontinued and diltiazem 240 mg daily started for arrhythmia and hypertension; Eliquis started for thrombotic prophylaxis. Underwent outpatient cardioversion on June 13, 2025, with successful conversion to sinus rhythm after three shocks.     On June 16, 2025, he reported persistent lightheadedness after bending over and standing up.  He had some improvement in fatigue and shortness of breath since cardioversion, but not fully resolved. Noted irregular heartbeat on all home blood pressure readings since cardioversion. Metoprolol XL prescription refilled and dose increased to 50 mg daily; losartan decreased to 50 mg daily. Clinic EKG on July 2, 2025, showed atrial fibrillation at a rate of 87.    Diagnostics:  Clinic EKG today showing coarse AF, rates 90s.  __________________________________________________________________         Assessment and Impression:     Persistent atrial fibrillation   S/p [facility] 07/2025 with brisk return to AF  Hx typical Aflutter with flutter ablation in 2020  Noted on clinic EKG today, rates variable 80-100s.  Largely asymptomatic  to arrhythmia.  On Eliquis 5 mg BID > no bleeding issues.  On Diltiazem ER, Toprol XL.  Recent echocardiogram reviewed, mild GALINDO noted, stable EF.  Hypertension  CRICKET on CPAP         Recommendations and Plan:     Continue diltiazem 240 mg daily for the management of both arrhythmia and hypertension.   Maintain Eliquis for thrombotic prophylaxis to reduce the risk of stroke associated with atrial fibrillation.  Increase the dosage of metoprolol XL to 50 mg twice daily.   Schedule appointment with EP team to discuss additional antiarrhythmic therapy vs consideration for ablation.  __________________________________________________________________    Thank you for the opportunity to participate in this pleasant patient's care.    We would be happy to see this patient sooner for any concerns in the meantime.    CRISTO Grover, CNP, CCK   Nurse Practitioner  [facility]    Today's clinic visit entailed:  The following tests were independently interpreted by me as noted in my documentation: EKG, labs, echo  Ordering of each unique test  Prescription drug management  The level of medical decision making during this visit was of moderate complexity.  Review of the result(s) of each unique test - cardiac testing, cardiac imaging, labs  Care everywhere reviewed for additional records to facilitate comprehensive patient care.  Recent hospitalization records and notes reviewed to facilitate comprehensive care coordination.    Orders this Visit:  Orders Placed This Encounter   Procedures    Follow-Up with Cardiology    EKG 12-lead complete w/read - Clinics (performed today)     Orders Placed This Encounter   Medications    metoprolol succinate ER (TOPROL XL) 50 MG 24 hr tablet     Sig: Take 1 tablet (50 mg) by mouth 2 times daily.     Dispense:  120 tablet     Refill:  3     Medications Discontinued During This Encounter   Medication Reason    metoprolol succinate ER (TOPROL XL) 50 MG 24 hr tablet   "    Encounter Diagnoses   Name Primary?    Benign essential hypertension     Persistent atrial fibrillation (H)     Hyperlipidemia LDL goal <100     Atrial fibrillation status post cardioversion (H) Yes     CURRENT MEDICATIONS:  Current Outpatient Medications   Medication Sig Dispense Refill    metoprolol succinate ER (TOPROL XL) 50 MG 24 hr tablet Take 1 tablet (50 mg) by mouth 2 times daily. 120 tablet 3    albuterol (PROAIR HFA/PROVENTIL HFA/VENTOLIN HFA) 108 (90 Base) MCG/ACT inhaler Inhale 2 puffs into the lungs every 4 hours as needed for shortness of breath or wheezing 18 g 0    apixaban ANTICOAGULANT (ELIQUIS ANTICOAGULANT) 5 MG tablet Take 1 tablet (5 mg) by mouth 2 times daily. 180 tablet 3    diltiazem ER (DILT-XR) 240 MG 24 hr ER beaded capsule Take 1 capsule (240 mg) by mouth daily. 90 capsule 3    losartan (COZAAR) 50 MG tablet Take 1 tablet (50 mg) by mouth daily. 90 tablet 1    rosuvastatin (CRESTOR) 10 MG tablet Take 1 tablet (10 mg) by mouth daily. 90 tablet 3     ALLERGIES   No Known Allergies  PAST MEDICAL, SURGICAL, FAMILY, SOCIAL HISTORY:  History was reviewed and updated as needed, see medical record.    Review of Systems:  A 10-point Review Of Systems is otherwise normal except for that which is noted in the HPI and interval summary.    Physical Exam:    Vitals: /79   Pulse 95   Resp 16   Ht 1.727 m (5' 8\")   Wt 94.9 kg (209 lb 4.8 oz)   SpO2 97%   BMI 31.82 kg/m    Constitutional: Appears stated age, well nourished, NAD.  Respiratory: Non-labored. Lungs clear  Cardiovascular: IRR, normal S1 and S2. No murmur.  No edema.  GI: Soft, non-distended, non-tender.  Skin: Warm and dry.   Musculoskeletal/Extremities: Symmetrical movement.  Neurologic: No gross focal deficits. Alert, awake.  Psychiatric: Affect appropriate. Mentation normal.    Recent Lab Results:  LIPID RESULTS:  Lab Results   Component Value Date    CHOL 144 04/03/2025    HDL 46 04/03/2025    LDL 88 04/03/2025    TRIG 49 " 2025     LIVER ENZYME RESULTS:  Lab Results   Component Value Date    AST 17 2020    ALT 24 2022    ALT 23 2020     CBC RESULTS:  Lab Results   Component Value Date    WBC 8.2 2025    WBC 9.0 10/16/2020    RBC 5.32 2025    RBC 5.13 10/16/2020    HGB 16.7 2025    HGB 16.7 10/16/2020    HCT 49.8 2025    HCT 48.2 10/16/2020    MCV 94 2025    MCV 94 10/16/2020    MCH 31.4 2025    MCH 32.6 10/16/2020    MCHC 33.5 2025    MCHC 34.6 10/16/2020    RDW 12.3 2025    RDW 12.4 10/16/2020     2025     10/16/2020     BMP RESULTS:  Lab Results   Component Value Date     2025     10/16/2020    POTASSIUM 4.9 2025    POTASSIUM 4.1 04/15/2022    POTASSIUM 4.3 10/16/2020    CHLORIDE 103 2025    CHLORIDE 111 (H) 04/15/2022    CHLORIDE 105 10/16/2020    CO2 26 2025    CO2 30 04/15/2022    CO2 30 10/16/2020    ANIONGAP 11 2025    ANIONGAP 2 (L) 04/15/2022    ANIONGAP 3 10/16/2020     (H) 2025    GLC 88 04/15/2022    GLC 90 10/16/2020    BUN 20.1 2025    BUN 14 04/15/2022    BUN 16 10/16/2020    CR 1.11 2025    CR 0.96 10/16/2020    GFRESTIMATED 73 2025    GFRESTIMATED 84 10/16/2020    GFRESTBLACK >90 10/16/2020    WESLEY 9.4 2025    WESLEY 8.9 10/16/2020      A1C RESULTS:  Lab Results   Component Value Date    A1C 5.8 (H) 2025     INR RESULTS:  Lab Results   Component Value Date    INR 1.09 2020       Recent Results (from the past 4320 hours)   Echocardiogram Complete   Result Value    LVEF  55-60%    Narrative    [id number]  [id number]  [id number]  [id number]^RAFI^RUFINO^RUDY     [facility]  Echocardiography Laboratory  [address] [place] [address]     Name: BEBO CHÁVEZ  MRN: [id number]  : 1955  Study Date: 2025 02:50 PM  Age: 69 yrs  Gender: Male  Patient Location: [facility]  Reason For Study: Benign essential  hypertension, Paroxysmal atrial  fibrillation (H  Ordering Physician: RUFINO MCKEE  Referring Physician: Steven Adam  Performed By: Anabell Barahona RDCS     BSA: 2.1 m2  Height: 68 in  Weight: 211 lb  BP: 108/76 mmHg  ______________________________________________________________________________  Procedure  Echocardiogram with two-dimensional, color and spectral Doppler.  ______________________________________________________________________________  Interpretation Summary     Left ventricular systolic function is normal.  The visual ejection fraction is 55-60%.  No regional wall motion abnormalities noted.  The right ventricular systolic function is borderline reduced.  Prior echo from 2020 revealed low normal ejection fraction and normal right  ventricle systolic function The study was technically difficult.  ______________________________________________________________________________  Left Ventricle  The left ventricle is normal in size. There is mild concentric left  ventricular hypertrophy. Left ventricular systolic function is normal. The  visual ejection fraction is 55-60%. Left ventricular diastolic function is  indeterminate. No regional wall motion abnormalities noted.     Right Ventricle  The right ventricle is normal size. The right ventricular systolic function is  borderline reduced.     Atria  The left atrium is mildly dilated. The right atrium is mildly dilated.     Mitral Valve  The mitral valve leaflets are mildly thickened.     Tricuspid Valve  There is trace to mild tricuspid regurgitation. The right ventricular systolic  pressure is approximated at 19.1 mmHg plus the right atrial pressure.     Aortic Valve  There is moderate trileaflet aortic sclerosis. No aortic stenosis is present.     Vessels  The aortic root is normal size.     Pericardium  There is no pericardial effusion.     Rhythm  The rhythm was rapid atrial fibrillation.      ______________________________________________________________________________  MMode/2D Measurements & Calculations  IVSd: 1.1 cm  LVIDd: 4.7 cm  LVIDs: 2.8 cm  LVPWd: 1.2 cm  FS: 40.8 %     LV mass(C)d: 208.3 grams  LV mass(C)dI: 99.6 grams/m2  Ao root diam: 3.4 cm  LA dimension: 3.6 cm  asc Aorta Diam: 3.7 cm  LA/Ao: 1.1  Ao root diam index Ht(cm/m): 2.0  Ao root diam index BSA (cm/m2): 1.6  Asc Ao diam index BSA (cm/m2): 1.8  Asc Ao diam index Ht(cm/m): 2.2  LA Volume (BP): 75.2 ml  LA Volume Index (BP): 36.0 ml/m2     RV Base: 4.2 cm  RWT: 0.53     Doppler Measurements & Calculations  MV E max alta: 103.0 cm/sec  MV dec slope: 688.0 cm/sec2  MV dec time: 0.15 sec  TR max alta: 218.0 cm/sec  TR max P.1 mmHg  E/E' av.2  Lateral E/e': 7.1  Medial E/e': 9.2     ______________________________________________________________________________  Report approved by: Alfredo Martin MD on 2025 03:56 PM

## 2025-07-21 NOTE — LETTER
7/21/2025    Steven Adam PA-C  [address] [facility] [address]    RE: Quique Lyons       Dear Colleague,     I had the pleasure of seeing Quique Lyons in the [facility].              ~Cardiology Clinic Visit~    Primary Cardiologist: Dr. Calvo  Reason for visit: [facility] follow up    History of Present Illness    History of Present Illness-Quique Lyons, 70-year-old male, has a history of paroxysmal atrial fibrillation and atrial flutter, hypertension, hyperlipidemia, obesity, obstructive sleep apnea, COPD, tobacco abuse, and possible PFO seen on echocardiogram in 2020.     He was hospitalized in September 2020 for typical atrial flutter with rapid rates, symptomatic with dizziness and presyncope, converted to sinus rhythm after IV diltiazem. Underwent atrial flutter ablation in October 2020. Zio patch in 2021 showed less than 1% atrial fibrillation burden. Not on anticoagulation due to low AFib burden at that time.     He uses CPAP for sleep apnea. Possible alcohol trigger for arrhythmia events.     In May 2025, amlodipine was discontinued and diltiazem 240 mg daily started for arrhythmia and hypertension; Eliquis started for thrombotic prophylaxis. Underwent outpatient cardioversion on June 13, 2025, with successful conversion to sinus rhythm after three shocks.     On June 16, 2025, he reported persistent lightheadedness after bending over and standing up.  He had some improvement in fatigue and shortness of breath since cardioversion, but not fully resolved. Noted irregular heartbeat on all home blood pressure readings since cardioversion. Metoprolol XL prescription refilled and dose increased to 50 mg daily; losartan decreased to 50 mg daily. Clinic EKG on July 2, 2025, showed atrial fibrillation at a rate of 87.    Diagnostics:  Clinic EKG today showing coarse AF, rates 90s.  __________________________________________________________________         Assessment and  Impression:     Persistent atrial fibrillation   S/p DCCV 07/2025 with brisk return to AF  Hx typical Aflutter with flutter ablation in 2020  Noted on clinic EKG today, rates variable 80-100s.  Largely asymptomatic to arrhythmia.  On Eliquis 5 mg BID > no bleeding issues.  On Diltiazem ER, Toprol XL.  Recent echocardiogram reviewed, mild GALINDO noted, stable EF.  Hypertension  CRICKET on CPAP         Recommendations and Plan:     Continue diltiazem 240 mg daily for the management of both arrhythmia and hypertension.   Maintain Eliquis for thrombotic prophylaxis to reduce the risk of stroke associated with atrial fibrillation.  Increase the dosage of metoprolol XL to 50 mg twice daily.   Schedule appointment with EP team to discuss additional antiarrhythmic therapy vs consideration for ablation.  __________________________________________________________________    Thank you for the opportunity to participate in this pleasant patient's care.    We would be happy to see this patient sooner for any concerns in the meantime.    CRISTO Grover, CNP, [facility]   Nurse Practitioner  [facility]    Today's clinic visit entailed:  The following tests were independently interpreted by me as noted in my documentation: EKG, labs, echo  Ordering of each unique test  Prescription drug management  The level of medical decision making during this visit was of moderate complexity.  Review of the result(s) of each unique test - cardiac testing, cardiac imaging, labs  Care everywhere reviewed for additional records to facilitate comprehensive patient care.  Recent hospitalization records and notes reviewed to facilitate comprehensive care coordination.    Orders this Visit:  Orders Placed This Encounter   Procedures     Follow-Up with Cardiology     EKG 12-lead complete w/read - Clinics (performed today)     Orders Placed This Encounter   Medications     metoprolol succinate ER (TOPROL XL) 50 MG 24 hr tablet     Sig:  "Take 1 tablet (50 mg) by mouth 2 times daily.     Dispense:  120 tablet     Refill:  3     Medications Discontinued During This Encounter   Medication Reason     metoprolol succinate ER (TOPROL XL) 50 MG 24 hr tablet      Encounter Diagnoses   Name Primary?     Benign essential hypertension      Persistent atrial fibrillation (H)      Hyperlipidemia LDL goal <100      Atrial fibrillation status post cardioversion (H) Yes     CURRENT MEDICATIONS:  Current Outpatient Medications   Medication Sig Dispense Refill     metoprolol succinate ER (TOPROL XL) 50 MG 24 hr tablet Take 1 tablet (50 mg) by mouth 2 times daily. 120 tablet 3     albuterol (PROAIR HFA/PROVENTIL HFA/VENTOLIN HFA) 108 (90 Base) MCG/ACT inhaler Inhale 2 puffs into the lungs every 4 hours as needed for shortness of breath or wheezing 18 g 0     apixaban ANTICOAGULANT (ELIQUIS ANTICOAGULANT) 5 MG tablet Take 1 tablet (5 mg) by mouth 2 times daily. 180 tablet 3     diltiazem ER (DILT-XR) 240 MG 24 hr ER beaded capsule Take 1 capsule (240 mg) by mouth daily. 90 capsule 3     losartan (COZAAR) 50 MG tablet Take 1 tablet (50 mg) by mouth daily. 90 tablet 1     rosuvastatin (CRESTOR) 10 MG tablet Take 1 tablet (10 mg) by mouth daily. 90 tablet 3     ALLERGIES   No Known Allergies  PAST MEDICAL, SURGICAL, FAMILY, SOCIAL HISTORY:  History was reviewed and updated as needed, see medical record.    Review of Systems:  A 10-point Review Of Systems is otherwise normal except for that which is noted in the HPI and interval summary.    Physical Exam:    Vitals: /79   Pulse 95   Resp 16   Ht 1.727 m (5' 8\")   Wt 94.9 kg (209 lb 4.8 oz)   SpO2 97%   BMI 31.82 kg/m    Constitutional: Appears stated age, well nourished, NAD.  Respiratory: Non-labored. Lungs clear  Cardiovascular: IRR, normal S1 and S2. No murmur.  No edema.  GI: Soft, non-distended, non-tender.  Skin: Warm and dry.   Musculoskeletal/Extremities: Symmetrical movement.  Neurologic: No gross " focal deficits. Alert, awake.  Psychiatric: Affect appropriate. Mentation normal.    Recent Lab Results:  LIPID RESULTS:  Lab Results   Component Value Date    CHOL 144 04/03/2025    HDL 46 04/03/2025    LDL 88 04/03/2025    TRIG 49 04/03/2025     LIVER ENZYME RESULTS:  Lab Results   Component Value Date    AST 17 09/11/2020    ALT 24 05/26/2022    ALT 23 09/11/2020     CBC RESULTS:  Lab Results   Component Value Date    WBC 8.2 04/03/2025    WBC 9.0 10/16/2020    RBC 5.32 04/03/2025    RBC 5.13 10/16/2020    HGB 16.7 04/03/2025    HGB 16.7 10/16/2020    HCT 49.8 04/03/2025    HCT 48.2 10/16/2020    MCV 94 04/03/2025    MCV 94 10/16/2020    MCH 31.4 04/03/2025    MCH 32.6 10/16/2020    MCHC 33.5 04/03/2025    MCHC 34.6 10/16/2020    RDW 12.3 04/03/2025    RDW 12.4 10/16/2020     04/03/2025     10/16/2020     BMP RESULTS:  Lab Results   Component Value Date     04/03/2025     10/16/2020    POTASSIUM 4.9 06/13/2025    POTASSIUM 4.1 04/15/2022    POTASSIUM 4.3 10/16/2020    CHLORIDE 103 04/03/2025    CHLORIDE 111 (H) 04/15/2022    CHLORIDE 105 10/16/2020    CO2 26 04/03/2025    CO2 30 04/15/2022    CO2 30 10/16/2020    ANIONGAP 11 04/03/2025    ANIONGAP 2 (L) 04/15/2022    ANIONGAP 3 10/16/2020     (H) 04/03/2025    GLC 88 04/15/2022    GLC 90 10/16/2020    BUN 20.1 04/03/2025    BUN 14 04/15/2022    BUN 16 10/16/2020    CR 1.11 04/03/2025    CR 0.96 10/16/2020    GFRESTIMATED 73 04/03/2025    GFRESTIMATED 84 10/16/2020    GFRESTBLACK >90 10/16/2020    WESLEY 9.4 04/03/2025    WESLEY 8.9 10/16/2020      A1C RESULTS:  Lab Results   Component Value Date    A1C 5.8 (H) 04/03/2025     INR RESULTS:  Lab Results   Component Value Date    INR 1.09 09/11/2020       Recent Results (from the past 4320 hours)   Echocardiogram Complete   Result Value    LVEF  55-60%    Narrative    [id number]  [id number]  [id number]  [id number]^RAFI^RUFINO^[facility]  Echocardiography  Laboratory  [address]  [place] [address]     Name: BEBO CHÁVEZ  MRN: [id number]  : 1955  Study Date: 2025 02:50 PM  Age: 69 yrs  Gender: Male  Patient Location: [facility]  Reason For Study: Benign essential hypertension, Paroxysmal atrial  fibrillation (H  Ordering Physician: RUFINO MCKEE  Referring Physician: Steven Adam  Performed By: Anabell Barahona RDCS     BSA: 2.1 m2  Height: 68 in  Weight: 211 lb  BP: 108/76 mmHg  ______________________________________________________________________________  Procedure  Echocardiogram with two-dimensional, color and spectral Doppler.  ______________________________________________________________________________  Interpretation Summary     Left ventricular systolic function is normal.  The visual ejection fraction is 55-60%.  No regional wall motion abnormalities noted.  The right ventricular systolic function is borderline reduced.  Prior echo from  revealed low normal ejection fraction and normal right  ventricle systolic function The study was technically difficult.  ______________________________________________________________________________  Left Ventricle  The left ventricle is normal in size. There is mild concentric left  ventricular hypertrophy. Left ventricular systolic function is normal. The  visual ejection fraction is 55-60%. Left ventricular diastolic function is  indeterminate. No regional wall motion abnormalities noted.     Right Ventricle  The right ventricle is normal size. The right ventricular systolic function is  borderline reduced.     Atria  The left atrium is mildly dilated. The right atrium is mildly dilated.     Mitral Valve  The mitral valve leaflets are mildly thickened.     Tricuspid Valve  There is trace to mild tricuspid regurgitation. The right ventricular systolic  pressure is approximated at 19.1 mmHg plus the right atrial pressure.     Aortic Valve  There is moderate trileaflet aortic sclerosis.  No aortic stenosis is present.     Vessels  The aortic root is normal size.     Pericardium  There is no pericardial effusion.     Rhythm  The rhythm was rapid atrial fibrillation.     ______________________________________________________________________________  MMode/2D Measurements & Calculations  IVSd: 1.1 cm  LVIDd: 4.7 cm  LVIDs: 2.8 cm  LVPWd: 1.2 cm  FS: 40.8 %     LV mass(C)d: 208.3 grams  LV mass(C)dI: 99.6 grams/m2  Ao root diam: 3.4 cm  LA dimension: 3.6 cm  asc Aorta Diam: 3.7 cm  LA/Ao: 1.1  Ao root diam index Ht(cm/m): 2.0  Ao root diam index BSA (cm/m2): 1.6  Asc Ao diam index BSA (cm/m2): 1.8  Asc Ao diam index Ht(cm/m): 2.2  LA Volume (BP): 75.2 ml  LA Volume Index (BP): 36.0 ml/m2     RV Base: 4.2 cm  RWT: 0.53     Doppler Measurements & Calculations  MV E max alta: 103.0 cm/sec  MV dec slope: 688.0 cm/sec2  MV dec time: 0.15 sec  TR max alta: 218.0 cm/sec  TR max P.1 mmHg  E/E' av.2  Lateral E/e': 7.1  Medial E/e': 9.2     ______________________________________________________________________________  Report approved by: Alfredo Martin MD on 2025 03:56 PM                              Thank you for allowing me to participate in the care of your patient.      Sincerely,     CRISTO Grover CNP     [facility]  cc:   CRISTO Lopez CNP  [address] [place] [address]

## 2025-07-21 NOTE — PATIENT INSTRUCTIONS
Thank you for your visit with the [facility].    Today's Summary:    Increase Metoprolol XL to 50 mg twice daily.  Continue Diltiazem 240 mg daily.  If your systolic blood pressure (top number) is consistently less than 100, please call the clinic.    Follow-up:  Cardiology follow up at [facility]: Electrophysiology consult.     Cardiology Scheduling~[phone]  Cardiology Clinic RN~[phone] (Lexis RN, Amanda RN; Olga RN)    It was a pleasure seeing you today.     CRISTO Grover, CNP  Certified Nurse Practitioner  [facility]  July 21, 2025  ________________________________________________________

## (undated) DEVICE — INTRODUCER SHEATH GREEN 6.5FRX11CM .038IN PSI-6F-11-038ACT

## (undated) DEVICE — PATCH CARTO 3 EXTERNAL REFERENCE 3D MAPPING CREFP6

## (undated) DEVICE — CATH BLAZER DX-20 DUO-DECAPOLA

## (undated) DEVICE — INTRO SHEALTH 8.5FRX63CM SRO 406853

## (undated) DEVICE — INTRO SHEATH 7FRX10CM PINNACLE RSS702

## (undated) DEVICE — CATH THERMOCOOL SMARTTOUCH SF FJ CURVE

## (undated) DEVICE — MEDITRACE MULTIFUNTION ADULT RADIOTRANSPARENT ELECTRODE FOR ZOLL

## (undated) DEVICE — TUBE SET SMARKABLATE IRRIGATION

## (undated) DEVICE — PACK EP SRG PROC LF DISP SAN32EPFSR

## (undated) DEVICE — RAD INTRODUCER KIT MICRO 5FRX10CM .018 NITINOL G/W

## (undated) DEVICE — CATH EP 6FR 2MM TIP 2-8-2 115C

## (undated) RX ORDER — KETOROLAC TROMETHAMINE 30 MG/ML
INJECTION, SOLUTION INTRAMUSCULAR; INTRAVENOUS
Status: DISPENSED
Start: 2020-10-16

## (undated) RX ORDER — HEPARIN SODIUM 1000 [USP'U]/ML
INJECTION, SOLUTION INTRAVENOUS; SUBCUTANEOUS
Status: DISPENSED
Start: 2020-10-16

## (undated) RX ORDER — FENTANYL CITRATE 50 UG/ML
INJECTION, SOLUTION INTRAMUSCULAR; INTRAVENOUS
Status: DISPENSED
Start: 2020-10-16

## (undated) RX ORDER — HEPARIN SODIUM 200 [USP'U]/100ML
INJECTION, SOLUTION INTRAVENOUS
Status: DISPENSED
Start: 2020-10-16

## (undated) RX ORDER — LIDOCAINE HYDROCHLORIDE 10 MG/ML
INJECTION, SOLUTION EPIDURAL; INFILTRATION; INTRACAUDAL; PERINEURAL
Status: DISPENSED
Start: 2020-10-16